# Patient Record
Sex: FEMALE | Race: WHITE | NOT HISPANIC OR LATINO | Employment: UNEMPLOYED | URBAN - METROPOLITAN AREA
[De-identification: names, ages, dates, MRNs, and addresses within clinical notes are randomized per-mention and may not be internally consistent; named-entity substitution may affect disease eponyms.]

---

## 2017-06-13 ENCOUNTER — ALLSCRIPTS OFFICE VISIT (OUTPATIENT)
Dept: OTHER | Facility: OTHER | Age: 57
End: 2017-06-13

## 2017-08-04 ENCOUNTER — ALLSCRIPTS OFFICE VISIT (OUTPATIENT)
Dept: OTHER | Facility: OTHER | Age: 57
End: 2017-08-04

## 2017-08-04 DIAGNOSIS — Z12.31 ENCOUNTER FOR SCREENING MAMMOGRAM FOR MALIGNANT NEOPLASM OF BREAST: ICD-10-CM

## 2017-08-04 DIAGNOSIS — R10.2 PELVIC AND PERINEAL PAIN: ICD-10-CM

## 2017-08-24 LAB
A/G RATIO (HISTORICAL): 1.7 (ref 1.2–2.2)
ALBUMIN SERPL BCP-MCNC: 4.5 G/DL (ref 3.5–5.5)
ALP SERPL-CCNC: 58 IU/L (ref 39–117)
ALT SERPL W P-5'-P-CCNC: 16 IU/L (ref 0–32)
AST SERPL W P-5'-P-CCNC: 20 IU/L (ref 0–40)
BASOPHILS # BLD AUTO: 0 %
BASOPHILS # BLD AUTO: 0 X10E3/UL (ref 0–0.2)
BILIRUB SERPL-MCNC: 0.2 MG/DL (ref 0–1.2)
BUN SERPL-MCNC: 14 MG/DL (ref 6–24)
BUN/CREA RATIO (HISTORICAL): 21 (ref 9–23)
CALCIUM SERPL-MCNC: 9.8 MG/DL (ref 8.7–10.2)
CHLORIDE SERPL-SCNC: 96 MMOL/L (ref 96–106)
CHOLEST SERPL-MCNC: 166 MG/DL (ref 100–199)
CO2 SERPL-SCNC: 24 MMOL/L (ref 18–29)
CREAT SERPL-MCNC: 0.68 MG/DL (ref 0.57–1)
DEPRECATED RDW RBC AUTO: 13.4 % (ref 12.3–15.4)
EGFR AFRICAN AMERICAN (HISTORICAL): 114 ML/MIN/1.73
EGFR-AMERICAN CALC (HISTORICAL): 99 ML/MIN/1.73
EOSINOPHIL # BLD AUTO: 0 %
EOSINOPHIL # BLD AUTO: 0 X10E3/UL (ref 0–0.4)
GLUCOSE SERPL-MCNC: 119 MG/DL (ref 65–99)
HBA1C MFR BLD HPLC: 6.4 % (ref 4.8–5.6)
HCT VFR BLD AUTO: 46 % (ref 34–46.6)
HDLC SERPL-MCNC: 46 MG/DL
HGB BLD-MCNC: 15.2 G/DL (ref 11.1–15.9)
IMM.GRANULOCYTES (CD4/8) (HISTORICAL): 0 %
IMM.GRANULOCYTES (CD4/8) (HISTORICAL): 0 X10E3/UL (ref 0–0.1)
LDLC SERPL CALC-MCNC: 67 MG/DL (ref 0–99)
LYMPHOCYTES # BLD AUTO: 2.8 X10E3/UL (ref 0.7–3.1)
LYMPHOCYTES # BLD AUTO: 42 %
MCH RBC QN AUTO: 30.6 PG (ref 26.6–33)
MCHC RBC AUTO-ENTMCNC: 33 G/DL (ref 31.5–35.7)
MCV RBC AUTO: 93 FL (ref 79–97)
MONOCYTES # BLD AUTO: 0.5 X10E3/UL (ref 0.1–0.9)
MONOCYTES (HISTORICAL): 7 %
NEUTROPHILS # BLD AUTO: 3.4 X10E3/UL (ref 1.4–7)
NEUTROPHILS # BLD AUTO: 51 %
PLATELET # BLD AUTO: 221 X10E3/UL (ref 150–379)
POTASSIUM SERPL-SCNC: 4.7 MMOL/L (ref 3.5–5.2)
RBC (HISTORICAL): 4.97 X10E6/UL (ref 3.77–5.28)
SODIUM SERPL-SCNC: 139 MMOL/L (ref 134–144)
TOT. GLOBULIN, SERUM (HISTORICAL): 2.7 G/DL (ref 1.5–4.5)
TOTAL PROTEIN (HISTORICAL): 7.2 G/DL (ref 6–8.5)
TRIGL SERPL-MCNC: 266 MG/DL (ref 0–149)
WBC # BLD AUTO: 6.7 X10E3/UL (ref 3.4–10.8)

## 2017-08-25 ENCOUNTER — GENERIC CONVERSION - ENCOUNTER (OUTPATIENT)
Dept: OTHER | Facility: OTHER | Age: 57
End: 2017-08-25

## 2018-01-10 NOTE — RESULT NOTES
Verified Results  (1) CBC/PLT/DIFF [id number] 12:17PM [facility]     Test Name Result Flag Reference   WBC 6 7 x10E3/uL  3 4-10 8   RBC 4 90 x10E6/uL  3 77-5 28   Hemoglobin 15 4 g/dL  11 1-15 9   Hematocrit 46 1 %  34 0-46  6   MCV 94 fL  79-97   MCH 31 4 pg  26 6-33 0   MCHC 33 4 g/dL  31 5-35 7   RDW 13 6 %  12 3-15 4   Platelets 730 C06K1/XI  150-379   Neutrophils 51 %     Lymphs 41 %     Monocytes 8 %     Eos 0 %     Basos 0 %     Neutrophils (Absolute) 3 4 x10E3/uL  1 4-7 0   Lymphs (Absolute) 2 8 x10E3/uL  0 7-3 1   Monocytes(Absolute) 0 5 x10E3/uL  0 1-0 9   Eos (Absolute) 0 0 x10E3/uL  0 0-0 4   Baso (Absolute) 0 0 x10E3/uL  0 0-0 2   Immature Granulocytes 0 %     Immature Grans (Abs) 0 0 x10E3/uL  0 0-0 1     (1) VITAMIN D 25-HYDROXY [id number] 12:17PM [facility]     Test Name Result Flag Reference   Vitamin D, 25-Hydroxy 41 2 ng/mL  30 0-100 0   Vitamin D deficiency has been defined by the [place] of  Medicine and an Endocrine Society practice guideline as a  level of serum 25-OH vitamin D less than 20 ng/mL (1,2)  The Endocrine Society went on to further define vitamin D  insufficiency as a level between 21 and 29 ng/mL (2)  1  IOM ([place] of Medicine)  2010  Dietary reference     intakes for calcium and D  [address] [facility]: The     [place]  2  Adebayo MF, Meron NC, Che ONEAL, et al      Evaluation, treatment, and prevention of vitamin D     deficiency: an Endocrine Society clinical practice     guideline  JCEM  2011 Jul; 96(7):1911-30  [facility]) LP+LDL/HDL Ratio [id number] 12:17PM [facility]     Test Name Result Flag Reference   Cholesterol, Total 120 mg/dL  100-199   Triglycerides 188 mg/dL H 0-149   HDL Cholesterol 36 mg/dL L >39   According to ATP-III Guidelines, HDL-C >59 mg/dL is considered a  negative risk factor for CHD     VLDL Cholesterol Steven 38 mg/dL  5-40   LDL Cholesterol Calc 46 mg/dL  0-99   LDL/HDL Ratio 1 3 ratio units  0 0-3 2   LDL/HDL Ratio Men  Women                                               1/2 Avg  Risk  1 0    1 5                                                   Avg Risk  3 6    3 2                                                2X Avg  Risk  6 2    5 0                                                3X Avg  Risk  8 0    6 1

## 2018-01-11 NOTE — RESULT NOTES
Message   Pap smear is normal with negative HPV so next can be repeated in 5 years  Return in 1 year for routine gyn exam       Verified Results  ([facility]) Pap IG, HPV-hr 21Jun2016 12:00AM Cassie Hernandez 62    [facility] Endocervical  No  of containers  Thiago Ariza 01 CYTYC Thin Prep Vial     Test Name Result Flag Reference   DIAGNOSIS: Comment     NEGATIVE FOR INTRAEPITHELIAL LESION AND MALIGNANCY  THE CYTOLOGY PROCESSING WAS PERFORMED AT THE [facility] LOCATED AT  [facility] [address], [address] [id number]  Specimen adequacy: Comment     Satisfactory for evaluation  Endocervical and/or squamous metaplastic  cells (endocervical component) are present  Clinician provided ICD10: Comment     [id number]   Performed by: Markie Arguelles, Cytotechnologist (ASCP)     Thiago Ariza Note: Comment     The Pap smear is a screening test designed to aid in the detection of  premalignant and malignant conditions of the uterine cervix  It is not a  diagnostic procedure and should not be used as the sole means of detecting  cervical cancer  Both false-positive and false-negative reports do occur  Test Methodology: Comment     This liquid based ThinPrep(R) pap test was screened with the  use of an image guided system  HPV, high-risk Negative  Negative   This high-risk HPV test detects thirteen high-risk types  (16/18/31/33/35/39/45/51/52/56/58/59/68) without differentiation  Signatures   Electronically signed by :  JAYSON Hanna; Jun 24 2016  9:00AM EST                       (Author)

## 2018-01-11 NOTE — MISCELLANEOUS
Provider Comments  Provider Comments:   Patient was a no show for today's OV  Signatures   Electronically signed by :  JAYSON Maher; Jun 13 2017 12:20PM EST                       (Author)

## 2018-01-11 NOTE — RESULT NOTES
Verified Results  (1) CBC/PLT/DIFF 24Aug2017 12:01PM [facility]     Test Name Result Flag Reference   WBC 6 7 x10E3/uL  3 4-10 8   RBC 4 97 x10E6/uL  3 77-5 28   Hemoglobin 15 2 g/dL  11 1-15 9   Hematocrit 46 0 %  34 0-46  6   MCV 93 fL  79-97   MCH 30 6 pg  26 6-33 0   MCHC 33 0 g/dL  31 5-35 7   RDW 13 4 %  12 3-15 4   Platelets 818 X30Y6/GI  150-379   Neutrophils 51 %     Lymphs 42 %     Monocytes 7 %     Eos 0 %     Basos 0 %     Neutrophils (Absolute) 3 4 x10E3/uL  1 4-7 0   Lymphs (Absolute) 2 8 x10E3/uL  0 7-3 1   Monocytes(Absolute) 0 5 x10E3/uL  0 1-0 9   Eos (Absolute) 0 0 x10E3/uL  0 0-0 4   Baso (Absolute) 0 0 x10E3/uL  0 0-0 2   Immature Granulocytes 0 %     Immature Grans (Abs) 0 0 x10E3/uL  0 0-0 1     (1) COMPREHENSIVE METABOLIC PANEL [id number] 70:18ZR [facility]     Test Name Result Flag Reference   Glucose, Serum 119 mg/dL H 65-99   BUN 14 mg/dL  6-24   Creatinine, Serum 0 68 mg/dL  0 57-1 00   BUN/Creatinine Ratio 21  9-23   Sodium, Serum 139 mmol/L  134-144   Potassium, Serum 4 7 mmol/L  3 5-5 2   Chloride, Serum 96 mmol/L     Carbon Dioxide, Total 24 mmol/L  18-29   Calcium, Serum 9 8 mg/dL  8 7-10 2   Protein, Total, Serum 7 2 g/dL  6 0-8 5   Albumin, Serum 4 5 g/dL  3 5-5 5   Globulin, Total 2 7 g/dL  1 5-4 5   A/G Ratio 1 7  1 2-2 2   Bilirubin, Total 0 2 mg/dL  0 0-1 2   Alkaline Phosphatase, S 58 IU/L     AST (SGOT) 20 IU/L  0-40   ALT (SGPT) 16 IU/L  0-32   eGFR If NonAfricn Am 99 mL/min/1 73  >59   eGFR If Africn Am 114 mL/min/1 73  >59     (1) HEMOGLOBIN A1C 24Aug2017 12:01PM [facility]     Test Name Result Flag Reference   Hemoglobin A1c 6 4 % H 4 8-5 6   Pre-diabetes: 5 7 - 6 4           Diabetes: >6 4           Glycemic control for adults with diabetes: <7 0     (1) LIPID PANEL FASTING W DIRECT LDL REFLEX 24Aug2017 12:01PM [facility]     Test Name Result Flag Reference   Cholesterol, Total 166 mg/dL  100-199   Triglycerides 266 mg/dL H 0-149   HDL Cholesterol 46 mg/dL  >39   LDL Cholesterol Calc 67 mg/dL  0-99

## 2018-01-14 VITALS
SYSTOLIC BLOOD PRESSURE: 120 MMHG | WEIGHT: 275 LBS | DIASTOLIC BLOOD PRESSURE: 70 MMHG | HEIGHT: 66 IN | TEMPERATURE: 97.7 F | BODY MASS INDEX: 44.2 KG/M2 | HEART RATE: 60 BPM

## 2018-02-06 ENCOUNTER — OFFICE VISIT (OUTPATIENT)
Dept: FAMILY MEDICINE CLINIC | Facility: HOSPITAL | Age: 58
End: 2018-02-06
Payer: COMMERCIAL

## 2018-02-06 VITALS
SYSTOLIC BLOOD PRESSURE: 108 MMHG | HEART RATE: 68 BPM | DIASTOLIC BLOOD PRESSURE: 80 MMHG | HEIGHT: 66 IN | BODY MASS INDEX: 44.97 KG/M2 | TEMPERATURE: 97.4 F | WEIGHT: 279.8 LBS

## 2018-02-06 DIAGNOSIS — M54.50 CHRONIC RIGHT-SIDED LOW BACK PAIN WITHOUT SCIATICA: ICD-10-CM

## 2018-02-06 DIAGNOSIS — Z12.31 ENCOUNTER FOR SCREENING MAMMOGRAM FOR MALIGNANT NEOPLASM OF BREAST: ICD-10-CM

## 2018-02-06 DIAGNOSIS — R73.01 IMPAIRED FASTING GLUCOSE: ICD-10-CM

## 2018-02-06 DIAGNOSIS — G89.29 CHRONIC PAIN OF LEFT HEEL: ICD-10-CM

## 2018-02-06 DIAGNOSIS — G89.29 CHRONIC RIGHT-SIDED LOW BACK PAIN WITHOUT SCIATICA: ICD-10-CM

## 2018-02-06 DIAGNOSIS — Z12.11 SCREEN FOR COLON CANCER: ICD-10-CM

## 2018-02-06 DIAGNOSIS — I10 ESSENTIAL HYPERTENSION: Primary | ICD-10-CM

## 2018-02-06 DIAGNOSIS — M79.672 CHRONIC PAIN OF LEFT HEEL: ICD-10-CM

## 2018-02-06 DIAGNOSIS — F33.42 RECURRENT MAJOR DEPRESSIVE DISORDER, IN FULL REMISSION (HCC): ICD-10-CM

## 2018-02-06 PROCEDURE — 99214 OFFICE O/P EST MOD 30 MIN: CPT | Performed by: NURSE PRACTITIONER

## 2018-02-06 RX ORDER — QUETIAPINE FUMARATE 50 MG/1
TABLET, FILM COATED ORAL
COMMUNITY
Start: 2013-01-21 | End: 2019-11-20 | Stop reason: SDUPTHER

## 2018-02-06 RX ORDER — MELOXICAM 15 MG/1
TABLET ORAL
Qty: 30 TABLET | Refills: 0 | Status: SHIPPED | OUTPATIENT
Start: 2018-02-06 | End: 2019-08-20

## 2018-02-06 RX ORDER — TOLTERODINE 4 MG/1
CAPSULE, EXTENDED RELEASE ORAL
COMMUNITY
Start: 2016-12-13 | End: 2018-02-14 | Stop reason: SDUPTHER

## 2018-02-06 RX ORDER — SPIRONOLACTONE 50 MG/1
1 TABLET, FILM COATED ORAL 2 TIMES DAILY
COMMUNITY
End: 2018-11-13 | Stop reason: SDUPTHER

## 2018-02-06 RX ORDER — MOMETASONE FUROATE 50 UG/1
SPRAY, METERED NASAL
COMMUNITY
Start: 2014-11-11 | End: 2019-08-20 | Stop reason: SDUPTHER

## 2018-02-06 RX ORDER — NAPROXEN 500 MG/1
1 TABLET ORAL EVERY 12 HOURS
COMMUNITY
Start: 2012-06-04 | End: 2019-08-20

## 2018-02-06 RX ORDER — FLUOXETINE HYDROCHLORIDE 40 MG/1
40 CAPSULE ORAL DAILY
Refills: 0 | COMMUNITY
Start: 2018-01-24 | End: 2019-11-20 | Stop reason: SDUPTHER

## 2018-02-06 RX ORDER — LOSARTAN POTASSIUM AND HYDROCHLOROTHIAZIDE 25; 100 MG/1; MG/1
1 TABLET ORAL DAILY
COMMUNITY
Start: 2012-04-25 | End: 2018-05-21 | Stop reason: SDUPTHER

## 2018-02-06 RX ORDER — SIMVASTATIN 20 MG
1 TABLET ORAL DAILY
COMMUNITY
Start: 2012-03-28 | End: 2018-05-20 | Stop reason: SDUPTHER

## 2018-02-06 RX ORDER — LEVOTHYROXINE SODIUM 112 UG/1
112 TABLET ORAL DAILY
Refills: 0 | COMMUNITY
Start: 2018-01-24 | End: 2019-01-08 | Stop reason: SDUPTHER

## 2018-02-06 NOTE — ASSESSMENT & PLAN NOTE
A: IFG on metformin daily per endocrine   P: Will obtain most recent lab results and determine further plan of care pending results

## 2018-02-06 NOTE — PROGRESS NOTES
Assessment/Plan:    Impaired fasting glucose  A: IFG on metformin daily per endocrine   P: Will obtain most recent lab results and determine further plan of care pending results     Hypertension  A: BP normotensive today   P: Continue same losartan daily     Depression  A: SAD managed by psychiatry   P: Maintain f/u with [facility] as scheduled        Diagnoses and all orders for this visit:    Essential hypertension    Impaired fasting glucose    Recurrent major depressive disorder, in full remission ([facility] [address] )    Chronic pain of left heel  -     Ambulatory referral to Podiatry; Future    Chronic right-sided low back pain without sciatica  -     meloxicam (MOBIC) 15 mg tablet; Take 1 tablet daily as needed for back pain    Encounter for screening mammogram for malignant neoplasm of breast  -     Mammo screening bilateral w cad; Future    Screen for colon cancer  -     Cologuard    Other orders  -     spironolactone (ALDACTONE) 50 mg tablet; Take 1 tablet by mouth 2 (two) times a day  -     MULTIPLE VITAMIN PO; Take by mouth  -     FLUoxetine (PROzac) 40 MG capsule; daily  -     metFORMIN (GLUCOPHAGE) 500 mg tablet; Take by mouth  -     levothyroxine 112 mcg tablet; Take 112 mcg by mouth daily  -     losartan-hydrochlorothiazide (HYZAAR) 100-25 MG per tablet; Take 1 tablet by mouth daily  -     naproxen (NAPROSYN) 500 mg tablet; Take 1 tablet by mouth every 12 (twelve) hours  -     mometasone (NASONEX) 50 mcg/act nasal spray; into each nostril  -     QUEtiapine (SEROQUEL) 50 mg tablet; Take by mouth daily  -     simvastatin (ZOCOR) 20 mg tablet; Take 1 tablet by mouth daily  -     tolterodine (DETROL LA) 4 mg 24 hr capsule; Take by mouth daily at bedtime     Pt declines colonoscopy due to transportation difficulties so order given to complete cologuard  She has mammogram order to update when able (due to transportation difficulties)  Flu and pneumonia shots updated this year         Subjective:      Patient ID: Gerhardt Cannon is a 65 y o  female here for routine follow up  Has had a healthy winter so far  States she has been well but struggles with seasonal depression this time of year  Follows with [facility] every other week  Feels she is making progress  She wants to see a podiatrist for left foot issues  Believes she has an ingrown toenail  Had a bad plantar's wart on heel awhile ago and hadn't responded to treatment so needed surgery  She developed a wound at the time which has since healed  Still feels a callous in the area, she tries to sand away at it but it keeps coming back  Had episode of bad right low back pain about a month ago  Was very painful for awhile but has since resolved  Used ice/heat, javier chung and aleve with benefit eventually  Had used naproxen in the past with relief but last relief she got at new pharmacy did not help as much  She wonders what else she could keep on hand to use as needed if pain comes back  Saw Dr Moni Hernandez last in November and was told all was pretty good  Hasn't scheduled mammogram yet due to transportation difficulties, has to rely on public transportation  The following portions of the patient's history were reviewed and updated as appropriate: allergies, current medications, past medical history, past social history, past surgical history and problem list     Review of Systems   Constitutional: Negative for appetite change, fatigue and unexpected weight change  Respiratory: Negative for cough and shortness of breath  Cardiovascular: Negative for chest pain and palpitations  Left foot edema     Genitourinary: Negative for dysuria and frequency  Musculoskeletal: Positive for back pain  Psychiatric/Behavioral: Positive for dysphoric mood  The patient is nervous/anxious  Objective:     Physical Exam   Constitutional: She is oriented to person, place, and time  She appears well-developed and well-nourished  No distress     HENT: Head: Normocephalic and atraumatic  Eyes: Conjunctivae are normal    Neck: No thyromegaly present  Cardiovascular: Normal rate, regular rhythm, normal heart sounds and intact distal pulses  Pulmonary/Chest: Effort normal and breath sounds normal  No respiratory distress  Abdominal:   Obese    Lymphadenopathy:     She has no cervical adenopathy  Neurological: She is alert and oriented to person, place, and time  Skin:   Callused, thickened skin left heel    Psychiatric: She has a normal mood and affect

## 2018-02-08 ENCOUNTER — TELEPHONE (OUTPATIENT)
Dept: FAMILY MEDICINE CLINIC | Facility: HOSPITAL | Age: 58
End: 2018-02-08

## 2018-02-08 NOTE — TELEPHONE ENCOUNTER
Patient called stating that she check with her insurance company about the cologuard and they would not give her a direct answer and she will not deal with this  Also the cologuard would not give her a direct answer either  She is just going to wait for now

## 2018-02-14 DIAGNOSIS — N32.81 OVERACTIVE BLADDER: Primary | ICD-10-CM

## 2018-02-15 RX ORDER — TOLTERODINE 4 MG/1
CAPSULE, EXTENDED RELEASE ORAL
Qty: 30 CAPSULE | Refills: 5 | Status: SHIPPED | OUTPATIENT
Start: 2018-02-15 | End: 2018-07-16 | Stop reason: SDUPTHER

## 2018-03-05 ENCOUNTER — TELEPHONE (OUTPATIENT)
Dept: FAMILY MEDICINE CLINIC | Facility: HOSPITAL | Age: 58
End: 2018-03-05

## 2018-03-16 ENCOUNTER — HOSPITAL ENCOUNTER (OUTPATIENT)
Dept: ULTRASOUND IMAGING | Facility: HOSPITAL | Age: 58
Discharge: HOME | End: 2018-03-16
Payer: COMMERCIAL

## 2018-03-16 ENCOUNTER — HOSPITAL ENCOUNTER (OUTPATIENT)
Dept: BONE DENSITY | Facility: IMAGING CENTER | Age: 58
Discharge: HOME | End: 2018-03-16
Payer: COMMERCIAL

## 2018-03-16 DIAGNOSIS — R10.2 PELVIC AND PERINEAL PAIN: ICD-10-CM

## 2018-03-16 DIAGNOSIS — Z12.31 ENCOUNTER FOR SCREENING MAMMOGRAM FOR MALIGNANT NEOPLASM OF BREAST: ICD-10-CM

## 2018-03-16 PROCEDURE — 77067 SCR MAMMO BI INCL CAD: CPT

## 2018-03-16 PROCEDURE — 76856 US EXAM PELVIC COMPLETE: CPT

## 2018-03-16 PROCEDURE — 76830 TRANSVAGINAL US NON-OB: CPT

## 2018-05-20 DIAGNOSIS — E78.5 HYPERLIPIDEMIA, UNSPECIFIED HYPERLIPIDEMIA TYPE: Primary | ICD-10-CM

## 2018-05-21 DIAGNOSIS — I10 ESSENTIAL HYPERTENSION: Primary | ICD-10-CM

## 2018-05-21 DIAGNOSIS — E78.5 HYPERLIPIDEMIA, UNSPECIFIED HYPERLIPIDEMIA TYPE: ICD-10-CM

## 2018-05-21 RX ORDER — LOSARTAN POTASSIUM AND HYDROCHLOROTHIAZIDE 25; 100 MG/1; MG/1
TABLET ORAL
Qty: 90 TABLET | Refills: 3 | OUTPATIENT
Start: 2018-05-21

## 2018-05-21 RX ORDER — SIMVASTATIN 20 MG
TABLET ORAL DAILY
Qty: 90 TABLET | Refills: 0 | Status: SHIPPED | OUTPATIENT
Start: 2018-05-21 | End: 2018-10-10 | Stop reason: SDUPTHER

## 2018-05-21 RX ORDER — LOSARTAN POTASSIUM AND HYDROCHLOROTHIAZIDE 25; 100 MG/1; MG/1
1 TABLET ORAL DAILY
Qty: 90 TABLET | Refills: 1 | Status: SHIPPED | OUTPATIENT
Start: 2018-05-21 | End: 2018-10-15 | Stop reason: SDUPTHER

## 2018-05-21 NOTE — TELEPHONE ENCOUNTER
OK, please let patient know I am ordering fasting labs I would like her to do before her next appt in August (please offer to mail labs to her b/c she has transportation difficulties)

## 2018-07-16 DIAGNOSIS — N32.81 OVERACTIVE BLADDER: ICD-10-CM

## 2018-07-16 RX ORDER — TOLTERODINE 4 MG/1
CAPSULE, EXTENDED RELEASE ORAL
Qty: 30 CAPSULE | Refills: 5 | Status: SHIPPED | OUTPATIENT
Start: 2018-07-16 | End: 2018-10-11 | Stop reason: SDUPTHER

## 2018-08-01 LAB
ALBUMIN SERPL-MCNC: 4.4 G/DL (ref 3.5–5.5)
ALBUMIN/GLOB SERPL: 1.8 {RATIO} (ref 1.2–2.2)
ALP SERPL-CCNC: 54 IU/L (ref 39–117)
ALT SERPL-CCNC: 20 IU/L (ref 0–32)
AST SERPL-CCNC: 20 IU/L (ref 0–40)
BASOPHILS # BLD AUTO: 0 X10E3/UL (ref 0–0.2)
BASOPHILS NFR BLD AUTO: 0 %
BILIRUB SERPL-MCNC: <0.2 MG/DL (ref 0–1.2)
BUN SERPL-MCNC: 12 MG/DL (ref 6–24)
BUN/CREAT SERPL: 20 (ref 9–23)
CALCIUM SERPL-MCNC: 9.3 MG/DL (ref 8.7–10.2)
CHLORIDE SERPL-SCNC: 97 MMOL/L (ref 96–106)
CHOLEST SERPL-MCNC: 156 MG/DL (ref 100–199)
CHOLEST/HDLC SERPL: 3.8 RATIO (ref 0–4.4)
CO2 SERPL-SCNC: 28 MMOL/L (ref 20–29)
CREAT SERPL-MCNC: 0.59 MG/DL (ref 0.57–1)
EOSINOPHIL # BLD AUTO: 0 X10E3/UL (ref 0–0.4)
EOSINOPHIL NFR BLD AUTO: 0 %
ERYTHROCYTE [DISTWIDTH] IN BLOOD BY AUTOMATED COUNT: 13.6 % (ref 12.3–15.4)
GLOBULIN SER-MCNC: 2.4 G/DL (ref 1.5–4.5)
GLUCOSE SERPL-MCNC: 77 MG/DL (ref 65–99)
HCT VFR BLD AUTO: 42.9 % (ref 34–46.6)
HDLC SERPL-MCNC: 41 MG/DL
HGB BLD-MCNC: 14.5 G/DL (ref 11.1–15.9)
IMM GRANULOCYTES # BLD: 0 X10E3/UL (ref 0–0.1)
IMM GRANULOCYTES NFR BLD: 0 %
LDLC SERPL CALC-MCNC: 69 MG/DL (ref 0–99)
LYMPHOCYTES # BLD AUTO: 2.5 X10E3/UL (ref 0.7–3.1)
LYMPHOCYTES NFR BLD AUTO: 37 %
MCH RBC QN AUTO: 31.4 PG (ref 26.6–33)
MCHC RBC AUTO-ENTMCNC: 33.8 G/DL (ref 31.5–35.7)
MCV RBC AUTO: 93 FL (ref 79–97)
MONOCYTES # BLD AUTO: 0.6 X10E3/UL (ref 0.1–0.9)
MONOCYTES NFR BLD AUTO: 9 %
NEUTROPHILS # BLD AUTO: 3.6 X10E3/UL (ref 1.4–7)
NEUTROPHILS NFR BLD AUTO: 54 %
PLATELET # BLD AUTO: 244 X10E3/UL (ref 150–379)
POTASSIUM SERPL-SCNC: 4.3 MMOL/L (ref 3.5–5.2)
PROT SERPL-MCNC: 6.8 G/DL (ref 6–8.5)
RBC # BLD AUTO: 4.62 X10E6/UL (ref 3.77–5.28)
SL AMB EGFR AFRICAN AMERICAN: 118 ML/MIN/1.73
SL AMB EGFR NON AFRICAN AMERICAN: 103 ML/MIN/1.73
SL AMB VLDL CHOLESTEROL CALC: 46 MG/DL (ref 5–40)
SODIUM SERPL-SCNC: 139 MMOL/L (ref 134–144)
TRIGL SERPL-MCNC: 231 MG/DL (ref 0–149)
WBC # BLD AUTO: 6.9 X10E3/UL (ref 3.4–10.8)

## 2018-08-07 ENCOUNTER — OFFICE VISIT (OUTPATIENT)
Dept: FAMILY MEDICINE CLINIC | Facility: HOSPITAL | Age: 58
End: 2018-08-07
Payer: COMMERCIAL

## 2018-08-07 VITALS
HEART RATE: 76 BPM | SYSTOLIC BLOOD PRESSURE: 108 MMHG | DIASTOLIC BLOOD PRESSURE: 80 MMHG | WEIGHT: 273.6 LBS | TEMPERATURE: 96.6 F | HEIGHT: 64 IN | BODY MASS INDEX: 46.71 KG/M2

## 2018-08-07 DIAGNOSIS — I10 ESSENTIAL HYPERTENSION: ICD-10-CM

## 2018-08-07 DIAGNOSIS — E78.5 HYPERLIPIDEMIA, UNSPECIFIED HYPERLIPIDEMIA TYPE: Primary | ICD-10-CM

## 2018-08-07 DIAGNOSIS — R73.01 IMPAIRED FASTING GLUCOSE: ICD-10-CM

## 2018-08-07 DIAGNOSIS — E03.9 HYPOTHYROIDISM, UNSPECIFIED TYPE: ICD-10-CM

## 2018-08-07 PROCEDURE — 3008F BODY MASS INDEX DOCD: CPT | Performed by: NURSE PRACTITIONER

## 2018-08-07 PROCEDURE — 3079F DIAST BP 80-89 MM HG: CPT | Performed by: NURSE PRACTITIONER

## 2018-08-07 PROCEDURE — 3074F SYST BP LT 130 MM HG: CPT | Performed by: NURSE PRACTITIONER

## 2018-08-07 PROCEDURE — 99214 OFFICE O/P EST MOD 30 MIN: CPT | Performed by: NURSE PRACTITIONER

## 2018-08-07 NOTE — ASSESSMENT & PLAN NOTE
TC and LDL at goal but trigs elevated  Will continue same metformin and discussed lifestyle efforts w/diet and exercise  Orders given to recheck in 6 months before next appt

## 2018-08-07 NOTE — PROGRESS NOTES
Assessment/Plan:    Hypothyroidism  Levothyroxine per endocrine  Due for labs and f/u in November  Impaired fasting glucose  Metformin per endocrine  Labs and f/u due in November  Hypertension  BP normal  Continue same losartan daily and return in 6 months for next BP check  Hyperlipidemia  TC and LDL at goal but trigs elevated  Will continue same metformin and discussed lifestyle efforts w/diet and exercise  Orders given to recheck in 6 months before next appt  Diagnoses and all orders for this visit:    Hyperlipidemia, unspecified hyperlipidemia type  -     Comprehensive metabolic panel; Future  -     Lipid panel; Future  -     Comprehensive metabolic panel  -     Lipid panel    Impaired fasting glucose    Hypothyroidism, unspecified type    Essential hypertension    Other orders  -     Cholecalciferol (VITAMIN D PO); Take by mouth      Cologuard declined by insurance and pt currently refuses colonoscopy  Mammo and pap UTD until 2019  Subjective:      Patient ID: Darrel Pablo is a 65 y o  female here for routine OV  States she has been well since last appt  Had a cologuard kit mailed to her and then found out it wasn't covered by her insurance  Unsure if she wants to do colonoscopy stating it is too invasive and has to drink too much  Due to see endocrine in November and will have blood work at that time  Knows she should be watching diet better and exercising more  Finds it difficult to push herself  Likes salt in her food  Used to enjoy walking but not so much now with living in town  Walks to do her errands because she doesn't have a car  The following portions of the patient's history were reviewed and updated as appropriate: allergies, current medications, past medical history, past social history and problem list     Review of Systems   Constitutional: Negative for fatigue and unexpected weight change  Respiratory: Negative for cough and shortness of breath  Cardiovascular: Negative for chest pain, palpitations and leg swelling  Psychiatric/Behavioral: Negative for dysphoric mood  The patient is not nervous/anxious  Objective:      /80 (Patient Position: Sitting, Cuff Size: Large)   Pulse 76   Temp (!) 96 6 °F (35 9 °C) (Tympanic)   Ht 5' 4" (1 626 m)   Wt 124 kg (273 lb 9 6 oz)   BMI 46 96 kg/m²        Physical Exam   Constitutional: She is oriented to person, place, and time  She appears well-developed and well-nourished  No distress  HENT:   Head: Normocephalic and atraumatic  Eyes: Conjunctivae are normal  No scleral icterus  Neck: No thyromegaly present  Cardiovascular: Normal rate and regular rhythm  No murmur heard  Pulmonary/Chest: Effort normal and breath sounds normal  No respiratory distress  Lymphadenopathy:     She has no cervical adenopathy  Neurological: She is alert and oriented to person, place, and time  Skin: Skin is warm and dry  Psychiatric: She has a normal mood and affect  Her behavior is normal  Judgment and thought content normal    Vitals reviewed

## 2018-10-10 DIAGNOSIS — E78.5 HYPERLIPIDEMIA, UNSPECIFIED HYPERLIPIDEMIA TYPE: ICD-10-CM

## 2018-10-11 DIAGNOSIS — N32.81 OVERACTIVE BLADDER: ICD-10-CM

## 2018-10-11 RX ORDER — SIMVASTATIN 20 MG
TABLET ORAL
Qty: 90 TABLET | Refills: 1 | Status: SHIPPED | OUTPATIENT
Start: 2018-10-11 | End: 2018-12-27 | Stop reason: SDUPTHER

## 2018-10-11 NOTE — TELEPHONE ENCOUNTER
Pt would like to know if she can have her Tolterodine sent to [facility] for 90 days  She has refills on her 30 day script, but she would like to see if she can get 90 days from now on   She will wait to hear from us before she calls the 30 day refill in  pls call

## 2018-10-12 RX ORDER — TOLTERODINE 4 MG/1
4 CAPSULE, EXTENDED RELEASE ORAL EVERY EVENING
Qty: 90 CAPSULE | Refills: 1 | Status: SHIPPED | OUTPATIENT
Start: 2018-10-12 | End: 2019-03-15 | Stop reason: SDUPTHER

## 2018-10-15 DIAGNOSIS — I10 ESSENTIAL HYPERTENSION: ICD-10-CM

## 2018-10-15 RX ORDER — LOSARTAN POTASSIUM AND HYDROCHLOROTHIAZIDE 25; 100 MG/1; MG/1
TABLET ORAL
Qty: 90 TABLET | Refills: 1 | Status: SHIPPED | OUTPATIENT
Start: 2018-10-15 | End: 2019-04-16 | Stop reason: SDUPTHER

## 2018-11-02 LAB
CREAT ?TM UR-SCNC: 47.3 UMOL/L
HBA1C MFR BLD HPLC: 6.6 %

## 2018-11-06 ENCOUNTER — OFFICE VISIT (OUTPATIENT)
Dept: ENDOCRINOLOGY | Facility: HOSPITAL | Age: 58
End: 2018-11-06
Payer: COMMERCIAL

## 2018-11-06 VITALS
WEIGHT: 274.8 LBS | HEIGHT: 64 IN | SYSTOLIC BLOOD PRESSURE: 126 MMHG | BODY MASS INDEX: 46.92 KG/M2 | DIASTOLIC BLOOD PRESSURE: 86 MMHG | HEART RATE: 88 BPM

## 2018-11-06 DIAGNOSIS — E03.9 HYPOTHYROIDISM, UNSPECIFIED TYPE: ICD-10-CM

## 2018-11-06 DIAGNOSIS — L68.0 HIRSUTISM: ICD-10-CM

## 2018-11-06 DIAGNOSIS — E11.9 TYPE 2 DIABETES MELLITUS WITHOUT COMPLICATION, WITHOUT LONG-TERM CURRENT USE OF INSULIN (HCC): Primary | ICD-10-CM

## 2018-11-06 DIAGNOSIS — E28.2 POLYCYSTIC OVARIAN SYNDROME: ICD-10-CM

## 2018-11-06 DIAGNOSIS — E03.9 HYPOTHYROIDISM, ADULT: ICD-10-CM

## 2018-11-06 DIAGNOSIS — E78.5 HYPERLIPIDEMIA, UNSPECIFIED HYPERLIPIDEMIA TYPE: ICD-10-CM

## 2018-11-06 DIAGNOSIS — E11.8 TYPE 2 DIABETES MELLITUS WITH COMPLICATION, UNSPECIFIED WHETHER LONG TERM INSULIN USE: Primary | ICD-10-CM

## 2018-11-06 PROCEDURE — 99204 OFFICE O/P NEW MOD 45 MIN: CPT | Performed by: INTERNAL MEDICINE

## 2018-11-06 NOTE — PATIENT INSTRUCTIONS
hgba1c is 6 6%  This is good control  Thyroid was normal    Urine test shows no diabetes effects on the kidneys  No change in metformin and levothyroxine  Work on weight loss  Follow up in 1 year with blood work

## 2018-11-06 NOTE — PROGRESS NOTES
11/6/2018    Assessment/Plan      Diagnoses and all orders for this visit:    Type 2 diabetes mellitus without complication, without long-term current use of insulin ([facility] [address] )  -     HEMOGLOBIN A1C W/ EAG ESTIMATION Lab Collect; Future  -     Comprehensive metabolic panel Lab Collect; Future  -     Lipid Panel with Direct LDL reflex Lab Collect; Future  -     Microalbumin / creatinine urine ratio Lab Collect; Future  -     TSH, 3rd generation Lab Collect; Future  -     T4, free Lab Collect; Future  -     Thyroid Antibodies Panel Lab Collect; Future  -     HEMOGLOBIN A1C W/ EAG ESTIMATION Lab Collect  -     Comprehensive metabolic panel Lab Collect  -     Lipid Panel with Direct LDL reflex Lab Collect  -     Microalbumin / creatinine urine ratio Lab Collect  -     TSH, 3rd generation Lab Collect  -     T4, free Lab Collect  -     Thyroid Antibodies Panel Lab Collect    Hypothyroidism, unspecified type  -     TSH, 3rd generation Lab Collect; Future  -     T4, free Lab Collect; Future  -     Thyroid Antibodies Panel Lab Collect; Future  -     TSH, 3rd generation Lab Collect  -     T4, free Lab Collect  -     Thyroid Antibodies Panel Lab Collect    Polycystic ovarian syndrome  -     HEMOGLOBIN A1C W/ EAG ESTIMATION Lab Collect; Future  -     Comprehensive metabolic panel Lab Collect; Future  -     HEMOGLOBIN A1C W/ EAG ESTIMATION Lab Collect  -     Comprehensive metabolic panel Lab Collect    Hirsutism  -     Comprehensive metabolic panel Lab Collect; Future  -     Comprehensive metabolic panel Lab Collect    Hyperlipidemia, unspecified hyperlipidemia type  -     Comprehensive metabolic panel Lab Collect; Future  -     Lipid Panel with Direct LDL reflex Lab Collect; Future  -     Comprehensive metabolic panel Lab Collect  -     Lipid Panel with Direct LDL reflex Lab Collect        Assessment/Plan:  1  Type 2 diabetes  Her most recent hemoglobin A1c is reasonable at 6 6%  For now, she will continue the same metformin    I have asked her to keep working on dietary changes and weight loss  She has lost 10 lb in the last 2 years  I have also asked her to work on evaluating and perhaps treating her sleep apnea further  2   Polycystic ovary syndrome  She does have hirsutism on the chin which she shaves regularly  She is on spironolactone 50 mg twice a day which may help that  3   Hypothyroidism  Most recent thyroid function tests are normal   She is biochemically euthyroid will continue the current dose of levothyroxine 112 mcg daily  4   Hyperlipidemia  I will repeat her lipid profile in 1 year  She is on simvastatin 20 mg daily  I have asked her to follow up in 1 year with preceding hemoglobin A1c, CMP, TSH, free T4, lipid profile, and urine microalbumin to creatinine ratio  CC: Diabetes and thyroid Consult    History of Present Illness     HPI: Jenny Chiu is a 65y o  year old female with type 2 diabetes for 2 years  She is on oral agents at home and takes Metformin 500 mg 2 in am and 1 in pm  She denies any polyphagia, polydipsia, or numbness or tingling of the feet  She has some polyuria and once or twice a night nocturia  She has chronic for blurry vision in the left eye and is legally blind  She denies chest pain or shortness of breath  She denies neuropathy, nephropathy, retinopathy, heart attack, stroke and claudication but does admit to none  She was diagnosed with hypothyroidism in the 1980s  She is taking levothyroxine 112 mcg daily  She denies heat or cold intolerance but tends to be on the warmer side will get an intermittent hot flash  She denies palpitation, tremors, anxiety or depression, weight changes, diarrhea or constipation  She does have sleeping problems if she does not take her Seroquel regularly  She is fatigued  She has no dry skin  She has chronic brittle nails  Her hair loss is less since using Rogaine  She denies diplopia    She has no compressive thyroid symptoms or difficulty with swallowing  She has no history of head or neck irradiation in the past   She does have a history of polycystic ovary syndrome and has stable hirsutism on her chin  She is currently on spironolactone 50 mg twice a day and metformin 1500 mg daily  Of note, she has lost 10 lb in 2 years  Hypoglycemic episodes: No never  H/o of hypoglycemia causing hospitalization or intervention such as glucagon injection  or ambulance call  No   Hypoglycemia symptoms: never had one  Treatment of hypoglycemia: would eat food  Glucagon:No   Medic alert tag: recommended,Yes  The patient's last eye exam was in over 1-2 years with no retinopathy  The patient's last foot exam was in not seen unless problems  Last A1C was 6 6% on 11/02/2018  Blood Sugar/Glucometer/Pump/CGM review: does not check blood sugars  Before breakfast:   Before lunch:   Before dinner:   Bedtime:     Review of Systems   Constitutional: Positive for fatigue  Negative for unexpected weight change  HENT: Negative for hearing loss, tinnitus and trouble swallowing  Has had a lot of dental work done  Eyes: Positive for visual disturbance  Wears glasses  No diplopia  Has blurry vision and needs bifocals  Legally blind in left eye from previous eye surgery  Respiratory: Negative for chest tightness and shortness of breath  Cardiovascular: Positive for leg swelling  Negative for chest pain and palpitations  Left foot always a bit swollen and ankle swells at times  Gastrointestinal: Negative for abdominal pain, constipation, diarrhea and nausea  Endocrine: Positive for polyuria  Negative for cold intolerance, heat intolerance, polydipsia and polyphagia  Nocturia 1-2 times a night  Can tend to be on the hot side  Has gotten hot flash at night infrequently  Musculoskeletal: Positive for back pain  Negative for arthralgias          Occasional low back pain, history of sciatic pain in the past    Skin: Negative for rash and wound  Has hirsutism on chin without change  No dry skin, but has brittle nails chronically  Less hair loss  Using rogaine foam    Neurological: Positive for dizziness  Negative for tremors, light-headedness, numbness and headaches  Occasional dizziness  Psychiatric/Behavioral: Positive for sleep disturbance  Negative for dysphoric mood  The patient is not nervous/anxious  Sleep problems if she doses not take seroquel  Anxiety and depression stable  Not using her CPAP         Historical Information   Past Medical History:   Diagnosis Date    Anxiety     Arthritis     last assessed: June 4, 2012    Depression     Diabetes [facility])     mellitus - Resolved: Nov 18, 2015    Hashimoto's thyroiditis      Past Surgical History:   Procedure Laterality Date    CATARACT EXTRACTION Left     FOOT SURGERY Left     plantars wart removed    TONSILLECTOMY       Social History   History   Alcohol Use No     History   Drug Use No     History   Smoking Status    Never Smoker   Smokeless Tobacco    Never Used     Family History:   Family History   Problem Relation Age of Onset    Diabetes Mother         mellitus     Diabetes type II Mother     Mental illness Mother     Esophageal cancer Father     No Known Problems Brother     No Known Problems Brother     No Known Problems Brother        Meds/Allergies   Current Outpatient Prescriptions   Medication Sig Dispense Refill    cholecalciferol (VITAMIN D3) 1,000 units tablet Take by mouth daily        FLUoxetine (PROzac) 40 MG capsule Take 40 mg by mouth daily    0    levothyroxine 112 mcg tablet Take 112 mcg by mouth daily  0    losartan-hydrochlorothiazide (HYZAAR) 100-25 MG per tablet take 1 tablet by mouth once daily 90 tablet 1    meloxicam (MOBIC) 15 mg tablet Take 1 tablet daily as needed for back pain 30 tablet 0    metFORMIN (GLUCOPHAGE) 500 mg tablet Take by mouth 2 in am and 1 in pm       mometasone (NASONEX) 50 mcg/act nasal spray into each nostril      MULTIPLE VITAMIN PO Take by mouth daily        naproxen (NAPROSYN) 500 mg tablet Take 1 tablet by mouth every 12 (twelve) hours      QUEtiapine (SEROQUEL) 50 mg tablet Take by mouth daily at bedtime        simvastatin (ZOCOR) 20 mg tablet take 1 tablet by mouth once daily 90 tablet 1    spironolactone (ALDACTONE) 50 mg tablet Take 1 tablet by mouth 2 (two) times a day      tolterodine (DETROL LA) 4 mg 24 hr capsule Take 1 capsule (4 mg total) by mouth every evening 90 capsule 1     No current facility-administered medications for this visit  Allergies   Allergen Reactions    Lisinopril Cough     Reaction Date: [id number];        Objective   Vitals: Blood pressure 126/86, pulse 88, height 5' 4" (1 626 m), weight 125 kg (274 lb 12 8 oz)  Invasive Devices          No matching active lines, drains, or airways          Physical Exam   Constitutional: She is oriented to person, place, and time  She appears well-developed and well-nourished  HENT:   Head: Normocephalic and atraumatic  Eyes: Pupils are equal, round, and reactive to light  Conjunctivae and EOM are normal    No lid lag, stare, proptosis, or periorbital edema  Neck: Normal range of motion  Neck supple  No thyromegaly present  Thyroid without palpable thyroid nodules  No carotid bruits  Cardiovascular: Normal rate, regular rhythm, normal heart sounds and intact distal pulses  Pulses are no weak pulses  No murmur heard  Pulses:       Dorsalis pedis pulses are 2+ on the right side, and 2+ on the left side  Posterior tibial pulses are 2+ on the right side, and 2+ on the left side  Pulmonary/Chest: Effort normal and breath sounds normal  She has no wheezes  Abdominal: Soft  Bowel sounds are normal  There is no tenderness  Musculoskeletal: Normal range of motion  She exhibits edema  She exhibits no deformity  Very thick hard callus medial left heel  Trace edema left foot    No ulceration of the feet when examined with the shoes and socks removed  No tremor of the outstretched hands  No spinous process tenderness  No CVA tenderness  Feet:   Right Foot:   Skin Integrity: Negative for ulcer, skin breakdown, erythema, warmth, callus or dry skin  Left Foot:   Skin Integrity: Positive for callus  Negative for ulcer, skin breakdown, erythema, warmth or dry skin  Lymphadenopathy:     She has no cervical adenopathy  Neurological: She is alert and oriented to person, place, and time  She has normal reflexes  Vibration sensation slightly diminished to the 1st toe DIP joint bilaterally  Microfilament sensation intact bilaterally except to the heels  Achilles tendon reflexes intact bilaterally  Skin: Skin is warm and dry  No rash noted  Vitals reviewed  Patient's shoes and socks removed  Right Foot/Ankle   Right Foot Inspection  Skin Exam: skin normal and skin intact no dry skin, no warmth, no callus, no erythema, no maceration, no abnormal color, no pre-ulcer, no ulcer and no callus                          Toe Exam: ROM and strength within normal limits  Sensory   Vibration: diminished    Monofilament testing: intact  Vascular  Capillary refills: < 3 seconds  The right DP pulse is 2+  The right PT pulse is 2+  Left Foot/Ankle  Left Foot Inspection  Skin Exam: skin normal, skin intact and callusno dry skin, no warmth, no erythema, no maceration, normal color, no pre-ulcer and no ulcer                         Toe Exam: ROM and strength within normal limits and swelling                   Sensory   Vibration: diminished    Monofilament: intact  Vascular  Capillary refills: < 3 seconds  The left DP pulse is 2+  The left PT pulse is 2+  Assign Risk Category:  No deformity present; No loss of protective sensation; No weak pulses       Risk: 0      The history was obtained from the review of the chart and from the patient      Lab Results:   Blood work done at [facility] on 11/02/2018 showed hemoglobin A1c of 6 6%  Urine microalbumin to creatinine ratio was less than 6 3   TSH is 1 02 with a free T4    CMP showed a glucose of 132 random but was otherwise normal      Future Appointments  Date Time Provider Mitzi Ackerman   2/12/2019 1:00 PM JAYSON Vicente DR [facility]   11/12/2019 1:00 PM Amirah Flowers MD [facility]

## 2018-11-13 DIAGNOSIS — E28.2 PCOS (POLYCYSTIC OVARIAN SYNDROME): ICD-10-CM

## 2018-11-13 DIAGNOSIS — L68.0 HIRSUTISM: Primary | ICD-10-CM

## 2018-11-13 RX ORDER — SPIRONOLACTONE 50 MG/1
TABLET, FILM COATED ORAL
Qty: 180 TABLET | Refills: 4 | Status: SHIPPED | OUTPATIENT
Start: 2018-11-13 | End: 2019-11-28 | Stop reason: SDUPTHER

## 2018-12-11 DIAGNOSIS — E11.9 TYPE 2 DIABETES MELLITUS WITHOUT COMPLICATION, WITHOUT LONG-TERM CURRENT USE OF INSULIN (HCC): Primary | ICD-10-CM

## 2018-12-27 DIAGNOSIS — E78.5 HYPERLIPIDEMIA, UNSPECIFIED HYPERLIPIDEMIA TYPE: ICD-10-CM

## 2018-12-28 RX ORDER — SIMVASTATIN 20 MG
TABLET ORAL
Qty: 90 TABLET | Refills: 1 | Status: SHIPPED | OUTPATIENT
Start: 2018-12-28 | End: 2019-06-28 | Stop reason: SDUPTHER

## 2019-01-08 DIAGNOSIS — E03.9 ACQUIRED HYPOTHYROIDISM: Primary | ICD-10-CM

## 2019-01-08 RX ORDER — LEVOTHYROXINE SODIUM 112 UG/1
TABLET ORAL
Qty: 90 TABLET | Refills: 6 | Status: SHIPPED | OUTPATIENT
Start: 2019-01-08 | End: 2020-03-03

## 2019-02-07 LAB
ALBUMIN SERPL-MCNC: 4.6 G/DL (ref 3.5–5.5)
ALBUMIN/GLOB SERPL: 2 {RATIO} (ref 1.2–2.2)
ALP SERPL-CCNC: 58 IU/L (ref 39–117)
ALT SERPL-CCNC: 20 IU/L (ref 0–32)
AST SERPL-CCNC: 22 IU/L (ref 0–40)
BILIRUB SERPL-MCNC: 0.2 MG/DL (ref 0–1.2)
BUN SERPL-MCNC: 14 MG/DL (ref 6–24)
BUN/CREAT SERPL: 19 (ref 9–23)
CALCIUM SERPL-MCNC: 10.2 MG/DL (ref 8.7–10.2)
CHLORIDE SERPL-SCNC: 97 MMOL/L (ref 96–106)
CHOLEST SERPL-MCNC: 158 MG/DL (ref 100–199)
CHOLEST/HDLC SERPL: 3.5 RATIO (ref 0–4.4)
CO2 SERPL-SCNC: 26 MMOL/L (ref 20–29)
CREAT SERPL-MCNC: 0.73 MG/DL (ref 0.57–1)
GLOBULIN SER-MCNC: 2.3 G/DL (ref 1.5–4.5)
GLUCOSE SERPL-MCNC: 120 MG/DL (ref 65–99)
HDLC SERPL-MCNC: 45 MG/DL
LDLC SERPL CALC-MCNC: 77 MG/DL (ref 0–99)
POTASSIUM SERPL-SCNC: 4.8 MMOL/L (ref 3.5–5.2)
PROT SERPL-MCNC: 6.9 G/DL (ref 6–8.5)
SL AMB EGFR AFRICAN AMERICAN: 106 ML/MIN/1.73
SL AMB EGFR NON AFRICAN AMERICAN: 92 ML/MIN/1.73
SL AMB VLDL CHOLESTEROL CALC: 36 MG/DL (ref 5–40)
SODIUM SERPL-SCNC: 140 MMOL/L (ref 134–144)
TRIGL SERPL-MCNC: 179 MG/DL (ref 0–149)

## 2019-02-19 ENCOUNTER — OFFICE VISIT (OUTPATIENT)
Dept: FAMILY MEDICINE CLINIC | Facility: HOSPITAL | Age: 59
End: 2019-02-19
Payer: COMMERCIAL

## 2019-02-19 VITALS
HEART RATE: 88 BPM | BODY MASS INDEX: 46.08 KG/M2 | OXYGEN SATURATION: 97 % | SYSTOLIC BLOOD PRESSURE: 126 MMHG | DIASTOLIC BLOOD PRESSURE: 80 MMHG | HEIGHT: 65 IN | WEIGHT: 276.6 LBS | TEMPERATURE: 97.8 F

## 2019-02-19 DIAGNOSIS — I10 ESSENTIAL HYPERTENSION: Primary | ICD-10-CM

## 2019-02-19 DIAGNOSIS — F33.42 RECURRENT MAJOR DEPRESSIVE DISORDER, IN FULL REMISSION (HCC): ICD-10-CM

## 2019-02-19 DIAGNOSIS — R21 RASH: ICD-10-CM

## 2019-02-19 DIAGNOSIS — Z12.11 SCREENING FOR MALIGNANT NEOPLASM OF COLON: ICD-10-CM

## 2019-02-19 DIAGNOSIS — E11.9 TYPE 2 DIABETES MELLITUS WITHOUT COMPLICATION, WITHOUT LONG-TERM CURRENT USE OF INSULIN (HCC): ICD-10-CM

## 2019-02-19 PROCEDURE — 1036F TOBACCO NON-USER: CPT | Performed by: NURSE PRACTITIONER

## 2019-02-19 PROCEDURE — 3008F BODY MASS INDEX DOCD: CPT | Performed by: NURSE PRACTITIONER

## 2019-02-19 PROCEDURE — 3074F SYST BP LT 130 MM HG: CPT | Performed by: NURSE PRACTITIONER

## 2019-02-19 PROCEDURE — 99214 OFFICE O/P EST MOD 30 MIN: CPT | Performed by: NURSE PRACTITIONER

## 2019-02-19 PROCEDURE — 3079F DIAST BP 80-89 MM HG: CPT | Performed by: NURSE PRACTITIONER

## 2019-02-19 RX ORDER — BETAMETHASONE DIPROPIONATE 0.5 MG/G
CREAM TOPICAL 2 TIMES DAILY
Qty: 45 G | Refills: 0 | Status: SHIPPED | OUTPATIENT
Start: 2019-02-19 | End: 2019-12-15 | Stop reason: SDUPTHER

## 2019-02-19 NOTE — PROGRESS NOTES
Assessment/Plan:    Hypertension  BP stable on daily losartan  Continue same and return in 6 months for next BP check  Type 2 diabetes mellitus without complication, without long-term current use of insulin ([facility] [address] )  Lab Results   Component Value Date    HGBA1C 6 6 11/02/2018     Management per endocrine  A1C and microalbumin ordered  She plans to schedule eye exam  Foot exam UTD until November  Depression  Continue f/u w/therapist and psychiatry  Diagnoses and all orders for this visit:    Essential hypertension    Screening for malignant neoplasm of colon  -     Occult Blood, Fecal Immunochemical; Future    Rash  -     betamethasone dipropionate (DIPROSONE) 0 05 % cream; Apply topically 2 (two) times a day    Type 2 diabetes mellitus without complication, without long-term current use of insulin ([facility])    Recurrent major depressive disorder, in full remission ([facility] [address] )      Colonoscopy declined and cologuard not covered by insurance  Fit kit given - pt agreeable to completing  She declines mammogram this year  Subjective:      Patient ID: Roseanne Ricks is a 63 y o  female here for routine follow up  States she is physically doing well but is having some personal issues and trying to handle stress well  Having difficulty living where older people live and finds it depressing  Sees a therapist twice/month and feels she has made progress  Had flu shot and has been taking zinc this winter  Has had a healthy winter with using  and washing hands often  She has flare ups of skin condition that looks like "chicken skin"  Will be very itchy and dry and feels inflamed  Flared last 2 months ago and slowly went away on it's own  Usually notes on stomach and abdomen  Has had for awhile and has used cream years ago from Dr Cardozo Gut  No rash currently         The following portions of the patient's history were reviewed and updated as appropriate: allergies, current medications, past medical history, past social history and problem list     Review of Systems   Constitutional: Negative for activity change, appetite change and fatigue  Respiratory: Negative for cough and shortness of breath  Cardiovascular: Negative for chest pain, palpitations and leg swelling  Skin: Negative for rash  Psychiatric/Behavioral: Positive for dysphoric mood  Objective:      /80 (Patient Position: Sitting, Cuff Size: Large)   Pulse 88   Temp 97 8 °F (36 6 °C) (Tympanic)   Ht 5' 4 5" (1 638 m)   Wt 125 kg (276 lb 9 6 oz)   SpO2 97%   BMI 46 75 kg/m²          Physical Exam   Constitutional: She is oriented to person, place, and time  She appears well-developed and well-nourished  No distress  HENT:   Head: Normocephalic and atraumatic  Eyes: Conjunctivae are normal  Right eye exhibits no discharge  Left eye exhibits no discharge  Neck: Thyromegaly present  Thyroid smoothly enlarged   Cardiovascular: Normal rate and regular rhythm  Pulmonary/Chest: Effort normal and breath sounds normal  No respiratory distress  Lymphadenopathy:     She has no cervical adenopathy  Neurological: She is alert and oriented to person, place, and time  Skin: Skin is warm and dry  Psychiatric: She has a normal mood and affect  Her behavior is normal  Judgment and thought content normal    Vitals reviewed

## 2019-02-19 NOTE — ASSESSMENT & PLAN NOTE
Lab Results   Component Value Date    HGBA1C 6 6 11/02/2018     Management per endocrine  A1C and microalbumin ordered  She plans to schedule eye exam  Foot exam UTD until November

## 2019-03-15 DIAGNOSIS — N32.81 OVERACTIVE BLADDER: ICD-10-CM

## 2019-03-15 RX ORDER — TOLTERODINE 4 MG/1
CAPSULE, EXTENDED RELEASE ORAL
Qty: 90 CAPSULE | Refills: 1 | Status: SHIPPED | OUTPATIENT
Start: 2019-03-15 | End: 2019-04-11 | Stop reason: SDUPTHER

## 2019-03-27 ENCOUNTER — TELEPHONE (OUTPATIENT)
Dept: FAMILY MEDICINE CLINIC | Facility: HOSPITAL | Age: 59
End: 2019-03-27

## 2019-04-02 DIAGNOSIS — E78.5 HYPERLIPIDEMIA, UNSPECIFIED HYPERLIPIDEMIA TYPE: Primary | ICD-10-CM

## 2019-04-11 DIAGNOSIS — N32.81 OVERACTIVE BLADDER: ICD-10-CM

## 2019-04-12 RX ORDER — TOLTERODINE 4 MG/1
4 CAPSULE, EXTENDED RELEASE ORAL EVERY EVENING
Qty: 90 CAPSULE | Refills: 1 | Status: SHIPPED | OUTPATIENT
Start: 2019-04-12 | End: 2020-01-10

## 2019-04-14 DIAGNOSIS — I10 ESSENTIAL HYPERTENSION: ICD-10-CM

## 2019-04-16 RX ORDER — LOSARTAN POTASSIUM AND HYDROCHLOROTHIAZIDE 25; 100 MG/1; MG/1
TABLET ORAL
Qty: 90 TABLET | Refills: 1 | Status: SHIPPED | OUTPATIENT
Start: 2019-04-16 | End: 2019-09-20 | Stop reason: SDUPTHER

## 2019-04-24 LAB
LEFT EYE DIABETIC RETINOPATHY: NORMAL
RIGHT EYE DIABETIC RETINOPATHY: NORMAL

## 2019-06-26 DIAGNOSIS — E78.5 HYPERLIPIDEMIA, UNSPECIFIED HYPERLIPIDEMIA TYPE: ICD-10-CM

## 2019-06-28 RX ORDER — SIMVASTATIN 20 MG
TABLET ORAL
Qty: 90 TABLET | Refills: 1 | Status: SHIPPED | OUTPATIENT
Start: 2019-06-28 | End: 2020-03-03

## 2019-08-09 LAB
ALBUMIN SERPL-MCNC: 4.5 G/DL (ref 3.5–5.5)
ALBUMIN/GLOB SERPL: 1.7 {RATIO} (ref 1.2–2.2)
ALP SERPL-CCNC: 55 IU/L (ref 39–117)
ALT SERPL-CCNC: 17 IU/L (ref 0–32)
AST SERPL-CCNC: 18 IU/L (ref 0–40)
BILIRUB SERPL-MCNC: 0.3 MG/DL (ref 0–1.2)
BUN SERPL-MCNC: 13 MG/DL (ref 6–24)
BUN/CREAT SERPL: 19 (ref 9–23)
CALCIUM SERPL-MCNC: 9.9 MG/DL (ref 8.7–10.2)
CHLORIDE SERPL-SCNC: 97 MMOL/L (ref 96–106)
CHOLEST SERPL-MCNC: 142 MG/DL (ref 100–199)
CHOLEST/HDLC SERPL: 3.4 RATIO (ref 0–4.4)
CO2 SERPL-SCNC: 25 MMOL/L (ref 20–29)
CREAT SERPL-MCNC: 0.68 MG/DL (ref 0.57–1)
GLOBULIN SER-MCNC: 2.6 G/DL (ref 1.5–4.5)
GLUCOSE SERPL-MCNC: 111 MG/DL (ref 65–99)
HDLC SERPL-MCNC: 42 MG/DL
LDLC SERPL CALC-MCNC: 55 MG/DL (ref 0–99)
POTASSIUM SERPL-SCNC: 5 MMOL/L (ref 3.5–5.2)
PROT SERPL-MCNC: 7.1 G/DL (ref 6–8.5)
SL AMB EGFR AFRICAN AMERICAN: 112 ML/MIN/1.73
SL AMB EGFR NON AFRICAN AMERICAN: 97 ML/MIN/1.73
SL AMB VLDL CHOLESTEROL CALC: 45 MG/DL (ref 5–40)
SODIUM SERPL-SCNC: 137 MMOL/L (ref 134–144)
TRIGL SERPL-MCNC: 225 MG/DL (ref 0–149)

## 2019-08-20 ENCOUNTER — OFFICE VISIT (OUTPATIENT)
Dept: FAMILY MEDICINE CLINIC | Facility: HOSPITAL | Age: 59
End: 2019-08-20
Payer: COMMERCIAL

## 2019-08-20 VITALS
OXYGEN SATURATION: 95 % | SYSTOLIC BLOOD PRESSURE: 108 MMHG | TEMPERATURE: 97.1 F | WEIGHT: 273.8 LBS | BODY MASS INDEX: 46.74 KG/M2 | HEIGHT: 64 IN | HEART RATE: 90 BPM | DIASTOLIC BLOOD PRESSURE: 60 MMHG

## 2019-08-20 DIAGNOSIS — N32.81 OVERACTIVE BLADDER: ICD-10-CM

## 2019-08-20 DIAGNOSIS — E66.01 MORBID OBESITY WITH BMI OF 45.0-49.9, ADULT (HCC): ICD-10-CM

## 2019-08-20 DIAGNOSIS — Z23 ENCOUNTER FOR IMMUNIZATION: ICD-10-CM

## 2019-08-20 DIAGNOSIS — I10 ESSENTIAL HYPERTENSION: ICD-10-CM

## 2019-08-20 DIAGNOSIS — R09.81 SINUS CONGESTION: ICD-10-CM

## 2019-08-20 DIAGNOSIS — E78.5 HYPERLIPIDEMIA, UNSPECIFIED HYPERLIPIDEMIA TYPE: ICD-10-CM

## 2019-08-20 DIAGNOSIS — R09.81 SINUS CONGESTION: Primary | ICD-10-CM

## 2019-08-20 DIAGNOSIS — E11.8 TYPE 2 DIABETES MELLITUS WITH COMPLICATION, UNSPECIFIED WHETHER LONG TERM INSULIN USE: Primary | ICD-10-CM

## 2019-08-20 DIAGNOSIS — F33.42 RECURRENT MAJOR DEPRESSIVE DISORDER, IN FULL REMISSION (HCC): ICD-10-CM

## 2019-08-20 LAB — SL AMB POCT HEMOGLOBIN AIC: 6.1 (ref ?–6.5)

## 2019-08-20 PROCEDURE — 3008F BODY MASS INDEX DOCD: CPT | Performed by: NURSE PRACTITIONER

## 2019-08-20 PROCEDURE — 3044F HG A1C LEVEL LT 7.0%: CPT | Performed by: NURSE PRACTITIONER

## 2019-08-20 PROCEDURE — 99214 OFFICE O/P EST MOD 30 MIN: CPT | Performed by: NURSE PRACTITIONER

## 2019-08-20 PROCEDURE — 83036 HEMOGLOBIN GLYCOSYLATED A1C: CPT | Performed by: NURSE PRACTITIONER

## 2019-08-20 PROCEDURE — 1036F TOBACCO NON-USER: CPT | Performed by: NURSE PRACTITIONER

## 2019-08-20 PROCEDURE — 3074F SYST BP LT 130 MM HG: CPT | Performed by: NURSE PRACTITIONER

## 2019-08-20 RX ORDER — FLUTICASONE PROPIONATE 50 MCG
2 SPRAY, SUSPENSION (ML) NASAL DAILY
Qty: 1 BOTTLE | Refills: 3 | Status: SHIPPED | OUTPATIENT
Start: 2019-08-20 | End: 2020-02-18

## 2019-08-20 RX ORDER — MOMETASONE FUROATE 50 UG/1
SPRAY, METERED NASAL
Qty: 1 ACT | Refills: 2 | Status: SHIPPED | OUTPATIENT
Start: 2019-08-20

## 2019-08-20 NOTE — PATIENT INSTRUCTIONS
Obesity   AMBULATORY CARE:   Obesity  is when your body mass index (BMI) is greater than 30  Your healthcare provider will use your height and weight to measure your BMI  The risks of obesity include  many health problems, such as injuries or physical disability  You may need tests to check for the following:  · Diabetes     · High blood pressure or high cholesterol     · Heart disease     · Gallbladder or liver disease     · Cancer of the colon, breast, prostate, liver, or kidney     · Sleep apnea     · Arthritis or gout  Seek care immediately if:   · You have a severe headache, confusion, or difficulty speaking  · You have weakness on one side of your body  · You have chest pain, sweating, or shortness of breath  Contact your healthcare provider if:   · You have symptoms of gallbladder or liver disease, such as pain in your upper abdomen  · You have knee or hip pain and discomfort while walking  · You have symptoms of diabetes, such as intense hunger and thirst, and frequent urination  · You have symptoms of sleep apnea, such as snoring or daytime sleepiness  · You have questions or concerns about your condition or care  Treatment for obesity  focuses on helping you lose weight to improve your health  Even a small decrease in BMI can reduce the risk for many health problems  Your healthcare provider will help you set a weight-loss goal   · Lifestyle changes  are the first step in treating obesity  These include making healthy food choices and getting regular physical activity  Your healthcare provider may suggest a weight-loss program that involves coaching, education, and therapy  · Medicine  may help you lose weight when it is used with a healthy diet and physical activity  · Surgery  can help you lose weight if you are very obese and have other health problems  There are several types of weight-loss surgery  Ask your healthcare provider for more information    Be successful losing weight:   · Set small, realistic goals  An example of a small goal is to walk for 20 minutes 5 days a week  Anther goal is to lose 5% of your body weight  · Tell friends, family members, and coworkers about your goals  and ask for their support  Ask a friend to lose weight with you, or join a weight-loss support group  · Identify foods or triggers that may cause you to overeat , and find ways to avoid them  Remove tempting high-calorie foods from your home and workplace  Place a bowl of fresh fruit on your kitchen counter  If stress causes you to eat, then find other ways to cope with stress  · Keep a diary to track what you eat and drink  Also write down how many minutes of physical activity you do each day  Weigh yourself once a week and record it in your diary  Eating changes: You will need to eat 500 to 1,000 fewer calories each day than you currently eat to lose 1 to 2 pounds a week  The following changes will help you cut calories:  · Eat smaller portions  Use small plates, no larger than 9 inches in diameter  Fill your plate half full of fruits and vegetables  Measure your food using measuring cups until you know what a serving size looks like  · Eat 3 meals and 1 or 2 snacks each day  Plan your meals in advance  Selvin Graham and eat at home most of the time  Eat slowly  · Eat fruits and vegetables at every meal   They are low in calories and high in fiber, which makes you feel full  Do not add butter, margarine, or cream sauce to vegetables  Use herbs to season steamed vegetables  · Eat less fat and fewer fried foods  Eat more baked or grilled chicken and fish  These protein sources are lower in calories and fat than red meat  Limit fast food  Dress your salads with olive oil and vinegar instead of bottled dressing  · Limit the amount of sugar you eat  Do not drink sugary beverages  Limit alcohol  Activity changes:  Physical activity is good for your body in many ways   It helps you burn calories and build strong muscles  It decreases stress and depression, and improves your mood  It can also help you sleep better  Talk to your healthcare provider before you begin an exercise program   · Exercise for at least 30 minutes 5 days a week  Start slowly  Set aside time each day for physical activity that you enjoy and that is convenient for you  It is best to do both weight training and an activity that increases your heart rate, such as walking, bicycling, or swimming  · Find ways to be more active  Do yard work and housecleaning  Walk up the stairs instead of using elevators  Spend your leisure time going to events that require walking, such as outdoor festivals or fairs  This extra physical activity can help you lose weight and keep it off  Follow up with your healthcare provider as directed: You may need to meet with a dietitian  Write down your questions so you remember to ask them during your visits  © 2017 [employer] Information is for End User's use only and may not be sold, redistributed or otherwise used for commercial purposes  All illustrations and images included in CareNotes® are the copyrighted property of [employer]  or Chris Dowling  The above information is an  only  It is not intended as medical advice for individual conditions or treatments  Talk to your doctor, nurse or pharmacist before following any medical regimen to see if it is safe and effective for you  Overactive Bladder   WHAT YOU NEED TO KNOW:   What is overactive bladder? Overactive bladder is a sudden urge to urinate that is difficult for you to control  It occurs when the muscles of the bladder contract (tighten) more than normal  This causes a frequent or sudden need to urinate  You usually have to urinate more than 8 times in 24 hours  You may need to get up more than once in the middle of the night to urinate   You may also leak urine before you are able to make it to the bathroom  What increases my risk for overactive bladder? Your risk for overactive bladder increases as you get older  Previous vaginal birth, chronic constipation, and diabetes increase your risk  Obesity, nerve injury, stroke, and spinal cord problems also increase your risk  How is overactive bladder diagnosed? Your healthcare provider will ask questions about your symptoms  He will also ask you about any medical conditions you have and the medicines you take  He may examine your pelvic area and abdomen to look for problems that may be causing your symptoms  You may need any of the following:  · Blood and urine tests  may be done to look for signs of infection or blood or glucose (sugar) in your urine  · You may be asked to keep a record  about your urination patterns for a few days  Write down the number of times you urinate over 24 hours, the amount, and if you have urine leakage  Your healthcare provider may also want you to record the type and amount of liquids you drink  · An ultrasound  of your bladder may be done  The amount of urine left in your bladder after you urinate will be measured  · A cystoscopy  may show problems inside your bladder  A cystoscope is put into your bladder through your urethra  The urethra is the tube that urine flows through when you urinate  The cystoscope is a long tube with a lens and a light on the end  · Urodynamic testing  may show how well your bladder works  A narrow tube is placed in your urethra and another is placed in your rectum  Ask for more information about this test   How is overactive bladder managed? · Limit liquids as directed  Limit liquids to decrease the amount you urinate  Ask how much liquid to drink each day and which liquids are best for you  You may need to avoid drinking liquids several hours before you go to sleep  Your healthcare provider may also recommend that you limit caffeine and alcohol  · Exercise regularly and maintain a healthy weight  Ask your healthcare provider how much you should weigh and about the best exercise plan for you  Extra weight puts pressure on your bladder and may make your symptoms worse  Ask him to help you create a weight loss plan if you are overweight  · Do pelvic muscle exercises often  Your pelvic muscles help you stop urinating  Squeeze these muscles tightly for 5 seconds, then relax for 5 seconds  Gradually work up to squeezing for 10 seconds  Do 3 sets of 15 repetitions a day, or as directed  This will help strengthen your pelvic muscles and improve bladder control  · Train your bladder  Go to the bathroom at set times, such as every 2 hours, even if you do not feel the urge to go  You can also try to hold your urine when you feel the urge to go  For example, hold your urine for 5 minutes when you feel the urge to go  As that becomes easier, hold your urine for 10 minutes  Work up to every 3 or 4 hours to help control your bladder  How is overactive bladder treated? The following treatments may be done if other methods are not working:  · Medicines  may be given to relax your bladder and decrease urination  · Sacral nerve stimulation sends electrical signals to your sacral nerve through a small device implanted under your skin  Your sacral nerve controls your bladder, sphincter, and pelvic floor muscles  · Surgery  may be done if all other treatments cannot help you control your bladder  When should I contact my healthcare provider? · Your urine is pink, or you notice blood in your urine  · You have pain with urination  · You continue to have symptoms even after you take your medicine  · You have questions or concerns about your condition or care  CARE AGREEMENT:   You have the right to help plan your care  Learn about your health condition and how it may be treated   Discuss treatment options with your caregivers to decide what care you want to receive  You always have the right to refuse treatment  The above information is an  only  It is not intended as medical advice for individual conditions or treatments  Talk to your doctor, nurse or pharmacist before following any medical regimen to see if it is safe and effective for you  © 2017 [employer] Information is for End User's use only and may not be sold, redistributed or otherwise used for commercial purposes  All illustrations and images included in CareNotes® are the copyrighted property of A [employer] A M [employer] Inc  or Chris Dowling

## 2019-08-20 NOTE — ASSESSMENT & PLAN NOTE
Insufficient benefit w/detrol daily  Pt requesting rx change - myrbetriq rx'd  If no benefit, advise she call for consult to urogynecology  Dietary triggers reviewed and encouraged she limit (caffeine, citrus, chocolate)

## 2019-08-20 NOTE — ASSESSMENT & PLAN NOTE
Lab Results   Component Value Date    HGBA1C 6 1 08/20/2019       A1C improved from 6 6%  Continue same metformin and f/u with endocrine as scheduled  Foot and eye exams UTD  On ARB daily

## 2019-08-20 NOTE — ASSESSMENT & PLAN NOTE
Mood stable w/long term management per psych and therapist at [facility] w/o recent change in medication regime  Pt satisfied/pleased w/stable mood and wishes to defer care/med refills to us to ease transportation difficulties  I agreed to manage meds as long as mood remains stable, and will refer back to [facility] w/any acute concerns/needs - she voices understanding and will call when refills due

## 2019-08-20 NOTE — PROGRESS NOTES
Assessment/Plan:    Type 2 diabetes mellitus without complication, without long-term current use of insulin ([facility])  Lab Results   Component Value Date    HGBA1C 6 1 08/20/2019       A1C improved from 6 6%  Continue same metformin and f/u with endocrine as scheduled  Foot and eye exams UTD  On ARB daily  Hypertension  BP stable/normal on daily losartan/HCTZ  Continue same and return in 6 months for next BP check  Overactive bladder  Insufficient benefit w/detrol daily  Pt requesting rx change - myrbetriq rx'd  If no benefit, advise she call for consult to urogynecology  Dietary triggers reviewed and encouraged she limit (caffeine, citrus, chocolate)  Depression  Mood stable w/long term management per psych and therapist at [facility] w/o recent change in medication regime  Pt satisfied/pleased w/stable mood and wishes to defer care/med refills to us to ease transportation difficulties  I agreed to manage meds as long as mood remains stable, and will refer back to [facility] w/any acute concerns/needs - she voices understanding and will call when refills due  Hyperlipidemia  FLP stable on daily statin  Continue same dose and recheck in 6 months before next OV  Diagnoses and all orders for this visit:    Type 2 diabetes mellitus with complication, unspecified whether long term insulin use ([facility] [address] )  -     POCT hemoglobin A1c    Morbid obesity with BMI of 45 0-49 9, adult (HCC)    Recurrent major depressive disorder, in full remission ([facility])    Overactive bladder  -     Mirabegron ER (MYRBETRIQ) 50 MG TB24; Take 1 tablet (50 mg total) by mouth daily    Encounter for immunization    Hyperlipidemia, unspecified hyperlipidemia type  -     Comprehensive metabolic panel; Future  -     Lipid panel;  Future  -     Comprehensive metabolic panel  -     Lipid panel    Sinus congestion  -     mometasone (NASONEX) 50 mcg/act nasal spray; Use 2 sprays in each nostril at bedtime as needed    Essential hypertension      She declines mammogram until 2020  Has fit kit at home and states she will complete  Update pap smear in 2021 (pt low risk)  Subjective:      Patient ID: Blue Giordano is a 63 y o  female  Has been well but getting tired of the heat and humidity  Not getting outside much for activity or trips  Can't afford a car to drive  Keeps nasonex on hand to use in case sinus infections are coming on  Has most trouble through the winter  Requests refill  Following at [facility] for many years and has made good progress w/therapy every 2 weeks  Completed anger management and meditation classes  Wants us to take over prozac and seroquel rx's because she has trouble getting there w/transportation issues  Sees MD there every 3-4 months  Has been on prozac for a long time and is very beneficial  Seroquel was started a few years ago to control evening anxiety and panic attacks  Having more urinary troubles this past year even with being on detrol LA  Has cut back on fluids and is a bit better  Occ incontinent w/urgency  Not sure if she's ever been on another med  Has never been to a specialist  Drinks 1 coffee/day and orange juice  The following portions of the patient's history were reviewed and updated as appropriate: allergies, current medications, past medical history, past social history, past surgical history and problem list     Review of Systems   Constitutional: Positive for fatigue  Negative for activity change, appetite change and unexpected weight change  Respiratory: Negative for cough and shortness of breath  Cardiovascular: Negative for chest pain, palpitations and leg swelling  Genitourinary: Positive for frequency and urgency  Psychiatric/Behavioral: Negative for dysphoric mood  The patient is not nervous/anxious            Objective:      /60 (Patient Position: Sitting, Cuff Size: Large)   Pulse 90   Temp (!) 97 1 °F (36 2 °C) (Tympanic)   Ht 5' 4" (1 626 m)   Wt 124 kg (273 lb 12 8 oz)   SpO2 95%   BMI 47 00 kg/m²          Physical Exam   Constitutional: She is oriented to person, place, and time  She appears well-developed and well-nourished  No distress  HENT:   Head: Normocephalic and atraumatic  Eyes: Conjunctivae are normal  No scleral icterus  Neck: No JVD present  Carotid bruit is not present  No thyromegaly present  Cardiovascular: Normal rate and regular rhythm  Pulmonary/Chest: Effort normal and breath sounds normal  No respiratory distress  Lymphadenopathy:     She has no cervical adenopathy  Neurological: She is alert and oriented to person, place, and time  Skin: Skin is warm and dry  Psychiatric: She has a normal mood and affect  Her behavior is normal  Judgment and thought content normal    Vitals reviewed  BMI Counseling: Body mass index is 47 kg/m²  Discussed the patient's BMI with her  The BMI is above average  BMI counseling and education was provided to the patient  Nutrition recommendations include 3-5 servings of fruits/vegetables daily, decreasing soda and/or juice intake, moderation in carbohydrate intake and reducing intake of cholesterol  Exercise recommendations include exercising 3-5 times per week

## 2019-08-24 ENCOUNTER — TELEPHONE (OUTPATIENT)
Dept: FAMILY MEDICINE CLINIC | Facility: HOSPITAL | Age: 59
End: 2019-08-24

## 2019-08-24 NOTE — TELEPHONE ENCOUNTER
Marcelle Rodriguez is denying request for [employer]  She must try and fail all formulary alternatives  The only alternative I did not see she tried is trospium 20mg  If you would like to try her on med, please send rx to pharm

## 2019-08-27 DIAGNOSIS — N39.46 MIXED STRESS AND URGE URINARY INCONTINENCE: Primary | ICD-10-CM

## 2019-08-27 RX ORDER — TROSPIUM CHLORIDE 20 MG/1
20 TABLET, FILM COATED ORAL 2 TIMES DAILY
Qty: 60 TABLET | Refills: 5 | Status: SHIPPED | OUTPATIENT
Start: 2019-08-27 | End: 2020-02-18

## 2019-09-20 DIAGNOSIS — I10 ESSENTIAL HYPERTENSION: ICD-10-CM

## 2019-09-20 DIAGNOSIS — E11.9 TYPE 2 DIABETES MELLITUS WITHOUT COMPLICATION, WITHOUT LONG-TERM CURRENT USE OF INSULIN (HCC): ICD-10-CM

## 2019-09-23 RX ORDER — LOSARTAN POTASSIUM AND HYDROCHLOROTHIAZIDE 25; 100 MG/1; MG/1
TABLET ORAL
Qty: 90 TABLET | Refills: 1 | Status: SHIPPED | OUTPATIENT
Start: 2019-09-23 | End: 2020-01-10

## 2019-11-20 DIAGNOSIS — F33.42 RECURRENT MAJOR DEPRESSIVE DISORDER, IN FULL REMISSION (HCC): Primary | ICD-10-CM

## 2019-11-22 RX ORDER — QUETIAPINE FUMARATE 50 MG/1
50 TABLET, FILM COATED ORAL
Qty: 30 TABLET | Refills: 5 | Status: SHIPPED | OUTPATIENT
Start: 2019-11-22 | End: 2020-04-10

## 2019-11-22 RX ORDER — FLUOXETINE HYDROCHLORIDE 40 MG/1
40 CAPSULE ORAL DAILY
Qty: 30 CAPSULE | Refills: 5 | Status: SHIPPED | OUTPATIENT
Start: 2019-11-22 | End: 2020-04-10

## 2019-11-28 DIAGNOSIS — E28.2 PCOS (POLYCYSTIC OVARIAN SYNDROME): ICD-10-CM

## 2019-11-28 DIAGNOSIS — L68.0 HIRSUTISM: ICD-10-CM

## 2019-11-29 RX ORDER — SPIRONOLACTONE 50 MG/1
TABLET, FILM COATED ORAL
Qty: 180 TABLET | Refills: 4 | Status: SHIPPED | OUTPATIENT
Start: 2019-11-29 | End: 2020-11-30

## 2019-12-15 DIAGNOSIS — R21 RASH: ICD-10-CM

## 2019-12-16 RX ORDER — BETAMETHASONE DIPROPIONATE 0.5 MG/G
CREAM TOPICAL
Qty: 45 G | Refills: 0 | Status: SHIPPED | OUTPATIENT
Start: 2019-12-16

## 2019-12-17 DIAGNOSIS — E11.9 TYPE 2 DIABETES MELLITUS WITHOUT COMPLICATION, WITHOUT LONG-TERM CURRENT USE OF INSULIN (HCC): ICD-10-CM

## 2019-12-23 ENCOUNTER — TELEPHONE (OUTPATIENT)
Dept: FAMILY MEDICINE CLINIC | Facility: HOSPITAL | Age: 59
End: 2019-12-23

## 2019-12-23 DIAGNOSIS — I10 ESSENTIAL HYPERTENSION: Primary | ICD-10-CM

## 2019-12-23 PROCEDURE — 4010F ACE/ARB THERAPY RXD/TAKEN: CPT | Performed by: NURSE PRACTITIONER

## 2019-12-23 RX ORDER — LOSARTAN POTASSIUM 100 MG/1
100 TABLET ORAL DAILY
Qty: 90 TABLET | Refills: 0 | Status: SHIPPED | OUTPATIENT
Start: 2019-12-23 | End: 2020-03-18 | Stop reason: SDUPTHER

## 2019-12-23 RX ORDER — HYDROCHLOROTHIAZIDE 25 MG/1
25 TABLET ORAL DAILY
Qty: 90 TABLET | Refills: 0 | Status: SHIPPED | OUTPATIENT
Start: 2019-12-23 | End: 2020-03-06 | Stop reason: ALTCHOICE

## 2020-01-02 ENCOUNTER — TELEPHONE (OUTPATIENT)
Dept: ENDOCRINOLOGY | Facility: HOSPITAL | Age: 60
End: 2020-01-02

## 2020-01-02 DIAGNOSIS — E11.9 TYPE 2 DIABETES MELLITUS WITHOUT COMPLICATION, WITHOUT LONG-TERM CURRENT USE OF INSULIN (HCC): Primary | ICD-10-CM

## 2020-01-02 DIAGNOSIS — I10 ESSENTIAL HYPERTENSION: ICD-10-CM

## 2020-01-02 DIAGNOSIS — E03.9 ACQUIRED HYPOTHYROIDISM: ICD-10-CM

## 2020-01-02 DIAGNOSIS — E28.2 POLYCYSTIC OVARIAN SYNDROME: ICD-10-CM

## 2020-01-02 DIAGNOSIS — E78.2 MIXED HYPERLIPIDEMIA: ICD-10-CM

## 2020-01-02 NOTE — TELEPHONE ENCOUNTER
Pt rescheduled 20 appt to 3/6/20 due to transportation issues  Her labs will be  by then  Can you reorder  Can be mailed

## 2020-01-10 ENCOUNTER — TELEPHONE (OUTPATIENT)
Dept: FAMILY MEDICINE CLINIC | Facility: HOSPITAL | Age: 60
End: 2020-01-10

## 2020-01-10 ENCOUNTER — OFFICE VISIT (OUTPATIENT)
Dept: FAMILY MEDICINE CLINIC | Facility: HOSPITAL | Age: 60
End: 2020-01-10
Payer: COMMERCIAL

## 2020-01-10 VITALS
HEART RATE: 96 BPM | DIASTOLIC BLOOD PRESSURE: 80 MMHG | OXYGEN SATURATION: 96 % | SYSTOLIC BLOOD PRESSURE: 124 MMHG | WEIGHT: 278 LBS | HEIGHT: 65 IN | TEMPERATURE: 97.2 F | BODY MASS INDEX: 46.32 KG/M2

## 2020-01-10 DIAGNOSIS — I10 ESSENTIAL HYPERTENSION: ICD-10-CM

## 2020-01-10 DIAGNOSIS — H57.13 PAIN OF BOTH EYES: Primary | ICD-10-CM

## 2020-01-10 DIAGNOSIS — N32.81 OVERACTIVE BLADDER: ICD-10-CM

## 2020-01-10 PROCEDURE — 3074F SYST BP LT 130 MM HG: CPT | Performed by: NURSE PRACTITIONER

## 2020-01-10 PROCEDURE — 3079F DIAST BP 80-89 MM HG: CPT | Performed by: NURSE PRACTITIONER

## 2020-01-10 PROCEDURE — 99214 OFFICE O/P EST MOD 30 MIN: CPT | Performed by: NURSE PRACTITIONER

## 2020-01-10 PROCEDURE — 3008F BODY MASS INDEX DOCD: CPT | Performed by: NURSE PRACTITIONER

## 2020-01-10 PROCEDURE — 1036F TOBACCO NON-USER: CPT | Performed by: NURSE PRACTITIONER

## 2020-01-10 NOTE — ASSESSMENT & PLAN NOTE
BP stable on daily losartan/HCTZ but w/possible drying SE on HCTZ  Pt wishes to dc HCTZ and will continue losartan daily as rx'd  Return the end of this month for BP recheck

## 2020-01-10 NOTE — PROGRESS NOTES
Assessment/Plan:    Overactive bladder  Some benefit on sanctura BID but w/bothersome drying side effect  Lengthy discussion re: option to change med vs continuing same  She requests change in med - rx issued to trial myrbetriq and will re-attempt prior auth  Until approved, she will continue sanctura as rx'd  Advise and pt hesitantly agreeable to consulting urology for further eval     Hypertension  BP stable on daily losartan/HCTZ but w/possible drying SE on HCTZ  Pt wishes to dc HCTZ and will continue losartan daily as rx'd  Return the end of this month for BP recheck  Diagnoses and all orders for this visit:    Pain of both eyes  Comments:  probable dry eye from SEs to anticholinergic and diuretic; advise she f/u with ophthamology, offered to make appt w/Dr Shukla President but she wishes to    Overactive bladder  -     Ambulatory referral to Urology; Future  -     Mirabegron ER (MYRBETRIQ) 50 MG TB24; Take 1 tablet (50 mg total) by mouth daily    Essential hypertension      Med list printed and advise she call w/questions upon review of meds at home  Advise she bring all meds to next appt  Subjective:      Patient ID: Kajal Trent is a 63 y o  female  States she is having eye issues since starting HCTZ  Has a long history of eye issues, kylee in left eye  Had no problems with eyes before starting HCTZ  States she is taking twice daily because that is what bottle says  Also takes med for frequent urination twice daily  Sees Dr Shukla President for eye care, was there in April  Eyes feel very dry and has dry mouth an hour after bladder pill  Had good benefit on detrol but pharmacy didn't always have in stock  Insurance did not cover myrbetriq  Hasn't been to urology due to transportation difficulties  Feeling overwhelmed and burned out from getting to all her appointments           The following portions of the patient's history were reviewed and updated as appropriate: allergies, current medications, past medical history, past social history, past surgical history and problem list     Review of Systems   Eyes: Positive for pain (both eyes, L>R)  Negative for discharge, redness, itching and visual disturbance  Genitourinary: Positive for frequency  Psychiatric/Behavioral: The patient is nervous/anxious  Objective:      /80 (Patient Position: Sitting, Cuff Size: Standard)   Pulse 96   Temp (!) 97 2 °F (36 2 °C) (Tympanic)   Ht 5' 5" (1 651 m)   Wt 126 kg (278 lb)   SpO2 96%   BMI 46 26 kg/m²          Physical Exam   Constitutional: She is oriented to person, place, and time  She appears well-developed and well-nourished  No distress  HENT:   Head: Normocephalic and atraumatic  Eyes: Right eye exhibits no discharge  Left eye exhibits no discharge  Right conjunctiva is not injected  Left conjunctiva is not injected  Pulmonary/Chest: Effort normal  No respiratory distress  Neurological: She is alert and oriented to person, place, and time  Psychiatric: Her speech is normal  Thought content normal  Her mood appears anxious  She is agitated  Cognition and memory are normal    Pt relays confusion re: meds, dosages, SEs   Vitals reviewed

## 2020-01-10 NOTE — ASSESSMENT & PLAN NOTE
Some benefit on sanctura BID but w/bothersome drying side effect  Lengthy discussion re: option to change med vs continuing same  She requests change in med - rx issued to trial myrbetriq and will re-attempt prior auth  Until approved, she will continue sanctura as rx'd    Advise and pt hesitantly agreeable to consulting urology for further eval

## 2020-01-10 NOTE — TELEPHONE ENCOUNTER
Pt kept saying over and over again "I'm very confused and just don't know what is what anymore and what I should be taking"  What exactly was she told to stop taking today? She said the med list she received has things crossed out on it but then is still telling her to take them     Can you verify exactly what she should be stopping? (specifically the HCTZ)

## 2020-01-21 ENCOUNTER — TELEPHONE (OUTPATIENT)
Dept: FAMILY MEDICINE CLINIC | Facility: HOSPITAL | Age: 60
End: 2020-01-21

## 2020-01-22 ENCOUNTER — TELEPHONE (OUTPATIENT)
Dept: FAMILY MEDICINE CLINIC | Facility: HOSPITAL | Age: 60
End: 2020-01-22

## 2020-01-22 NOTE — TELEPHONE ENCOUNTER
Auth for myrbetriq was denied    They are requesting pt try and fail oxybutynin, oxytrol (OTC) or solifenacin to be able to cover med

## 2020-01-22 NOTE — TELEPHONE ENCOUNTER
After further investigation  I found pt had tried vesicare (solifenacin) over 10 years ago    Auth resubmitted with new info

## 2020-02-02 DIAGNOSIS — I10 ESSENTIAL HYPERTENSION: Primary | ICD-10-CM

## 2020-02-03 RX ORDER — LOSARTAN POTASSIUM AND HYDROCHLOROTHIAZIDE 25; 100 MG/1; MG/1
TABLET ORAL
Qty: 90 TABLET | Refills: 0 | Status: SHIPPED | OUTPATIENT
Start: 2020-02-03 | End: 2020-02-18

## 2020-02-12 LAB
ALBUMIN SERPL-MCNC: 4.6 G/DL (ref 3.8–4.9)
ALBUMIN/CREAT UR: 5 MG/G CREAT (ref 0–29)
ALBUMIN/GLOB SERPL: 1.8 {RATIO} (ref 1.2–2.2)
ALP SERPL-CCNC: 59 IU/L (ref 39–117)
ALT SERPL-CCNC: 19 IU/L (ref 0–32)
AST SERPL-CCNC: 22 IU/L (ref 0–40)
BILIRUB SERPL-MCNC: 0.2 MG/DL (ref 0–1.2)
BUN SERPL-MCNC: 14 MG/DL (ref 6–24)
BUN/CREAT SERPL: 18 (ref 9–23)
CALCIUM SERPL-MCNC: 9.9 MG/DL (ref 8.7–10.2)
CHLORIDE SERPL-SCNC: 97 MMOL/L (ref 96–106)
CHOLEST SERPL-MCNC: 137 MG/DL (ref 100–199)
CO2 SERPL-SCNC: 25 MMOL/L (ref 20–29)
CREAT SERPL-MCNC: 0.79 MG/DL (ref 0.57–1)
CREAT UR-MCNC: 151 MG/DL
EST. AVERAGE GLUCOSE BLD GHB EST-MCNC: 140 MG/DL
GLOBULIN SER-MCNC: 2.5 G/DL (ref 1.5–4.5)
GLUCOSE SERPL-MCNC: 107 MG/DL (ref 65–99)
HBA1C MFR BLD: 6.5 % (ref 4.8–5.6)
HDLC SERPL-MCNC: 42 MG/DL
LDLC SERPL CALC-MCNC: 56 MG/DL (ref 0–99)
LDLC/HDLC SERPL: 1.3 RATIO (ref 0–3.2)
MICROALBUMIN UR-MCNC: 8 UG/ML
POTASSIUM SERPL-SCNC: 4.8 MMOL/L (ref 3.5–5.2)
PROT SERPL-MCNC: 7.1 G/DL (ref 6–8.5)
SL AMB EGFR AFRICAN AMERICAN: 95 ML/MIN/1.73
SL AMB EGFR NON AFRICAN AMERICAN: 83 ML/MIN/1.73
SL AMB VLDL CHOLESTEROL CALC: 39 MG/DL (ref 5–40)
SODIUM SERPL-SCNC: 140 MMOL/L (ref 134–144)
T4 FREE SERPL-MCNC: 1.28 NG/DL (ref 0.82–1.77)
THYROGLOB AB SERPL-ACNC: 38.9 IU/ML (ref 0–0.9)
THYROPEROXIDASE AB SERPL-ACNC: 36 IU/ML (ref 0–34)
TRIGL SERPL-MCNC: 193 MG/DL (ref 0–149)
TSH SERPL DL<=0.005 MIU/L-ACNC: 1.47 UIU/ML (ref 0.45–4.5)

## 2020-02-12 PROCEDURE — 3061F NEG MICROALBUMINURIA REV: CPT | Performed by: NURSE PRACTITIONER

## 2020-02-18 ENCOUNTER — OFFICE VISIT (OUTPATIENT)
Dept: FAMILY MEDICINE CLINIC | Facility: HOSPITAL | Age: 60
End: 2020-02-18
Payer: COMMERCIAL

## 2020-02-18 VITALS
HEART RATE: 83 BPM | OXYGEN SATURATION: 96 % | DIASTOLIC BLOOD PRESSURE: 76 MMHG | SYSTOLIC BLOOD PRESSURE: 112 MMHG | WEIGHT: 276.4 LBS | TEMPERATURE: 97.7 F | BODY MASS INDEX: 47.19 KG/M2 | HEIGHT: 64 IN

## 2020-02-18 DIAGNOSIS — F33.42 RECURRENT MAJOR DEPRESSIVE DISORDER, IN FULL REMISSION (HCC): ICD-10-CM

## 2020-02-18 DIAGNOSIS — E11.9 TYPE 2 DIABETES MELLITUS WITHOUT COMPLICATION, WITHOUT LONG-TERM CURRENT USE OF INSULIN (HCC): Primary | ICD-10-CM

## 2020-02-18 DIAGNOSIS — E78.2 MIXED HYPERLIPIDEMIA: ICD-10-CM

## 2020-02-18 DIAGNOSIS — F41.1 GENERALIZED ANXIETY DISORDER: ICD-10-CM

## 2020-02-18 DIAGNOSIS — Z12.31 ENCOUNTER FOR SCREENING MAMMOGRAM FOR MALIGNANT NEOPLASM OF BREAST: ICD-10-CM

## 2020-02-18 DIAGNOSIS — Z12.11 SCREENING FOR COLON CANCER: ICD-10-CM

## 2020-02-18 DIAGNOSIS — N32.81 OVERACTIVE BLADDER: ICD-10-CM

## 2020-02-18 PROCEDURE — 3074F SYST BP LT 130 MM HG: CPT | Performed by: NURSE PRACTITIONER

## 2020-02-18 PROCEDURE — 3008F BODY MASS INDEX DOCD: CPT | Performed by: NURSE PRACTITIONER

## 2020-02-18 PROCEDURE — 3078F DIAST BP <80 MM HG: CPT | Performed by: NURSE PRACTITIONER

## 2020-02-18 PROCEDURE — 2022F DILAT RTA XM EVC RTNOPTHY: CPT | Performed by: NURSE PRACTITIONER

## 2020-02-18 PROCEDURE — 99214 OFFICE O/P EST MOD 30 MIN: CPT | Performed by: NURSE PRACTITIONER

## 2020-02-18 PROCEDURE — 1036F TOBACCO NON-USER: CPT | Performed by: NURSE PRACTITIONER

## 2020-02-18 NOTE — ASSESSMENT & PLAN NOTE
FLP stable on daily statin  Triglycerides decreased from 225 to 193  Continue same dose and recheck in 6 months before next OV

## 2020-02-18 NOTE — ASSESSMENT & PLAN NOTE
-Mood stable w/long term medication management  Continue with counseling sessions at [facility] w/o recent changes in medication regimen

## 2020-02-18 NOTE — ASSESSMENT & PLAN NOTE
-Mood stable w/long term medication management  Continue with counseling sessions at [facility] w/o recent changes in medication regimen

## 2020-02-18 NOTE — PROGRESS NOTES
Assessment/Plan:    Hypertension  BP stable on daily losartan/Aldactone  --Pt self D/C'd HCTZ due to possible correlation with eye pain  Patient has not been seen by an ophthalmologist despite recommendations and encouragements to do so  -Pt requested to D/C HCTZ but will continue losartan daily as prescribed  -Return in 3 months for BP recheck  Overactive bladder  -Medication started about a week ago  Does not report real benefit at this time but would like to continue medication   -Previously agreeable to urology consult; however, has not made any appointment due to transportation issues  Hyperlipidemia  FLP stable on daily statin  Triglycerides decreased from 225 to 193  Continue same dose and recheck in 6 months before next OV  Depression  -Mood stable w/long term medication management  Continue with counseling sessions at [facility] w/o recent changes in medication regimen  Anxiety disorder  -Mood stable w/long term medication management  Continue with counseling sessions at [facility] w/o recent changes in medication regimen  Type 2 diabetes mellitus without complication, without long-term current use of insulin (HCC)    Lab Results   Component Value Date    HGBA1C 6 5 (H) 02/11/2020     A1C up slightly from 6 1% as per management w/endocrine  On daily ARB  Foot exam updated today  Advise she establish follow up w/podiatry  Diabetic eye exam scheduled  Diagnoses and all orders for this visit:    Type 2 diabetes mellitus without complication, without long-term current use of insulin (HCC)    Overactive bladder    Generalized anxiety disorder    Recurrent major depressive disorder, in full remission ([facility] [address] )    Encounter for screening mammogram for malignant neoplasm of breast  Comments:  Discussed importance for mammogram screening  Order given  Orders:  -     Mammo screening bilateral w cad;  Future    Screening for colon cancer  Comments:  Discussed/reviewed need for colonoscopy  Pt agreeable to fecal occult blood IA  Orders:  -     Occult Blood, Fecal, IA; Future  -     Occult Blood, Fecal, IA    Mixed hyperlipidemia  -     Lipid panel; Future  -     Lipid panel      Return in 2 months for gyn exam    Mammogram scheduled on 5/29 by MA to coordinate w/next appt here    Subjective:      Patient ID: Pal Bower is a 63 y o  female  Patient is a 62 yo female who presents to the office today for her 6 month follow-up  Pt reported stopping taking HCTZ around December 2019-January 2020 after experiencing eye pain  She is still taking Losartan and Aldactone  Symptoms are gradually improving  Last seen her ophthalmologist summer 2019  Pt complaining about transportation issues that could accommodate to her visit  Never checks her BP at home  Patient feels that [facility] is not working 100%  Has only been on it for the past week  Reports overactive bladder symptoms remain unchanged  Patient reports anxiety/depression well controlled with Seroquel and Prozac  Gets some blues around holidays due to being alone and not seeing her family  Currently being counseled at [facility]  The following portions of the patient's history were reviewed and updated as appropriate: allergies, current medications, past medical history and problem list     Review of Systems   Constitutional: Negative  HENT: Negative  Eyes: Positive for pain (on and off with improving symptoms  Has not seen ophtalmologist since summer 2019)  Respiratory: Negative  Cardiovascular: Negative  Gastrointestinal: Positive for constipation (episodic constipation due to medication  Has been using a stool softener prn with improvement)  Genitourinary: Positive for frequency (reports frequency without having to use a "femine pad")  Musculoskeletal: Negative  Skin: Negative  Neurological: Negative  Psychiatric/Behavioral: Negative            Objective:      /76 (Patient Position: Sitting, Cuff Size: Large)   Pulse 83   Temp 97 7 °F (36 5 °C) (Tympanic)   Ht 5' 4" (1 626 m)   Wt 125 kg (276 lb 6 4 oz)   SpO2 96%   BMI 47 44 kg/m²          Physical Exam   Constitutional: She is oriented to person, place, and time  She appears well-developed and well-nourished  She is active  HENT:   Head: Normocephalic and atraumatic  Eyes: No scleral icterus  Cardiovascular: Normal rate, regular rhythm, S1 normal, S2 normal and normal heart sounds  Pulses are no weak pulses  No murmur heard  Pulses:       Dorsalis pedis pulses are 2+ on the right side, and 2+ on the left side  Pulmonary/Chest: Effort normal and breath sounds normal  No respiratory distress  She has no wheezes  She has no rales  Abdominal: Soft  Bowel sounds are normal  There is no tenderness  Morbidly obese   Musculoskeletal: Normal range of motion  Feet:   Right Foot:   Skin Integrity: Positive for callus  Negative for ulcer, skin breakdown, erythema, warmth or dry skin  Left Foot:   Skin Integrity: Positive for callus  Negative for ulcer, skin breakdown, erythema, warmth or dry skin  Neurological: She is alert and oriented to person, place, and time  Skin: Skin is warm and dry  Capillary refill takes less than 2 seconds  Psychiatric: She has a normal mood and affect  Her speech is normal and behavior is normal  Judgment and thought content normal    Vitals reviewed  Patient's shoes and socks removed  Right Foot/Ankle   Right Foot Inspection  Skin Exam: skin normal, callus and callus no dry skin, no warmth, no erythema, no maceration, no abnormal color, no pre-ulcer and no ulcer                          Toe Exam: ROM and strength within normal limitsno swelling, no tenderness and erythema  Sensory   Vibration: intact  Proprioception: intact   Monofilament testing: intact  Vascular  Capillary refills: < 3 seconds  The right DP pulse is 2+     Right Toe  - Comprehensive Exam  Ecchymosis: none  Arch: normal  Hammertoes: absent  Claw Toes: absent  Swelling: none   Tenderness: none         Left Foot/Ankle  Left Foot Inspection  Skin Exam: skin normal and callusno dry skin, no warmth, no erythema, no maceration, normal color, no pre-ulcer and no ulcer                         Toe Exam: ROM and strength within normal limitsno swelling, no tenderness and no erythema                   Sensory   Vibration: intact  Proprioception: intact  Monofilament: intact  Vascular  Capillary refills: < 3 seconds  The left DP pulse is 2+  Left Toe  - Comprehensive Exam  Ecchymosis: none  Arch: normal  Hammertoes: absent  Claw toes: absent  Swelling: none   Tenderness: none       Assign Risk Category:  No deformity present; No loss of protective sensation;  No weak pulses       Risk: 0

## 2020-02-18 NOTE — ASSESSMENT & PLAN NOTE
BP stable on daily losartan/Aldactone  --Pt self D/C'd HCTZ due to possible correlation with eye pain  Patient has not been seen by an ophthalmologist despite recommendations and encouragements to do so  -Pt requested to D/C HCTZ but will continue losartan daily as prescribed  -Return in 3 months for BP recheck

## 2020-02-18 NOTE — PATIENT INSTRUCTIONS
Hyperlipidemia   AMBULATORY CARE:   Hyperlipidemia  is a high level of lipids (fats) in your blood  These lipids include cholesterol or triglycerides  Lipids are made by your body  They also come from the foods you eat  Your body needs lipids to work properly, but high levels increase your risk for heart disease, heart attack, and stroke  Call 911 for any of the following:   · You have any of the following signs of a heart attack:      ¨ Squeezing, pressure, or pain in your chest that lasts longer than 5 minutes or returns    ¨ Discomfort or pain in your back, neck, jaw, stomach, or arm     ¨ Trouble breathing    ¨ Nausea or vomiting    ¨ Lightheadedness or a sudden cold sweat, especially with chest pain or trouble breathing    · You have any of the following signs of a stroke:      ¨ Numbness or drooping on one side of your face     ¨ Weakness in an arm or leg    ¨ Confusion or difficulty speaking    ¨ Dizziness, a severe headache, or vision loss  Contact your healthcare provider if:   · You have questions or concerns about your condition or care  Treatment of hyperlipidemia  may first include lifestyle changes to help decrease your lipid levels  You may also need to take medicine to lower your lipid levels  Some of the lifestyle changes you may need to make include the following:  · Maintain a healthy weight  Ask your healthcare provider how much you should weigh  Ask him or her to help you create a weight loss plan if you are overweight  Weight loss can decrease your cholesterol and triglyceride levels  · Exercise as directed  Exercise lowers your cholesterol levels and helps you maintain a healthy weight  Get 40 minutes or more of moderate exercise 3 to 4 days each week  You can split your exercise into four 10-minute workouts instead of 40 minutes at one time  Examples of moderate exercises include walking briskly, swimming, or riding a bike   Work with your healthcare provider to plan the best exercise program for you  · Do not smoke  Nicotine and other chemicals in cigarettes and cigars can increase your risk for a heart attack and stroke  Ask your healthcare provider for information if you currently smoke and need help to quit  E-cigarettes or smokeless tobacco still contain nicotine  Talk to your healthcare provider before you use these products  · Eat heart-healthy foods  Talk to your dietitian about a heart-healthy diet  The following will help you manage hyperlipidemia:     ¨ Decrease the total amount of fat you eat  Choose lean meats, fat-free or 1% fat milk, and low-fat dairy products, such as yogurt and cheese  Limit or do not eat red meat  Red meats are high in fat and cholesterol  ¨ Replace unhealthy fats with healthy fats  Unhealthy fats include saturated fat, trans fat, and cholesterol  Choose soft margarines that are low in saturated fat and have little or no trans fat  Monounsaturated fats are healthy fats  These are found in olive oil, canola oil, avocado, and nuts  Polyunsaturated fats are also healthy  These are found in fish, flaxseed, walnuts, and soybeans  ¨ Eat fruits and vegetables every day  They are low in calories and fat and a good source of essential vitamins  Include dark green, red, and orange vegetables  Examples include spinach, kale, broccoli, and carrots  ¨ Eat foods high in fiber  Choose whole grain, high-fiber foods  Good choices include whole-wheat breads or cereals, beans, peas, fruits, and vegetables  · Ask your healthcare provider if it is safe for you to drink alcohol  Alcohol can increase your cholesterol and triglyceride levels  A drink of alcohol is 12 ounces of beer, 5 ounces of wine, or 1½ ounces of liquor  Follow up with your healthcare provider as directed: You may need to return for more tests  Your healthcare provider may refer you to a dietitian  Write down your questions so you remember to ask them during your visits     © 2017 [address] [facility] Information is for End User's use only and may not be sold, redistributed or otherwise used for commercial purposes  All illustrations and images included in CareNotes® are the copyrighted property of [employer]  or Chris Dowling  The above information is an  only  It is not intended as medical advice for individual conditions or treatments  Talk to your doctor, nurse or pharmacist before following any medical regimen to see if it is safe and effective for you  Weight Management   AMBULATORY CARE:   Why it is important to manage your weight:  Being overweight increases your risk of health conditions such as heart disease, high blood pressure, type 2 diabetes, and certain types of cancer  It can also increase your risk for osteoarthritis, sleep apnea, and other respiratory problems  Aim for a slow, steady weight loss  Even a small amount of weight loss can lower your risk of health problems  How to lose weight safely:  A safe and healthy way to lose weight is to eat fewer calories and get regular exercise  You can lose up about 1 pound a week by decreasing the number of calories you eat by 500 calories each day  You can decrease calories by eating smaller portion sizes or by cutting out high-calorie foods  Read labels to find out how many calories are in the foods you eat  You can also burn calories with exercise such as walking, swimming, or biking  You will be more likely to keep weight off if you make these changes part of your lifestyle  Healthy meal plan for weight management:  A healthy meal plan includes a variety of foods, contains fewer calories, and helps you stay healthy  A healthy meal plan includes the following:  · Eat whole-grain foods more often  A healthy meal plan should contain fiber  Fiber is the part of grains, fruits, and vegetables that is not broken down by your body   Whole-grain foods are healthy and provide extra fiber in your diet  Some examples of whole-grain foods are whole-wheat breads and pastas, oatmeal, brown rice, and bulgur  · Eat a variety of vegetables every day  Include dark, leafy greens such as spinach, kale, suleiman greens, and mustard greens  Eat yellow and orange vegetables such as carrots, sweet potatoes, and winter squash  · Eat a variety of fruits every day  Choose fresh or canned fruit (canned in its own juice or light syrup) instead of juice  Fruit juice has very little or no fiber  · Eat low-fat dairy foods  Drink fat-free (skim) milk or 1% milk  Eat fat-free yogurt and low-fat cottage cheese  Try low-fat cheeses such as mozzarella and other reduced-fat cheeses  · Choose meat and other protein foods that are low in fat  Choose beans or other legumes such as split peas or lentils  Choose fish, skinless poultry (chicken or turkey), or lean cuts of red meat (beef or pork)  Before you cook meat or poultry, cut off any visible fat  · Use less fat and oil  Try baking foods instead of frying them  Add less fat, such as margarine, sour cream, regular salad dressing and mayonnaise to foods  Eat fewer high-fat foods  Some examples of high-fat foods include french fries, doughnuts, ice cream, and cakes  · Eat fewer sweets  Limit foods and drinks that are high in sugar  This includes candy, cookies, regular soda, and sweetened drinks  Ways to decrease calories:   · Eat smaller portions  ¨ Use a small plate with smaller servings  ¨ Do not eat second helpings  ¨ When you eat at a restaurant, ask for a box and place half of your meal in the box before you eat  ¨ Share an entrée with someone else  · Replace high-calorie snacks with healthy, low-calorie snacks  ¨ Choose fresh fruit, vegetables, fat-free rice cakes, or air-popped popcorn instead of potato chips, nuts, or chocolate  ¨ Choose water or calorie-free drinks instead of soda or sweetened drinks  · Eat regular meals  Skipping meals can lead to overeating later in the day  Eat a healthy snack in place of a meal if you do not have time to eat a regular meal      · Do not shop for groceries when you are hungry  You may be more likely to make unhealthy food choices  Take a grocery list of healthy foods and shop after you have eaten  Exercise:  Exercise at least 30 minutes per day on most days of the week  Some examples of exercise include walking, biking, dancing, and swimming  You can also fit in more physical activity by taking the stairs instead of the elevator or parking farther away from stores  Ask your healthcare provider about the best exercise plan for you  Other things to consider as you try to lose weight:   · Be aware of situations that may give you the urge to overeat, such as eating while watching television  Find ways to avoid these situations  For example, read a book, go for a walk, or do crafts  · Meet with a weight loss support group or friends who are also trying to lose weight  This may help you stay motivated to continue working on your weight loss goals  © 2017 [facility] Information is for End User's use only and may not be sold, redistributed or otherwise used for commercial purposes  All illustrations and images included in CareNotes® are the copyrighted property of [employer]  or Chris Dowling  The above information is an  only  It is not intended as medical advice for individual conditions or treatments  Talk to your doctor, nurse or pharmacist before following any medical regimen to see if it is safe and effective for you

## 2020-02-18 NOTE — ASSESSMENT & PLAN NOTE
Lab Results   Component Value Date    HGBA1C 6 5 (H) 02/11/2020     A1C up slightly from 6 1% as per management w/endocrine  On daily ARB  Foot exam updated today  Advise she establish follow up w/podiatry  Diabetic eye exam scheduled

## 2020-02-18 NOTE — ASSESSMENT & PLAN NOTE
-Medication started about a week ago  Does not report real benefit at this time but would like to continue medication   -Previously agreeable to urology consult; however, has not made any appointment due to transportation issues

## 2020-03-03 DIAGNOSIS — E03.9 ACQUIRED HYPOTHYROIDISM: ICD-10-CM

## 2020-03-03 DIAGNOSIS — I10 ESSENTIAL HYPERTENSION: Primary | ICD-10-CM

## 2020-03-03 DIAGNOSIS — E78.5 HYPERLIPIDEMIA, UNSPECIFIED HYPERLIPIDEMIA TYPE: ICD-10-CM

## 2020-03-03 RX ORDER — LEVOTHYROXINE SODIUM 112 UG/1
TABLET ORAL
Qty: 90 TABLET | Refills: 6 | Status: SHIPPED | OUTPATIENT
Start: 2020-03-03 | End: 2021-01-04 | Stop reason: SDUPTHER

## 2020-03-03 RX ORDER — SIMVASTATIN 20 MG
TABLET ORAL
Qty: 90 TABLET | Refills: 1 | Status: SHIPPED | OUTPATIENT
Start: 2020-03-03 | End: 2020-08-31

## 2020-03-03 RX ORDER — LOSARTAN POTASSIUM AND HYDROCHLOROTHIAZIDE 25; 100 MG/1; MG/1
TABLET ORAL
Qty: 90 TABLET | Refills: 1 | Status: SHIPPED | OUTPATIENT
Start: 2020-03-03 | End: 2020-03-06 | Stop reason: ALTCHOICE

## 2020-03-06 ENCOUNTER — OFFICE VISIT (OUTPATIENT)
Dept: ENDOCRINOLOGY | Facility: HOSPITAL | Age: 60
End: 2020-03-06
Payer: COMMERCIAL

## 2020-03-06 VITALS
HEIGHT: 64 IN | WEIGHT: 275.4 LBS | DIASTOLIC BLOOD PRESSURE: 70 MMHG | BODY MASS INDEX: 47.02 KG/M2 | SYSTOLIC BLOOD PRESSURE: 112 MMHG | HEART RATE: 90 BPM

## 2020-03-06 DIAGNOSIS — E03.9 ACQUIRED HYPOTHYROIDISM: ICD-10-CM

## 2020-03-06 DIAGNOSIS — E78.2 MIXED HYPERLIPIDEMIA: ICD-10-CM

## 2020-03-06 DIAGNOSIS — E06.3 HYPOTHYROIDISM DUE TO HASHIMOTO'S THYROIDITIS: ICD-10-CM

## 2020-03-06 DIAGNOSIS — E28.2 POLYCYSTIC OVARIAN SYNDROME: ICD-10-CM

## 2020-03-06 DIAGNOSIS — I10 ESSENTIAL HYPERTENSION: ICD-10-CM

## 2020-03-06 DIAGNOSIS — E11.42 DIABETIC POLYNEUROPATHY ASSOCIATED WITH TYPE 2 DIABETES MELLITUS (HCC): ICD-10-CM

## 2020-03-06 DIAGNOSIS — E11.9 TYPE 2 DIABETES MELLITUS WITHOUT COMPLICATION, WITHOUT LONG-TERM CURRENT USE OF INSULIN (HCC): Primary | ICD-10-CM

## 2020-03-06 DIAGNOSIS — E03.8 HYPOTHYROIDISM DUE TO HASHIMOTO'S THYROIDITIS: ICD-10-CM

## 2020-03-06 PROCEDURE — 3078F DIAST BP <80 MM HG: CPT | Performed by: INTERNAL MEDICINE

## 2020-03-06 PROCEDURE — 99215 OFFICE O/P EST HI 40 MIN: CPT | Performed by: INTERNAL MEDICINE

## 2020-03-06 PROCEDURE — 3074F SYST BP LT 130 MM HG: CPT | Performed by: INTERNAL MEDICINE

## 2020-03-06 PROCEDURE — 2022F DILAT RTA XM EVC RTNOPTHY: CPT | Performed by: INTERNAL MEDICINE

## 2020-03-06 PROCEDURE — 1036F TOBACCO NON-USER: CPT | Performed by: INTERNAL MEDICINE

## 2020-03-06 NOTE — PATIENT INSTRUCTIONS
hgba1c is 6 5%  This is good but a bit higher than last one  Continue the same metformin for now  Work on diet with portion control and carbohydrate restriction  Activity as able  the thyroid blood work is normal   Continue the same levothyroxine 112 mcg daily  Recheck  6 months with blood work  Follow up in 1 year with blood work to be ordered

## 2020-03-06 NOTE — PROGRESS NOTES
3/8/2020    Assessment/Plan      Diagnoses and all orders for this visit:    Type 2 diabetes mellitus without complication, without long-term current use of insulin ([facility] )  -     HEMOGLOBIN A1C W/ EAG ESTIMATION Lab Collect; Future  -     Comprehensive metabolic panel Lab Collect; Future  -     TSH, 3rd generation Lab Collect; Future  -     T4, free Lab Collect; Future  -     HEMOGLOBIN A1C W/ EAG ESTIMATION Lab Collect  -     Comprehensive metabolic panel Lab Collect  -     TSH, 3rd generation Lab Collect  -     T4, free Lab Collect  -     metFORMIN (GLUCOPHAGE) 500 mg tablet; Take 2 tablets by mouth every morning and 1 tablet every evening    Polycystic ovarian syndrome  -     HEMOGLOBIN A1C W/ EAG ESTIMATION Lab Collect; Future  -     Comprehensive metabolic panel Lab Collect; Future  -     TSH, 3rd generation Lab Collect; Future  -     T4, free Lab Collect; Future  -     HEMOGLOBIN A1C W/ EAG ESTIMATION Lab Collect  -     Comprehensive metabolic panel Lab Collect  -     TSH, 3rd generation Lab Collect  -     T4, free Lab Collect    Acquired hypothyroidism  -     HEMOGLOBIN A1C W/ EAG ESTIMATION Lab Collect; Future  -     Comprehensive metabolic panel Lab Collect; Future  -     TSH, 3rd generation Lab Collect; Future  -     T4, free Lab Collect; Future  -     HEMOGLOBIN A1C W/ EAG ESTIMATION Lab Collect  -     Comprehensive metabolic panel Lab Collect  -     TSH, 3rd generation Lab Collect  -     T4, free Lab Collect    Essential hypertension  -     HEMOGLOBIN A1C W/ EAG ESTIMATION Lab Collect; Future  -     Comprehensive metabolic panel Lab Collect; Future  -     TSH, 3rd generation Lab Collect; Future  -     T4, free Lab Collect; Future  -     HEMOGLOBIN A1C W/ EAG ESTIMATION Lab Collect  -     Comprehensive metabolic panel Lab Collect  -     TSH, 3rd generation Lab Collect  -     T4, free Lab Collect    Mixed hyperlipidemia  -     HEMOGLOBIN A1C W/ EAG ESTIMATION Lab Collect;  Future  -     Comprehensive metabolic panel Lab Collect; Future  -     TSH, 3rd generation Lab Collect; Future  -     T4, free Lab Collect; Future  -     HEMOGLOBIN A1C W/ EAG ESTIMATION Lab Collect  -     Comprehensive metabolic panel Lab Collect  -     TSH, 3rd generation Lab Collect  -     T4, free Lab Collect    Hypothyroidism due to Hashimoto's thyroiditis    Diabetic polyneuropathy associated with type 2 diabetes mellitus ([facility] )        Assessment/Plan:  1  Type 2 diabetes  Her most recent hemoglobin A1c is 6 5%  This is slightly higher than the last hemoglobin A1c but still quite excellent for a type 2 diabetic  She will continue the same dosage of metformin for now  She will work on portion control and carbohydrate restriction in her diet which she has been lax in recently  She will try to increase her activity as able  2  Polycystic ovary syndrome  She will continue the same metformin and spironolactone for her hirsutism and due to her polycystic ovary syndrome  3  Hypothyroidism due to Hashimoto's thyroiditis  Her most recent thyroid function tests were normal   She does have positive antibodies which do confirm Hashimoto's thyroiditis is the underlying cause of her hypothyroidism  She is both biochemically and clinically euthyroid  She will continue the same levothyroxine 112 mcg daily  4  Hyperlipidemia  Most recent lipids are under reasonable control on her current dose of simvastatin  5  Hypertension  Blood pressure is under good control on her current dose of losartan and spironolactone  5  Diabetic neuropathy  She has minor neuropathic symptoms and a decrease in vibration sensation on exam   Diabetic foot exam was performed in the office today  I have asked her to repeat her blood work in 6 months consisting of hemoglobin A1c, CMP, TSH, and free T4  I have asked her to follow up in 1 year and I will order her blood work after the next blood work in 6 months        CC: Diabetes type 2, PCOS, thyroid, blood pressure, lipid follow-up    History of Present Illness     HPI: Costa Sargent is a 63y o  year old female with type 2 diabetes for 3 years, hypothyroidism, polycystic ovary syndrome, hypertension, hyperlipidemia here for follow-up  She is on oral agents at home and takes metformin 500 mg 2 tablets in the morning and 1 tablet in the evening  She admits to polyuria, polydipsia, and polyphagia  She denies nocturia and blurry vision  She denies numbness or tingling of the feet  She denies chest pain or shortness of breath  She denies nephropathy, retinopathy, heart attack, stroke and claudication but does admit to neuropathy  Hypoglycemic episodes: Yes occasional  Usually if goes too long without eating  The patient's last eye exam was in within the last year  Has an appointment to see the eye doctor, Dr Shane Godoy  The patient's last foot exam was in November 2018 at last endocrine office visit  She does see podiatry about once a year  Last A1C was   Lab Results   Component Value Date    HGBA1C 6 5 (H) 02/11/2020     Blood Sugar/Glucometer/Pump/CGM review: does not check blood sugars  She has hypothyroidism and takes levothyroxine 112 mcg daily  She denies heat or cold intolerance, tremors, palpitation, or weight changes  She is always fatigued  She unfortunately has troubles with sleeping and has been unable to tolerate CPAP  She is constipated  She denies diarrhea, anxiety or depression  She has hypertension and takes losartan 100 mg daily and spironolactone 50 mg twice daily  She denies headache or stroke-like symptoms  She has hyperlipidemia and takes simvastatin 20 mg daily  She denies chest pain or shortness of breath  She has polycystic ovary syndrome and utilizes both spironolactone for hirsutism and metformin for insulin resistance  She has hirsutism on her chin unchanged    She went off her spironolactone within the last year and had worsening hair loss including hairline that has receded further  Her hair has not regrown since restarting spironolactone  Review of Systems   Constitutional: Positive for fatigue  Negative for unexpected weight change  HENT: Negative for trouble swallowing  Eyes: Negative for visual disturbance  Wears glasses  Respiratory: Negative for chest tightness and shortness of breath  Cardiovascular: Negative for chest pain, palpitations and leg swelling  Gastrointestinal: Positive for constipation  Negative for abdominal pain, diarrhea and nausea  Some constipation  Endocrine: Positive for polydipsia, polyphagia and polyuria  Negative for cold intolerance and heat intolerance  Nocturia none  Has a very dry mouth  Some hunger  Genitourinary:        She had frequent urination in summer 2019  HCTZ stopped  She stopped the aldactone in the summer and started to see increase in hair loss  She restarted it  Skin: Negative for rash and wound  Still some hair loss, was worse when off spironolactone  Hair has not grown back when she restarted the spironolactone  Hair line receding further  Has hirsutism on the chin  She shaves once or twice a week  Neurological: Negative for dizziness, tremors, weakness, light-headedness, numbness and headaches  Psychiatric/Behavioral: Positive for sleep disturbance  Negative for dysphoric mood  The patient is not nervous/anxious  Has sleep apnea  Not using CPAP         Historical Information   Past Medical History:   Diagnosis Date    Anxiety     Arthritis     last assessed: June 4, 2012    Depression     Diabetes [facility])     mellitus - Resolved: Nov 18, 2015    Hashimoto's thyroiditis      Past Surgical History:   Procedure Laterality Date    CATARACT EXTRACTION Left     FOOT SURGERY Left     plantars wart removed    TONSILLECTOMY       Social History   Social History     Substance and Sexual Activity   Alcohol Use No     Social History     Substance and Sexual Activity Drug Use No     Social History     Tobacco Use   Smoking Status Never Smoker   Smokeless Tobacco Never Used     Family History:   Family History   Problem Relation Age of Onset    Diabetes Mother         mellitus     Diabetes type II Mother     Mental illness Mother     Esophageal cancer Father     No Known Problems Brother     No Known Problems Brother     No Known Problems Brother        Meds/Allergies   Current Outpatient Medications   Medication Sig Dispense Refill    betamethasone dipropionate (DIPROSONE) 0 05 % cream apply topically to affected area twice a day 45 g 0    cholecalciferol (VITAMIN D3) 1,000 units tablet Take by mouth daily        FLUoxetine (PROzac) 40 MG capsule Take 1 capsule (40 mg total) by mouth daily 30 capsule 5    levothyroxine 112 mcg tablet take 1 tablet by mouth daily 90 tablet 6    losartan (COZAAR) 100 MG tablet Take 1 tablet (100 mg total) by mouth daily 90 tablet 0    metFORMIN (GLUCOPHAGE) 500 mg tablet Take 2 tablets by mouth every morning and 1 tablet every evening 270 tablet 3    Mirabegron ER (MYRBETRIQ) 50 MG TB24 Take 1 tablet (50 mg total) by mouth daily 30 tablet 2    mometasone (NASONEX) 50 mcg/act nasal spray Use 2 sprays in each nostril at bedtime as needed 1 Act 2    MULTIPLE VITAMIN PO Take by mouth daily        QUEtiapine (SEROQUEL) 50 mg tablet Take 1 tablet (50 mg total) by mouth daily at bedtime 30 tablet 5    simvastatin (ZOCOR) 20 mg tablet take 1 tablet by mouth once daily 90 tablet 1    spironolactone (ALDACTONE) 50 mg tablet take 1 tablet by mouth twice a day 180 tablet 4     No current facility-administered medications for this visit  Allergies   Allergen Reactions    Lisinopril Cough     Reaction Date: [id number];        Objective   Vitals: Blood pressure 112/70, pulse 90, height 5' 4" (1 626 m), weight 125 kg (275 lb 6 4 oz)    Invasive Devices     None                 Physical Exam   Constitutional: She is oriented to person, place, and time  She appears well-developed and well-nourished  HENT:   Head: Normocephalic and atraumatic  Eyes: Pupils are equal, round, and reactive to light  Conjunctivae and EOM are normal    No lid lag, stare, proptosis, or periorbital edema  Neck: Normal range of motion  Neck supple  No thyromegaly present  Thyroid normal in size with no palpable thyroid nodules  No carotid bruits  Cardiovascular: Normal rate, regular rhythm and normal heart sounds  Pulses are weak pulses  No murmur heard  Pulses:       Dorsalis pedis pulses are 1+ on the right side, and 1+ on the left side  Posterior tibial pulses are 1+ on the right side, and 1+ on the left side  1+ dorsalis pedis and posterior tibialis pulses bilaterally  Pulmonary/Chest: Effort normal and breath sounds normal  She has no wheezes  Abdominal: Soft  There is no tenderness  Musculoskeletal: Normal range of motion  She exhibits deformity  She exhibits no edema  Callus medial heel bilaterally, left greater then right  Thick skin on heels  No ulcerations of the feet  No tremor of the outstretched hands  Feet:   Right Foot:   Skin Integrity: Positive for callus  Negative for ulcer, skin breakdown, erythema, warmth or dry skin  Left Foot:   Skin Integrity: Positive for callus  Negative for ulcer, skin breakdown, erythema, warmth or dry skin  Lymphadenopathy:     She has no cervical adenopathy  Neurological: She is alert and oriented to person, place, and time  She has normal reflexes  Vibration sensation diminished to the 1st toe DIP joint bilaterally  Microfilament sensation intact bilaterally except to the heels  Skin: Skin is warm and dry  No rash noted  Hirsutism on her chin  Vitals reviewed  Patient's shoes and socks removed  Right Foot/Ankle   Right Foot Inspection  Skin Exam: skin normal, skin intact, callus and callus no dry skin, no warmth, no erythema, no maceration, no abnormal color, no pre-ulcer and no ulcer                          Toe Exam: ROM and strength within normal limits  Sensory   Vibration: diminished    Monofilament testing: intact  Vascular  Capillary refills: < 3 seconds  The right DP pulse is 1+  The right PT pulse is 1+  Left Foot/Ankle  Left Foot Inspection  Skin Exam: skin normal, skin intact and callusno dry skin, no warmth, no erythema, no maceration, normal color, no pre-ulcer and no ulcer                         Toe Exam: ROM and strength within normal limits                   Sensory   Vibration: diminished    Monofilament: intact  Vascular  Capillary refills: < 3 seconds  The left DP pulse is 1+  The left PT pulse is 1+  Assign Risk Category:  No deformity present; Loss of protective sensation; Weak pulses       Risk: 1        The history was obtained from the review of the chart and from the patient  Lab Results:    Most recent Alc is  Lab Results   Component Value Date    HGBA1C 6 5 (H) 02/11/2020           CMP done on 02/11/2020 showed a glucose of 107 fasting but was otherwise normal   Lab Results   Component Value Date    CREATININE 0 79 02/11/2020    CREATININE 0 68 08/09/2019    CREATININE 0 73 02/07/2019    BUN 14 02/11/2020     08/24/2017    K 4 8 02/11/2020    CL 97 02/11/2020    CO2 25 02/11/2020     Total cholesterol 137, LDL cholesterol 56  Lab Results   Component Value Date    CHOL 166 08/24/2017    HDL 42 02/11/2020    TRIG 193 (H) 02/11/2020    CHOLHDL 3 4 08/09/2019       Lab Results   Component Value Date    ALT 19 02/11/2020    AST 22 02/11/2020    ALKPHOS 58 08/24/2017    BILITOT 0 2 08/24/2017       Lab Results   Component Value Date    TSH 1 470 02/11/2020    FREET4 1 28 02/11/2020   Thyroid microsomal antibodies are 36  Antithyroglobulin antibodies are 38 9  Urine microalbumin to creatinine ratios 5       Future Appointments   Date Time Provider Mitzi Ackerman   5/29/2020  1:00 PM JAYSON Castillo DR [facility]   5/29/2020  2:00 PM [facility] 1 [facility]   8/28/2020  1:00 PM JAYSON Hutson DR [facility]   3/11/2021  1:00 PM Jean Marie Salinas MD ENDO [facility]

## 2020-03-08 PROBLEM — E11.42 DIABETIC POLYNEUROPATHY ASSOCIATED WITH TYPE 2 DIABETES MELLITUS (HCC): Status: ACTIVE | Noted: 2020-03-08

## 2020-03-18 DIAGNOSIS — I10 ESSENTIAL HYPERTENSION: ICD-10-CM

## 2020-03-18 PROCEDURE — 4010F ACE/ARB THERAPY RXD/TAKEN: CPT | Performed by: NURSE PRACTITIONER

## 2020-03-18 RX ORDER — LOSARTAN POTASSIUM 100 MG/1
100 TABLET ORAL DAILY
Qty: 90 TABLET | Refills: 1 | Status: SHIPPED | OUTPATIENT
Start: 2020-03-18 | End: 2020-08-28

## 2020-03-19 ENCOUNTER — TELEPHONE (OUTPATIENT)
Dept: FAMILY MEDICINE CLINIC | Facility: HOSPITAL | Age: 60
End: 2020-03-19

## 2020-03-19 NOTE — TELEPHONE ENCOUNTER
Patient asking to go back to Tropsium Chloride 20mg tablets, one daily  Had been on it previously for 2 tablets daily, which was too drying for her  Has recently tried Myrbetriq, but found that there were other side effects that she did not like      pcb

## 2020-03-20 DIAGNOSIS — N32.81 OVERACTIVE BLADDER: Primary | ICD-10-CM

## 2020-03-20 RX ORDER — TROSPIUM CHLORIDE 20 MG/1
TABLET, FILM COATED ORAL
Qty: 90 TABLET | Refills: 1 | Status: SHIPPED | OUTPATIENT
Start: 2020-03-20 | End: 2020-09-28 | Stop reason: SDUPTHER

## 2020-04-02 DIAGNOSIS — I10 ESSENTIAL HYPERTENSION: Primary | ICD-10-CM

## 2020-04-02 RX ORDER — LOSARTAN POTASSIUM AND HYDROCHLOROTHIAZIDE 25; 100 MG/1; MG/1
TABLET ORAL
Qty: 90 TABLET | Refills: 1 | Status: SHIPPED | OUTPATIENT
Start: 2020-04-02 | End: 2020-08-28 | Stop reason: SINTOL

## 2020-04-10 DIAGNOSIS — F33.42 RECURRENT MAJOR DEPRESSIVE DISORDER, IN FULL REMISSION (HCC): ICD-10-CM

## 2020-04-10 RX ORDER — QUETIAPINE FUMARATE 50 MG/1
TABLET, FILM COATED ORAL
Qty: 30 TABLET | Refills: 5 | Status: SHIPPED | OUTPATIENT
Start: 2020-04-10 | End: 2020-10-16

## 2020-04-10 RX ORDER — FLUOXETINE HYDROCHLORIDE 40 MG/1
CAPSULE ORAL
Qty: 30 CAPSULE | Refills: 5 | Status: SHIPPED | OUTPATIENT
Start: 2020-04-10 | End: 2020-10-16

## 2020-05-18 DIAGNOSIS — F33.42 RECURRENT MAJOR DEPRESSIVE DISORDER, IN FULL REMISSION (HCC): ICD-10-CM

## 2020-05-19 RX ORDER — QUETIAPINE FUMARATE 50 MG/1
50 TABLET, FILM COATED ORAL
Qty: 30 TABLET | Refills: 0 | OUTPATIENT
Start: 2020-05-19

## 2020-05-19 RX ORDER — FLUOXETINE HYDROCHLORIDE 40 MG/1
40 CAPSULE ORAL DAILY
Qty: 30 CAPSULE | Refills: 0 | OUTPATIENT
Start: 2020-05-19

## 2020-07-09 LAB
LEFT EYE DIABETIC RETINOPATHY: NORMAL
LEFT EYE DIABETIC RETINOPATHY: NORMAL
RIGHT EYE DIABETIC RETINOPATHY: NORMAL
RIGHT EYE DIABETIC RETINOPATHY: NORMAL

## 2020-08-14 LAB
ALBUMIN SERPL-MCNC: 4.1 G/DL (ref 3.8–4.9)
ALBUMIN/GLOB SERPL: 1.7 {RATIO} (ref 1.2–2.2)
ALP SERPL-CCNC: 54 IU/L (ref 39–117)
ALT SERPL-CCNC: 18 IU/L (ref 0–32)
AST SERPL-CCNC: 18 IU/L (ref 0–40)
BILIRUB SERPL-MCNC: 0.2 MG/DL (ref 0–1.2)
BUN SERPL-MCNC: 10 MG/DL (ref 6–24)
BUN/CREAT SERPL: 15 (ref 9–23)
CALCIUM SERPL-MCNC: 9.4 MG/DL (ref 8.7–10.2)
CHLORIDE SERPL-SCNC: 100 MMOL/L (ref 96–106)
CHOLEST SERPL-MCNC: 141 MG/DL (ref 100–199)
CHOLEST/HDLC SERPL: 3.4 RATIO (ref 0–4.4)
CO2 SERPL-SCNC: 27 MMOL/L (ref 20–29)
CREAT SERPL-MCNC: 0.67 MG/DL (ref 0.57–1)
EST. AVERAGE GLUCOSE BLD GHB EST-MCNC: 151 MG/DL
GLOBULIN SER-MCNC: 2.4 G/DL (ref 1.5–4.5)
GLUCOSE SERPL-MCNC: 112 MG/DL (ref 65–99)
HBA1C MFR BLD: 6.9 % (ref 4.8–5.6)
HDLC SERPL-MCNC: 41 MG/DL
LDLC SERPL CALC-MCNC: 63 MG/DL (ref 0–99)
POTASSIUM SERPL-SCNC: 4.6 MMOL/L (ref 3.5–5.2)
PROT SERPL-MCNC: 6.5 G/DL (ref 6–8.5)
SL AMB EGFR AFRICAN AMERICAN: 112 ML/MIN/1.73
SL AMB EGFR NON AFRICAN AMERICAN: 97 ML/MIN/1.73
SL AMB VLDL CHOLESTEROL CALC: 37 MG/DL (ref 5–40)
SODIUM SERPL-SCNC: 140 MMOL/L (ref 134–144)
T4 FREE SERPL-MCNC: 1.5 NG/DL (ref 0.82–1.77)
TRIGL SERPL-MCNC: 186 MG/DL (ref 0–149)
TSH SERPL DL<=0.005 MIU/L-ACNC: 0.95 UIU/ML (ref 0.45–4.5)

## 2020-08-14 PROCEDURE — 3044F HG A1C LEVEL LT 7.0%: CPT | Performed by: NURSE PRACTITIONER

## 2020-08-28 ENCOUNTER — OFFICE VISIT (OUTPATIENT)
Dept: FAMILY MEDICINE CLINIC | Facility: HOSPITAL | Age: 60
End: 2020-08-28
Payer: COMMERCIAL

## 2020-08-28 VITALS
TEMPERATURE: 98.5 F | SYSTOLIC BLOOD PRESSURE: 122 MMHG | HEIGHT: 64 IN | DIASTOLIC BLOOD PRESSURE: 70 MMHG | BODY MASS INDEX: 47.53 KG/M2 | HEART RATE: 96 BPM | WEIGHT: 278.4 LBS

## 2020-08-28 DIAGNOSIS — F33.42 RECURRENT MAJOR DEPRESSIVE DISORDER, IN FULL REMISSION (HCC): ICD-10-CM

## 2020-08-28 DIAGNOSIS — I10 ESSENTIAL HYPERTENSION: Primary | ICD-10-CM

## 2020-08-28 DIAGNOSIS — E78.2 MIXED HYPERLIPIDEMIA: ICD-10-CM

## 2020-08-28 DIAGNOSIS — E11.9 TYPE 2 DIABETES MELLITUS WITHOUT COMPLICATION, WITHOUT LONG-TERM CURRENT USE OF INSULIN (HCC): ICD-10-CM

## 2020-08-28 PROCEDURE — 3074F SYST BP LT 130 MM HG: CPT | Performed by: NURSE PRACTITIONER

## 2020-08-28 PROCEDURE — 99214 OFFICE O/P EST MOD 30 MIN: CPT | Performed by: NURSE PRACTITIONER

## 2020-08-28 PROCEDURE — 1036F TOBACCO NON-USER: CPT | Performed by: NURSE PRACTITIONER

## 2020-08-28 PROCEDURE — 3008F BODY MASS INDEX DOCD: CPT | Performed by: NURSE PRACTITIONER

## 2020-08-28 PROCEDURE — 3078F DIAST BP <80 MM HG: CPT | Performed by: NURSE PRACTITIONER

## 2020-08-28 PROCEDURE — 2022F DILAT RTA XM EVC RTNOPTHY: CPT | Performed by: NURSE PRACTITIONER

## 2020-08-28 NOTE — ASSESSMENT & PLAN NOTE
Lab Results   Component Value Date    HGBA1C 6 9 (H) 08/13/2020       Management per endocrine  Maintain f/u as planned  Off ARB currently due to concerns for eye side effects  Foot and eye exams UTD

## 2020-08-28 NOTE — PROGRESS NOTES
Assessment/Plan:    Type 2 diabetes mellitus without complication, without long-term current use of insulin (HCC)    Lab Results   Component Value Date    HGBA1C 6 9 (H) 08/13/2020       Management per endocrine  Maintain f/u as planned  Off ARB currently due to concerns for eye side effects  Foot and eye exams UTD  Hypertension  BP stable currently off ARB  RTO in 2 months for next BP check  Depression  Mood stable on meds per [facility]  Continue therapy as scheduled  Hyperlipidemia  FLP at goal except sl elevated trigs  Continue same statin daily and plan to recheck next in 6 months  Diagnoses and all orders for this visit:    Essential hypertension    Type 2 diabetes mellitus without complication, without long-term current use of insulin (HCC)    Mixed hyperlipidemia    Recurrent major depressive disorder, in full remission [facility])      Update gyn exam at next appt in 2 months  She has orders to complete FIT kit and mammogram    Flu shot declined today  Subjective:      Patient ID: Omar Guerrero is a 63 y o  female  States she was having a bad right eye pain months ago and she wondered if it was caused by losartan  She has since stopped using and doesn't feel comfortable restarting bc this is her good eye (hx left eye surgery w/vision disturbance)  She need another FIT kit bc she didn't do first kit the right way  Mammo was canceled due to closure during pandemic  Has been doing monthly virtual therapy sessions which she says has been helping a lot  The following portions of the patient's history were reviewed and updated as appropriate: allergies, current medications, past medical history, past social history, past surgical history and problem list     Review of Systems   Constitutional: Negative  Respiratory: Negative  Cardiovascular: Negative  Psychiatric/Behavioral: Negative for dysphoric mood  The patient is not nervous/anxious  Objective:      /70   Pulse 96   Temp 98 5 °F (36 9 °C)   Ht 5' 4" (1 626 m)   Wt 126 kg (278 lb 6 4 oz)   BMI 47 79 kg/m²          Physical Exam  Vitals signs reviewed  Constitutional:       General: She is not in acute distress  Appearance: Normal appearance  She is obese  HENT:      Head: Normocephalic and atraumatic  Eyes:      General: No scleral icterus  Neck:      Musculoskeletal: Normal range of motion and neck supple  Vascular: No carotid bruit  Cardiovascular:      Rate and Rhythm: Normal rate and regular rhythm  Heart sounds: No murmur  Pulmonary:      Effort: Pulmonary effort is normal       Breath sounds: Normal breath sounds  Lymphadenopathy:      Cervical: No cervical adenopathy  Skin:     General: Skin is warm and dry  Neurological:      General: No focal deficit present  Mental Status: She is alert and oriented to person, place, and time  Psychiatric:         Mood and Affect: Mood normal          Behavior: Behavior normal          Thought Content:  Thought content normal          Judgment: Judgment normal

## 2020-08-28 NOTE — ASSESSMENT & PLAN NOTE
FLP at goal except sl elevated trigs  Continue same statin daily and plan to recheck next in 6 months

## 2020-08-31 DIAGNOSIS — E78.5 HYPERLIPIDEMIA, UNSPECIFIED HYPERLIPIDEMIA TYPE: ICD-10-CM

## 2020-08-31 RX ORDER — SIMVASTATIN 20 MG
TABLET ORAL
Qty: 90 TABLET | Refills: 1 | Status: SHIPPED | OUTPATIENT
Start: 2020-08-31 | End: 2021-01-15

## 2020-09-28 DIAGNOSIS — N32.81 OVERACTIVE BLADDER: ICD-10-CM

## 2020-09-28 RX ORDER — TROSPIUM CHLORIDE 20 MG/1
TABLET, FILM COATED ORAL
Qty: 90 TABLET | Refills: 1 | Status: SHIPPED | OUTPATIENT
Start: 2020-09-28 | End: 2021-04-05

## 2020-10-01 DIAGNOSIS — I10 ESSENTIAL HYPERTENSION: ICD-10-CM

## 2020-10-02 RX ORDER — LOSARTAN POTASSIUM 100 MG/1
TABLET ORAL
Qty: 90 TABLET | Refills: 1 | Status: SHIPPED | OUTPATIENT
Start: 2020-10-02 | End: 2021-04-16 | Stop reason: SDUPTHER

## 2020-10-15 DIAGNOSIS — F33.42 RECURRENT MAJOR DEPRESSIVE DISORDER, IN FULL REMISSION (HCC): ICD-10-CM

## 2020-10-16 RX ORDER — QUETIAPINE FUMARATE 50 MG/1
TABLET, FILM COATED ORAL
Qty: 30 TABLET | Refills: 5 | Status: SHIPPED | OUTPATIENT
Start: 2020-10-16 | End: 2020-11-11 | Stop reason: SDUPTHER

## 2020-10-16 RX ORDER — FLUOXETINE HYDROCHLORIDE 40 MG/1
CAPSULE ORAL
Qty: 30 CAPSULE | Refills: 5 | Status: SHIPPED | OUTPATIENT
Start: 2020-10-16 | End: 2020-11-11 | Stop reason: SDUPTHER

## 2020-11-11 DIAGNOSIS — F33.42 RECURRENT MAJOR DEPRESSIVE DISORDER, IN FULL REMISSION (HCC): ICD-10-CM

## 2020-11-12 RX ORDER — FLUOXETINE HYDROCHLORIDE 40 MG/1
40 CAPSULE ORAL DAILY
Qty: 30 CAPSULE | Refills: 5 | Status: SHIPPED | OUTPATIENT
Start: 2020-11-12 | End: 2021-05-30

## 2020-11-12 RX ORDER — QUETIAPINE FUMARATE 50 MG/1
50 TABLET, FILM COATED ORAL
Qty: 30 TABLET | Refills: 5 | Status: SHIPPED | OUTPATIENT
Start: 2020-11-12 | End: 2021-05-10

## 2020-11-23 LAB — HEMOCCULT STL QL IA: NEGATIVE

## 2020-11-23 RX ORDER — DOCUSATE SODIUM 100 MG/1
100 CAPSULE, LIQUID FILLED ORAL 2 TIMES DAILY
Qty: 90 CAPSULE | Refills: 0 | OUTPATIENT
Start: 2020-11-23

## 2020-11-23 RX ORDER — OMEGA-3S/DHA/EPA/FISH OIL/D3 300MG-1000
400 CAPSULE ORAL DAILY
Qty: 45 TABLET | Refills: 0 | OUTPATIENT
Start: 2020-11-23

## 2020-11-23 RX ORDER — SENNOSIDES 8.6 MG
8.6 TABLET ORAL
Qty: 45 TABLET | Refills: 0 | OUTPATIENT
Start: 2020-11-23

## 2020-11-23 RX ORDER — OMEPRAZOLE 20 MG/1
20 CAPSULE, DELAYED RELEASE ORAL DAILY
Qty: 45 CAPSULE | Refills: 0 | OUTPATIENT
Start: 2020-11-23

## 2020-11-23 RX ORDER — LORATADINE 10 MG/1
10 TABLET ORAL DAILY
Qty: 45 TABLET | Refills: 0 | OUTPATIENT
Start: 2020-11-23

## 2020-11-29 DIAGNOSIS — L68.0 HIRSUTISM: ICD-10-CM

## 2020-11-29 DIAGNOSIS — E28.2 PCOS (POLYCYSTIC OVARIAN SYNDROME): ICD-10-CM

## 2020-11-30 RX ORDER — SPIRONOLACTONE 50 MG/1
TABLET, FILM COATED ORAL
Qty: 180 TABLET | Refills: 4 | Status: SHIPPED | OUTPATIENT
Start: 2020-11-30 | End: 2021-12-02 | Stop reason: SDUPTHER

## 2021-01-04 DIAGNOSIS — E03.9 ACQUIRED HYPOTHYROIDISM: ICD-10-CM

## 2021-01-04 RX ORDER — LEVOTHYROXINE SODIUM 112 UG/1
112 TABLET ORAL DAILY
Qty: 90 TABLET | Refills: 0 | Status: SHIPPED | OUTPATIENT
Start: 2021-01-04 | End: 2021-03-03 | Stop reason: SDUPTHER

## 2021-01-14 DIAGNOSIS — E78.5 HYPERLIPIDEMIA, UNSPECIFIED HYPERLIPIDEMIA TYPE: ICD-10-CM

## 2021-01-15 DIAGNOSIS — E11.9 TYPE 2 DIABETES MELLITUS WITHOUT COMPLICATION, WITHOUT LONG-TERM CURRENT USE OF INSULIN (HCC): ICD-10-CM

## 2021-01-15 RX ORDER — SIMVASTATIN 20 MG
TABLET ORAL
Qty: 90 TABLET | Refills: 1 | Status: SHIPPED | OUTPATIENT
Start: 2021-01-15 | End: 2021-04-16 | Stop reason: SDUPTHER

## 2021-01-21 ENCOUNTER — TELEPHONE (OUTPATIENT)
Dept: ENDOCRINOLOGY | Facility: HOSPITAL | Age: 61
End: 2021-01-21

## 2021-01-21 NOTE — TELEPHONE ENCOUNTER
This is Dr Ever Liz pt  Pt rescheduled 3/11 appt to 4/28  There are no pending labs  Do you want to order labs before that appt  Can be mailed

## 2021-01-22 DIAGNOSIS — E03.8 HYPOTHYROIDISM DUE TO HASHIMOTO'S THYROIDITIS: ICD-10-CM

## 2021-01-22 DIAGNOSIS — E78.2 MIXED HYPERLIPIDEMIA: ICD-10-CM

## 2021-01-22 DIAGNOSIS — E11.9 TYPE 2 DIABETES MELLITUS WITHOUT COMPLICATION, WITHOUT LONG-TERM CURRENT USE OF INSULIN (HCC): Primary | ICD-10-CM

## 2021-01-22 DIAGNOSIS — E06.3 HYPOTHYROIDISM DUE TO HASHIMOTO'S THYROIDITIS: ICD-10-CM

## 2021-01-22 NOTE — TELEPHONE ENCOUNTER
Can we set her up with a hemoglobin A1c, TSH, free T4, comprehensive metabolic panel, fasting lipid panel and urine microalbumin  Please?

## 2021-03-03 DIAGNOSIS — E03.9 ACQUIRED HYPOTHYROIDISM: ICD-10-CM

## 2021-03-03 RX ORDER — LEVOTHYROXINE SODIUM 112 UG/1
112 TABLET ORAL DAILY
Qty: 30 TABLET | Refills: 1 | Status: SHIPPED | OUTPATIENT
Start: 2021-03-03 | End: 2021-07-30 | Stop reason: SDUPTHER

## 2021-04-01 DIAGNOSIS — N32.81 OVERACTIVE BLADDER: ICD-10-CM

## 2021-04-05 RX ORDER — TROSPIUM CHLORIDE 20 MG/1
TABLET, FILM COATED ORAL
Qty: 90 TABLET | Refills: 1 | Status: SHIPPED | OUTPATIENT
Start: 2021-04-05 | End: 2021-09-22 | Stop reason: ALTCHOICE

## 2021-04-06 DIAGNOSIS — E11.9 TYPE 2 DIABETES MELLITUS WITHOUT COMPLICATION, WITHOUT LONG-TERM CURRENT USE OF INSULIN (HCC): ICD-10-CM

## 2021-04-10 LAB
ALBUMIN SERPL-MCNC: 4.3 G/DL (ref 3.8–4.9)
ALBUMIN/CREAT UR: 4 MG/G CREAT (ref 0–29)
ALBUMIN/GLOB SERPL: 1.8 {RATIO} (ref 1.2–2.2)
ALP SERPL-CCNC: 58 IU/L (ref 39–117)
ALT SERPL-CCNC: 19 IU/L (ref 0–32)
AST SERPL-CCNC: 24 IU/L (ref 0–40)
BILIRUB SERPL-MCNC: 0.2 MG/DL (ref 0–1.2)
BUN SERPL-MCNC: 15 MG/DL (ref 6–24)
BUN/CREAT SERPL: 20 (ref 9–23)
CALCIUM SERPL-MCNC: 9.6 MG/DL (ref 8.7–10.2)
CHLORIDE SERPL-SCNC: 98 MMOL/L (ref 96–106)
CHOLEST SERPL-MCNC: 141 MG/DL (ref 100–199)
CO2 SERPL-SCNC: 29 MMOL/L (ref 20–29)
CREAT SERPL-MCNC: 0.75 MG/DL (ref 0.57–1)
CREAT UR-MCNC: 86.2 MG/DL
EST. AVERAGE GLUCOSE BLD GHB EST-MCNC: 154 MG/DL
GLOBULIN SER-MCNC: 2.4 G/DL (ref 1.5–4.5)
GLUCOSE SERPL-MCNC: 113 MG/DL (ref 65–99)
HBA1C MFR BLD: 7 % (ref 4.8–5.6)
HDLC SERPL-MCNC: 36 MG/DL
LDLC SERPL CALC-MCNC: 71 MG/DL (ref 0–99)
LDLC/HDLC SERPL: 2 RATIO (ref 0–3.2)
MICROALBUMIN UR-MCNC: 3.3 UG/ML
POTASSIUM SERPL-SCNC: 4.2 MMOL/L (ref 3.5–5.2)
PROT SERPL-MCNC: 6.7 G/DL (ref 6–8.5)
SL AMB EGFR AFRICAN AMERICAN: 101 ML/MIN/1.73
SL AMB EGFR NON AFRICAN AMERICAN: 88 ML/MIN/1.73
SL AMB VLDL CHOLESTEROL CALC: 34 MG/DL (ref 5–40)
SODIUM SERPL-SCNC: 139 MMOL/L (ref 134–144)
T4 FREE SERPL-MCNC: 1.29 NG/DL (ref 0.82–1.77)
TRIGL SERPL-MCNC: 201 MG/DL (ref 0–149)
TSH SERPL DL<=0.005 MIU/L-ACNC: 2.61 UIU/ML (ref 0.45–4.5)

## 2021-04-10 PROCEDURE — 3051F HG A1C>EQUAL 7.0%<8.0%: CPT | Performed by: INTERNAL MEDICINE

## 2021-04-10 PROCEDURE — 3061F NEG MICROALBUMINURIA REV: CPT | Performed by: INTERNAL MEDICINE

## 2021-04-16 DIAGNOSIS — I10 ESSENTIAL HYPERTENSION: ICD-10-CM

## 2021-04-16 DIAGNOSIS — E78.5 HYPERLIPIDEMIA, UNSPECIFIED HYPERLIPIDEMIA TYPE: ICD-10-CM

## 2021-04-16 RX ORDER — LOSARTAN POTASSIUM 100 MG/1
100 TABLET ORAL DAILY
Qty: 90 TABLET | Refills: 0 | Status: SHIPPED | OUTPATIENT
Start: 2021-04-16 | End: 2021-06-29

## 2021-04-16 RX ORDER — SIMVASTATIN 20 MG
20 TABLET ORAL DAILY
Qty: 90 TABLET | Refills: 0 | Status: SHIPPED | OUTPATIENT
Start: 2021-04-16 | End: 2021-10-03

## 2021-04-16 NOTE — TELEPHONE ENCOUNTER
Pt called stating she is out of meds x a few days    She did reschedule her appt to a Wednesday with [facility] due to transportation

## 2021-04-18 DIAGNOSIS — I10 ESSENTIAL HYPERTENSION: ICD-10-CM

## 2021-04-19 PROCEDURE — 4010F ACE/ARB THERAPY RXD/TAKEN: CPT | Performed by: INTERNAL MEDICINE

## 2021-04-19 RX ORDER — LOSARTAN POTASSIUM 100 MG/1
TABLET ORAL
Qty: 90 TABLET | Refills: 0 | OUTPATIENT
Start: 2021-04-19

## 2021-04-28 ENCOUNTER — OFFICE VISIT (OUTPATIENT)
Dept: ENDOCRINOLOGY | Facility: HOSPITAL | Age: 61
End: 2021-04-28
Payer: COMMERCIAL

## 2021-04-28 ENCOUNTER — OFFICE VISIT (OUTPATIENT)
Dept: FAMILY MEDICINE CLINIC | Facility: HOSPITAL | Age: 61
End: 2021-04-28
Payer: COMMERCIAL

## 2021-04-28 VITALS
DIASTOLIC BLOOD PRESSURE: 76 MMHG | BODY MASS INDEX: 47.73 KG/M2 | HEART RATE: 88 BPM | SYSTOLIC BLOOD PRESSURE: 110 MMHG | HEIGHT: 64 IN | WEIGHT: 279.6 LBS

## 2021-04-28 VITALS
WEIGHT: 280.2 LBS | HEIGHT: 64 IN | SYSTOLIC BLOOD PRESSURE: 118 MMHG | HEART RATE: 90 BPM | TEMPERATURE: 97.5 F | OXYGEN SATURATION: 97 % | BODY MASS INDEX: 47.84 KG/M2 | DIASTOLIC BLOOD PRESSURE: 70 MMHG

## 2021-04-28 DIAGNOSIS — E78.2 MIXED HYPERLIPIDEMIA: ICD-10-CM

## 2021-04-28 DIAGNOSIS — F41.1 GENERALIZED ANXIETY DISORDER: ICD-10-CM

## 2021-04-28 DIAGNOSIS — Z12.31 ENCOUNTER FOR SCREENING MAMMOGRAM FOR BREAST CANCER: ICD-10-CM

## 2021-04-28 DIAGNOSIS — I10 ESSENTIAL HYPERTENSION: ICD-10-CM

## 2021-04-28 DIAGNOSIS — N32.81 OVERACTIVE BLADDER: ICD-10-CM

## 2021-04-28 DIAGNOSIS — E11.9 TYPE 2 DIABETES MELLITUS WITHOUT COMPLICATION, WITHOUT LONG-TERM CURRENT USE OF INSULIN (HCC): ICD-10-CM

## 2021-04-28 DIAGNOSIS — E11.42 DIABETIC POLYNEUROPATHY ASSOCIATED WITH TYPE 2 DIABETES MELLITUS (HCC): ICD-10-CM

## 2021-04-28 DIAGNOSIS — E28.2 POLYCYSTIC OVARIAN SYNDROME: ICD-10-CM

## 2021-04-28 DIAGNOSIS — I10 ESSENTIAL HYPERTENSION: Primary | ICD-10-CM

## 2021-04-28 DIAGNOSIS — E66.01 MORBID OBESITY (HCC): ICD-10-CM

## 2021-04-28 DIAGNOSIS — E11.9 TYPE 2 DIABETES MELLITUS WITHOUT COMPLICATION, WITHOUT LONG-TERM CURRENT USE OF INSULIN (HCC): Primary | ICD-10-CM

## 2021-04-28 DIAGNOSIS — E06.3 HYPOTHYROIDISM DUE TO HASHIMOTO'S THYROIDITIS: ICD-10-CM

## 2021-04-28 DIAGNOSIS — L68.0 HIRSUTISM: ICD-10-CM

## 2021-04-28 DIAGNOSIS — F33.42 RECURRENT MAJOR DEPRESSIVE DISORDER, IN FULL REMISSION (HCC): ICD-10-CM

## 2021-04-28 DIAGNOSIS — Z11.59 ENCOUNTER FOR HEPATITIS C SCREENING TEST FOR LOW RISK PATIENT: ICD-10-CM

## 2021-04-28 DIAGNOSIS — E03.8 HYPOTHYROIDISM DUE TO HASHIMOTO'S THYROIDITIS: ICD-10-CM

## 2021-04-28 PROCEDURE — 3078F DIAST BP <80 MM HG: CPT | Performed by: NURSE PRACTITIONER

## 2021-04-28 PROCEDURE — 3074F SYST BP LT 130 MM HG: CPT | Performed by: NURSE PRACTITIONER

## 2021-04-28 PROCEDURE — 1036F TOBACCO NON-USER: CPT | Performed by: NURSE PRACTITIONER

## 2021-04-28 PROCEDURE — 3725F SCREEN DEPRESSION PERFORMED: CPT | Performed by: NURSE PRACTITIONER

## 2021-04-28 PROCEDURE — 99214 OFFICE O/P EST MOD 30 MIN: CPT | Performed by: NURSE PRACTITIONER

## 2021-04-28 PROCEDURE — 99215 OFFICE O/P EST HI 40 MIN: CPT | Performed by: INTERNAL MEDICINE

## 2021-04-28 PROCEDURE — 3008F BODY MASS INDEX DOCD: CPT | Performed by: INTERNAL MEDICINE

## 2021-04-28 RX ORDER — FINASTERIDE 1 MG/1
1 TABLET, FILM COATED ORAL DAILY
Qty: 30 TABLET | Refills: 5 | Status: SHIPPED | OUTPATIENT
Start: 2021-04-28 | End: 2021-04-28 | Stop reason: ALTCHOICE

## 2021-04-28 NOTE — ASSESSMENT & PLAN NOTE
Has not noted much relief with current therapy although she is not taking it at recommended dosing  Discussed urology and she will need to think about it given her transportation issues

## 2021-04-28 NOTE — PROGRESS NOTES
Assessment/Plan:    Hypertension  BP well controlled  Continue on current regimen  F/U in 6 months  Hyperlipidemia  TG are elevated but LDL is controlled  Continue on current statin therapy  FLP has been ordered by endo  Type 2 diabetes mellitus without complication, without long-term current use of insulin (HCC)    Lab Results   Component Value Date    HGBA1C 7 0 (H) 04/09/2021   A1C is up  Managed by louann LEMON with eye exam  Foot exam done today  Overactive bladder  Has not noted much relief with current therapy although she is not taking it at recommended dosing  Discussed urology and she will need to think about it given her transportation issues  Morbid obesity ([facility] [address] )  Discussed diet and exercise  Depression  Managed by psych  Anxiety disorder  Managed by psych  Diagnoses and all orders for this visit:    Essential hypertension    Mixed hyperlipidemia    Type 2 diabetes mellitus without complication, without long-term current use of insulin (HCC)    Overactive bladder    [facility]    Generalized anxiety disorder    Morbid obesity ([facility] )    Encounter for hepatitis C screening test for low risk patient  -     Hepatitis C antibody; Future  -     Hepatitis C antibody    Encounter for screening mammogram for breast cancer  -     Mammo screening bilateral w 3d & cad; Future          Subjective:      Patient ID: Rosemarie Martin is a 62 y o  female  DM2  Saw endo today  A1C is up a bit  Still with bladder issues  Urinary frequency during the day  Has been referred to urology but has not seen them due to transportation issues  Kennedi Cordova is not really helping  Only taking once/day  Not sure why she isn't taking twice/day  Diabetes  She presents for her follow-up diabetic visit  She has type 2 diabetes mellitus  Her disease course has been worsening  There are no hypoglycemic associated symptoms   Pertinent negatives for hypoglycemia include no dizziness, headaches or nervousness/anxiousness  Pertinent negatives for diabetes include no blurred vision, no chest pain, no fatigue, no foot paresthesias, no foot ulcerations, no polydipsia, no polyphagia, no polyuria, no visual change, no weakness and no weight loss  Risk factors for coronary artery disease include diabetes mellitus, dyslipidemia, obesity, hypertension, sedentary lifestyle and post-menopausal  Current diabetic treatment includes oral agent (monotherapy)  She is compliant with treatment all of the time  She is following a generally healthy diet  An ACE inhibitor/angiotensin II receptor blocker is being taken  She does not see a podiatrist Eye exam is current  Hypertension  This is a chronic problem  The current episode started more than 1 year ago  The problem is controlled  Pertinent negatives include no blurred vision, chest pain, headaches, palpitations, peripheral edema or shortness of breath  Risk factors for coronary artery disease include dyslipidemia, diabetes mellitus, obesity, post-menopausal state and sedentary lifestyle  Past treatments include angiotensin blockers  There are no compliance problems  Hyperlipidemia  This is a chronic problem  The current episode started more than 1 year ago  The problem is controlled  Recent lipid tests were reviewed and are normal (TG elevated)  Pertinent negatives include no chest pain, focal sensory loss, focal weakness, leg pain, myalgias or shortness of breath  Current antihyperlipidemic treatment includes statins  There are no compliance problems  Risk factors for coronary artery disease include diabetes mellitus, dyslipidemia, hypertension, obesity, post-menopausal and a sedentary lifestyle         The following portions of the patient's history were reviewed and updated as appropriate: allergies, current medications, past family history, past medical history, past social history, past surgical history and problem list     Review of Systems   Constitutional: Negative for fatigue, unexpected weight change and weight loss  Eyes: Negative for blurred vision and visual disturbance  Respiratory: Negative for shortness of breath  Cardiovascular: Negative for chest pain, palpitations and leg swelling  Endocrine: Negative for polydipsia, polyphagia and polyuria  Genitourinary: Positive for frequency  Musculoskeletal: Negative for myalgias  Neurological: Negative for dizziness, focal weakness, weakness and headaches  Psychiatric/Behavioral: Negative for dysphoric mood and suicidal ideas  The patient is not nervous/anxious  Objective:  Vitals:    04/28/21 1313   BP: 118/70   Pulse: 90   Temp: 97 5 °F (36 4 °C)   SpO2: 97%      Physical Exam  Vitals signs reviewed  Constitutional:       Appearance: Normal appearance  She is well-developed  She is obese  Cardiovascular:      Rate and Rhythm: Normal rate and regular rhythm  Pulses: no weak pulses          Dorsalis pedis pulses are 2+ on the right side and 2+ on the left side  Posterior tibial pulses are 2+ on the right side and 2+ on the left side  Heart sounds: Normal heart sounds  No murmur  Pulmonary:      Effort: Pulmonary effort is normal       Breath sounds: Normal breath sounds  Feet:      Right foot:      Skin integrity: No ulcer, skin breakdown, erythema, warmth, callus or dry skin  Left foot:      Skin integrity: Callus (medial heel) present  No ulcer, skin breakdown, erythema, warmth or dry skin  Skin:     General: Skin is warm and dry  Neurological:      Mental Status: She is alert and oriented to person, place, and time  Psychiatric:         Mood and Affect: Mood normal          Behavior: Behavior normal          Thought Content: Thought content normal          Judgment: Judgment normal        Patient's shoes and socks removed  Right Foot/Ankle   Right Foot Inspection  Skin Exam: skin normal and skin intact no dry skin, no warmth, no callus, no erythema, no maceration, no abnormal color, no pre-ulcer, no ulcer and no callus                          Toe Exam: ROM and strength within normal limits  Sensory   Vibration: intact    Monofilament testing: intact  Vascular  Capillary refills: < 3 seconds  The right DP pulse is 2+  The right PT pulse is 2+  Left Foot/Ankle  Left Foot Inspection  Skin Exam: skin normal, skin intact and callus (medial heel)no dry skin, no warmth, no erythema, no maceration, normal color, no pre-ulcer and no ulcer                     Callus Size (cm): 3    Toe Exam: ROM and strength within normal limits                   Sensory   Vibration: intact    Monofilament: intact  Vascular  Capillary refills: < 3 seconds  The left DP pulse is 2+  The left PT pulse is 2+  Assign Risk Category:  No deformity present; No loss of protective sensation; No weak pulses       Risk: 0    BMI Counseling: Body mass index is 48 1 kg/m²  The BMI is above normal  Nutrition recommendations include reducing portion sizes, decreasing overall calorie intake, 3-5 servings of fruits/vegetables daily, reducing fast food intake, consuming healthier snacks, decreasing soda and/or juice intake, moderation in carbohydrate intake and increasing intake of lean protein  Exercise recommendations include moderate aerobic physical activity for 150 minutes/week

## 2021-04-28 NOTE — ASSESSMENT & PLAN NOTE
Lab Results   Component Value Date    HGBA1C 7 0 (H) 04/09/2021   A1C is up  Managed by louann LEMON with eye exam  Foot exam done today

## 2021-04-28 NOTE — PROGRESS NOTES
4/28/2021    Assessment/Plan      Diagnoses and all orders for this visit:    Type 2 diabetes mellitus without complication, without long-term current use of insulin ([place] [address] )  -     HEMOGLOBIN A1C W/ EAG ESTIMATION Lab Collect; Future  -     Comprehensive metabolic panel Lab Collect; Future  -     Testosterone, free, total Lab Collect; Future  -     TSH, 3rd generation Lab Collect; Future  -     T4, free Lab Collect; Future  -     HEMOGLOBIN A1C W/ EAG ESTIMATION Lab Collect  -     Comprehensive metabolic panel Lab Collect  -     Testosterone, free, total Lab Collect  -     TSH, 3rd generation Lab Collect  -     T4, free Lab Collect    Diabetic polyneuropathy associated with type 2 diabetes mellitus ([place] [address] )  -     HEMOGLOBIN A1C W/ EAG ESTIMATION Lab Collect; Future  -     Comprehensive metabolic panel Lab Collect; Future  -     Testosterone, free, total Lab Collect; Future  -     TSH, 3rd generation Lab Collect; Future  -     T4, free Lab Collect; Future  -     HEMOGLOBIN A1C W/ EAG ESTIMATION Lab Collect  -     Comprehensive metabolic panel Lab Collect  -     Testosterone, free, total Lab Collect  -     TSH, 3rd generation Lab Collect  -     T4, free Lab Collect    Hypothyroidism due to Hashimoto's thyroiditis  -     HEMOGLOBIN A1C W/ EAG ESTIMATION Lab Collect; Future  -     Comprehensive metabolic panel Lab Collect; Future  -     Testosterone, free, total Lab Collect; Future  -     TSH, 3rd generation Lab Collect; Future  -     T4, free Lab Collect; Future  -     HEMOGLOBIN A1C W/ EAG ESTIMATION Lab Collect  -     Comprehensive metabolic panel Lab Collect  -     Testosterone, free, total Lab Collect  -     TSH, 3rd generation Lab Collect  -     T4, free Lab Collect    Polycystic ovarian syndrome  -     Discontinue: finasteride (PROPECIA) 1 MG tablet; Take 1 tablet (1 mg total) by mouth daily (Patient not taking: Reported on 4/28/2021)  -     HEMOGLOBIN A1C W/ EAG ESTIMATION Lab Collect;  Future  -     Comprehensive metabolic panel Lab Collect; Future  -     Testosterone, free, total Lab Collect; Future  -     TSH, 3rd generation Lab Collect; Future  -     T4, free Lab Collect; Future  -     HEMOGLOBIN A1C W/ EAG ESTIMATION Lab Collect  -     Comprehensive metabolic panel Lab Collect  -     Testosterone, free, total Lab Collect  -     TSH, 3rd generation Lab Collect  -     T4, free Lab Collect    Essential hypertension  -     HEMOGLOBIN A1C W/ EAG ESTIMATION Lab Collect; Future  -     Comprehensive metabolic panel Lab Collect; Future  -     Testosterone, free, total Lab Collect; Future  -     TSH, 3rd generation Lab Collect; Future  -     T4, free Lab Collect; Future  -     HEMOGLOBIN A1C W/ EAG ESTIMATION Lab Collect  -     Comprehensive metabolic panel Lab Collect  -     Testosterone, free, total Lab Collect  -     TSH, 3rd generation Lab Collect  -     T4, free Lab Collect    Mixed hyperlipidemia  -     HEMOGLOBIN A1C W/ EAG ESTIMATION Lab Collect; Future  -     Comprehensive metabolic panel Lab Collect; Future  -     Testosterone, free, total Lab Collect; Future  -     TSH, 3rd generation Lab Collect; Future  -     T4, free Lab Collect; Future  -     HEMOGLOBIN A1C W/ EAG ESTIMATION Lab Collect  -     Comprehensive metabolic panel Lab Collect  -     Testosterone, free, total Lab Collect  -     TSH, 3rd generation Lab Collect  -     T4, free Lab Collect    Hirsutism  -     Discontinue: finasteride (PROPECIA) 1 MG tablet; Take 1 tablet (1 mg total) by mouth daily (Patient not taking: Reported on 4/28/2021)  -     HEMOGLOBIN A1C W/ EAG ESTIMATION Lab Collect; Future  -     Comprehensive metabolic panel Lab Collect; Future  -     Testosterone, free, total Lab Collect; Future  -     TSH, 3rd generation Lab Collect; Future  -     T4, free Lab Collect;  Future  -     HEMOGLOBIN A1C W/ EAG ESTIMATION Lab Collect  -     Comprehensive metabolic panel Lab Collect  -     Testosterone, free, total Lab Collect  -     TSH, 3rd generation Lab Collect  -     T4, free Lab Collect        Assessment/Plan:    1  Type 2 diabetes  Hemoglobin A1c is 7%  This is a bit higher than last visit but not horrible  For now, she will continue the same metformin dosage and work on carbohydrate and portion control with weight loss  2  Diabetic neuropathy  She denies neuropathic symptoms  Diabetic foot exam was performed in the office today  3  Hypothyroidism due to Hashimoto's thyroiditis  Most recent thyroid function tests are normal   She will continue the same levothyroxine 112 mcg daily  4 Polycystic ovary syndrome with hirsutism  She has worsening hair loss and hirsutism on her chin  She is already on spironolactone 50 mg twice a day  One option is increasing the spironolactone but we elected to try adding Propecia 1 mg daily to see if that will improve her symptoms  She is postmenopausal so there is no risk for pregnancy contraindications  She will continue the same metformin  We discussed laser and electrolysis to get rid of any hirsutism that is already present as these medications will only prevent new hair growth  If this does not work or insurance does not cover propecia, then I would increase her spironolactone to 100 mg twice a day  5  Hypertension  Blood pressure is under good control on her current dose of losartan and spironolactone  6  Hyperlipidemia  She will continue the same simvastatin 20 mg daily  I have asked her to follow up in 6 months with preceding hemoglobin A1c, CMP, TSH, free T4, and free and total testosterone levels  CC: Diabetes  Type 2, thyroid, PCOS, blood pressure, lipid follow-up    History of Present Illness     HPI: Wayne Grey is a 62y o  year old female with type 2 diabetes with neuropathy for 4 years, hypothyroidism, PCOS, hypertension, hyperlipidemia for follow-up visit    She is on oral agents at home and takes  Metformin 500 mg 2 tablets in the morning and 1 tablet in the evening  She denies any polyphagia, polydipsia, nocturia and blurry vision  She has daytime polyuria and some dry mouth post some of her medications  She denies chest pain or shortness of breath  She denies numbness or tingling of the feet  She denies nephropathy, retinopathy, heart attack, stroke and claudication but does admit to neuropathy  She was last seen 1 year ago  Hypoglycemic episodes: No never  The patient's last eye exam was in July 2020 with no retinopathy  The patient's last foot exam was in   March 2020 at endocrine office visit  She does see Podiatry once a year  Last A1C was   Lab Results   Component Value Date    HGBA1C 7 0 (H) 04/09/2021     Blood Sugar/Glucometer/Pump/CGM review: does not check blood sugars  She has hypothyroidism due to Hashimoto's thyroiditis and takes levothyroxine 112 mcg daily  Weight is 4 lb more than last year  She does have some fatigue  She has some intermittent constipation  She has significant increase in hair loss particularly in the male pattern  She denies heat or cold intolerance, palpitation, tremors, diarrhea, or insomnia  She denies dry skin or brittle nails  She denies diplopia  She has hypertension and takes losartan 100 mg daily and spironolactone 50 mg  Twice daily for her hirsutism from PCOS  She denies headache or stroke-like symptoms  She has significant worsening of her hirsutism on her chin and significant worsening of her hair loss the male pattern  She has hyperlipidemia and takes simvastatin 20 mg daily  She denies chest pain or shortness of breath  Review of Systems   Constitutional: Positive for fatigue  Negative for unexpected weight change  Weight 4 lb more than last year  Some fatigue  HENT: Negative for trouble swallowing  Eyes: Negative for visual disturbance  Wears glasses  No diplopia  Respiratory: Negative for chest tightness and shortness of breath      Cardiovascular: Negative for chest pain and palpitations  Gastrointestinal: Positive for constipation  Negative for abdominal pain, diarrhea and nausea  Some constipation  Endocrine: Positive for polyuria  Negative for cold intolerance, heat intolerance, polydipsia and polyphagia  Nocturia none  Polyuria during the day  certain medicines give very dry mouth  Genitourinary:        Has a referral to see a urologist for frequent urination  Skin: Negative for wound  No dry skin  No brittle nails  Has hair loss  She has hirsutism which is worse  This significantly worsened when she cut down spironolactone for a short while when had overactive bladder symptoms  Was only of for about 1-2 weeks  Neurological: Negative for dizziness, tremors, weakness, light-headedness, numbness and headaches  Psychiatric/Behavioral: Negative for sleep disturbance         Historical Information   Past Medical History:   Diagnosis Date    Anxiety     Arthritis     last assessed: June 4, 2012    Depression     Diabetes [facility])     mellitus - Resolved: Nov 18, 2015    Hashimoto's thyroiditis      Past Surgical History:   Procedure Laterality Date    CATARACT EXTRACTION Left     FOOT SURGERY Left     plantars wart removed    TONSILLECTOMY       Social History   Social History     Substance and Sexual Activity   Alcohol Use No     Social History     Substance and Sexual Activity   Drug Use No     Social History     Tobacco Use   Smoking Status Never Smoker   Smokeless Tobacco Never Used     Family History:   Family History   Problem Relation Age of Onset    Diabetes Mother         mellitus     Diabetes type II Mother     Mental illness Mother     Esophageal cancer Father     No Known Problems Brother     No Known Problems Brother     No Known Problems Brother        Meds/Allergies   Current Outpatient Medications   Medication Sig Dispense Refill    betamethasone dipropionate (DIPROSONE) 0 05 % cream apply topically to affected area twice a day 45 g 0    cholecalciferol (VITAMIN D3) 1,000 units tablet Take by mouth daily        FLUoxetine (PROzac) 40 MG capsule Take 1 capsule (40 mg total) by mouth daily 30 capsule 5    levothyroxine 112 mcg tablet Take 1 tablet (112 mcg total) by mouth daily 30 tablet 1    losartan (COZAAR) 100 MG tablet Take 1 tablet (100 mg total) by mouth daily 90 tablet 0    metFORMIN (GLUCOPHAGE) 500 mg tablet take 2 tablets by mouth every morning and 1 tablet by mouth every evening 270 tablet 0    mometasone (NASONEX) 50 mcg/act nasal spray Use 2 sprays in each nostril at bedtime as needed (Patient not taking: Reported on 4/28/2021) 1 Act 2    MULTIPLE VITAMIN PO Take by mouth daily        QUEtiapine (SEROquel) 50 mg tablet Take 1 tablet (50 mg total) by mouth daily at bedtime 30 tablet 5    simvastatin (ZOCOR) 20 mg tablet Take 1 tablet (20 mg total) by mouth daily 90 tablet 0    spironolactone (ALDACTONE) 50 mg tablet take 1 tablet by mouth twice a day 180 tablet 4    trospium chloride (SANCTURA) 20 mg tablet take 1 tablet by mouth once daily 90 tablet 1     No current facility-administered medications for this visit  Allergies   Allergen Reactions    Lisinopril Cough     Reaction Date: [id number];     Losartan Eye Swelling       Objective   Vitals: Blood pressure 110/76, pulse 88, height 5' 4" (1 626 m), weight 127 kg (279 lb 9 6 oz)  Invasive Devices     None                 Physical Exam  Vitals signs reviewed  Constitutional:       Appearance: Normal appearance  She is well-developed  She is obese  HENT:      Head: Normocephalic and atraumatic  Eyes:      Extraocular Movements: Extraocular movements intact  Conjunctiva/sclera: Conjunctivae normal       Comments: No lid lag, stare, proptosis, or periorbital edema  Neck:      Musculoskeletal: Normal range of motion and neck supple  Thyroid: No thyromegaly  Vascular: No carotid bruit  Comments: Thyroid normal in size  No palpable thyroid nodules  Cardiovascular:      Rate and Rhythm: Normal rate and regular rhythm  Pulses: Normal pulses  no weak pulses          Dorsalis pedis pulses are 2+ on the right side and 2+ on the left side  Posterior tibial pulses are 2+ on the right side and 2+ on the left side  Heart sounds: Normal heart sounds  No murmur  Pulmonary:      Effort: Pulmonary effort is normal       Breath sounds: Normal breath sounds  No wheezing  Abdominal:      Palpations: Abdomen is soft  Musculoskeletal: Normal range of motion  General: No deformity  Right lower leg: No edema  Left lower leg: Edema present  Comments: Thick callus medial left heel  trace left edema  No ulcerations of the feet  No tremor of the outstretched hands  Feet:      Right foot:      Skin integrity: Callus present  No ulcer, skin breakdown, erythema, warmth or dry skin  Left foot:      Skin integrity: Callus present  No ulcer, skin breakdown, erythema, warmth or dry skin  Lymphadenopathy:      Cervical: No cervical adenopathy  Skin:     General: Skin is warm and dry  Findings: No rash  Neurological:      Mental Status: She is alert and oriented to person, place, and time  Deep Tendon Reflexes: Reflexes are normal and symmetric  Comments: vibration  sensation diminished to the 1st toe DIP joint bilaterally  microfilament sensation intact bilateral except to the heels  Deep tendon reflexes normal      Patient's shoes and socks removed  Right Foot/Ankle   Right Foot Inspection  Skin Exam: skin normal, skin intact, callus and callus no dry skin, no warmth, no erythema, no maceration, no abnormal color, no pre-ulcer and no ulcer                          Toe Exam: ROM and strength within normal limitsno swelling and  no right toe deformity  Sensory   Vibration: diminished    Monofilament testing: intact  Vascular  Capillary refills: < 3 seconds  The right DP pulse is 2+   The right PT pulse is 2+  Left Foot/Ankle  Left Foot Inspection  Skin Exam: skin normal, skin intact and callusno dry skin, no warmth, no erythema, no maceration, normal color, no pre-ulcer and no ulcer                         Toe Exam: ROM and strength within normal limits and swellingno left toe deformity                   Sensory   Vibration: diminished    Monofilament: intact  Vascular  Capillary refills: < 3 seconds  The left DP pulse is 2+  The left PT pulse is 2+  Assign Risk Category:  No deformity present; Loss of protective sensation; No weak pulses       Risk: 1        The history was obtained from the review of the chart and from the patient  Lab Results:    Most recent Alc is  Lab Results   Component Value Date    HGBA1C 7 0 (H) 04/09/2021             Blood work done on 04/09/2021 showed a CMP with a glucose of 113 random but was otherwise normal   Lab Results   Component Value Date    CREATININE 0 75 04/09/2021    CREATININE 0 67 08/13/2020    CREATININE 0 79 02/11/2020    BUN 15 04/09/2021     08/24/2017    K 4 2 04/09/2021    CL 98 04/09/2021    CO2 29 04/09/2021       Total cholesterol done on 04/09/2021 is 141 with an LDL of 71  Lab Results   Component Value Date    CHOL 166 08/24/2017    HDL 36 (L) 04/09/2021    TRIG 201 (H) 04/09/2021    CHOLHDL 3 4 08/13/2020       Lab Results   Component Value Date    ALT 19 04/09/2021    AST 24 04/09/2021    ALKPHOS 58 08/24/2017    BILITOT 0 2 08/24/2017       Lab Results   Component Value Date    TSH 2 610 04/09/2021    FREET4 1 29 04/09/2021        urine microalbumin to creatinine ratio was 4        Future Appointments   Date Time Provider Mitzi Ackerman   11/16/2021 11:40 AM Catherine Vital MD [facility]   11/16/2021  1:00 PM JAYSON Hurst DR [facility]

## 2021-04-28 NOTE — PATIENT INSTRUCTIONS
Obesity   AMBULATORY CARE:   Obesity  is when your body mass index (BMI) is greater than 30  Your healthcare provider will use your height and weight to measure your BMI  The risks of obesity include  many health problems, such as injuries or physical disability  You may need tests to check for the following:  · Diabetes    · High blood pressure or high cholesterol    · Heart disease    · Gallbladder or liver disease    · Cancer of the colon, breast, prostate, liver, or kidney    · Sleep apnea    · Arthritis or gout    Seek care immediately if:   · You have a severe headache, confusion, or difficulty speaking  · You have weakness on one side of your body  · You have chest pain, sweating, or shortness of breath  Contact your healthcare provider if:   · You have symptoms of gallbladder or liver disease, such as pain in your upper abdomen  · You have knee or hip pain and discomfort while walking  · You have symptoms of diabetes, such as intense hunger and thirst, and frequent urination  · You have symptoms of sleep apnea, such as snoring or daytime sleepiness  · You have questions or concerns about your condition or care  Treatment for obesity  focuses on helping you lose weight to improve your health  Even a small decrease in BMI can reduce the risk for many health problems  Your healthcare provider will help you set a weight-loss goal   · Lifestyle changes  are the first step in treating obesity  These include making healthy food choices and getting regular physical activity  Your healthcare provider may suggest a weight-loss program that involves coaching, education, and therapy  · Medicine  may help you lose weight when it is used with a healthy diet and physical activity  · Surgery  can help you lose weight if you are very obese and have other health problems  There are several types of weight-loss surgery  Ask your healthcare provider for more information      Be successful losing weight:   · Set small, realistic goals  An example of a small goal is to walk for 20 minutes 5 days a week  Anther goal is to lose 5% of your body weight  · Tell friends, family members, and coworkers about your goals  and ask for their support  Ask a friend to lose weight with you, or join a weight-loss support group  · Identify foods or triggers that may cause you to overeat , and find ways to avoid them  Remove tempting high-calorie foods from your home and workplace  Place a bowl of fresh fruit on your kitchen counter  If stress causes you to eat, then find other ways to cope with stress  · Keep a diary to track what you eat and drink  Also write down how many minutes of physical activity you do each day  Weigh yourself once a week and record it in your diary  Eating changes: You will need to eat 500 to 1,000 fewer calories each day than you currently eat to lose 1 to 2 pounds a week  The following changes will help you cut calories:  · Eat smaller portions  Use small plates, no larger than 9 inches in diameter  Fill your plate half full of fruits and vegetables  Measure your food using measuring cups until you know what a serving size looks like  · Eat 3 meals and 1 or 2 snacks each day  Plan your meals in advance  Nichole Figures and eat at home most of the time  Eat slowly  Do not skip meals  Skipping meals can lead to overeating later in the day  This can make it harder for you to lose weight  Talk with a dietitian to help you make a meal plan and schedule that is right for you  · Eat fruits and vegetables at every meal   They are low in calories and high in fiber, which makes you feel full  Do not add butter, margarine, or cream sauce to vegetables  Use herbs to season steamed vegetables  · Eat less fat and fewer fried foods  Eat more baked or grilled chicken and fish  These protein sources are lower in calories and fat than red meat  Limit fast food   Dress your salads with olive oil and vinegar instead of bottled dressing  · Limit the amount of sugar you eat  Do not drink sugary beverages  Limit alcohol  Activity changes:  Physical activity is good for your body in many ways  It helps you burn calories and build strong muscles  It decreases stress and depression, and improves your mood  It can also help you sleep better  Talk to your healthcare provider before you begin an exercise program   · Exercise for at least 30 minutes 5 days a week  Start slowly  Set aside time each day for physical activity that you enjoy and that is convenient for you  It is best to do both weight training and an activity that increases your heart rate, such as walking, bicycling, or swimming  · Find ways to be more active  Do yard work and housecleaning  Walk up the stairs instead of using elevators  Spend your leisure time going to events that require walking, such as outdoor festivals or fairs  This extra physical activity can help you lose weight and keep it off  Follow up with your healthcare provider as directed: You may need to meet with a dietitian  Write down your questions so you remember to ask them during your visits  © Copyright [facility] [address] Information is for End User's use only and may not be sold, redistributed or otherwise used for commercial purposes  All illustrations and images included in CareNotes® are the copyrighted property of [employer]  or [facility] Arlene Claire   The above information is an  only  It is not intended as medical advice for individual conditions or treatments  Talk to your doctor, nurse or pharmacist before following any medical regimen to see if it is safe and effective for you

## 2021-04-28 NOTE — ASSESSMENT & PLAN NOTE
TG are elevated but LDL is controlled  Continue on current statin therapy  FLP has been ordered by endo

## 2021-04-28 NOTE — PATIENT INSTRUCTIONS
The hgba1c is 7 0%  this is a bit higher  Continue the same metformin for now  watch the cabs and portions and work on diet dn weight loss  the thyroid is normal    Continue the same synthroid  the hair on the chin is a bit worse  We can try propecia 1 mg daily  Continue the same spironolactone for now  Follow up in 6 months with blood work  Yes

## 2021-05-06 ENCOUNTER — TELEPHONE (OUTPATIENT)
Dept: ENDOCRINOLOGY | Facility: HOSPITAL | Age: 61
End: 2021-05-06

## 2021-05-06 NOTE — TELEPHONE ENCOUNTER
Patient called in to request that you phone in the medication that was discussed at visit to the [facility] in [facility]

## 2021-05-07 DIAGNOSIS — F33.42 RECURRENT MAJOR DEPRESSIVE DISORDER, IN FULL REMISSION (HCC): ICD-10-CM

## 2021-05-10 RX ORDER — QUETIAPINE FUMARATE 50 MG/1
TABLET, FILM COATED ORAL
Qty: 30 TABLET | Refills: 5 | Status: SHIPPED | OUTPATIENT
Start: 2021-05-10 | End: 2021-11-05

## 2021-05-12 DIAGNOSIS — L68.0 HIRSUTISM: ICD-10-CM

## 2021-05-12 DIAGNOSIS — L65.9 HAIR LOSS: Primary | ICD-10-CM

## 2021-05-12 RX ORDER — FINASTERIDE 1 MG/1
1 TABLET, FILM COATED ORAL DAILY
Qty: 30 TABLET | Refills: 5 | Status: SHIPPED | OUTPATIENT
Start: 2021-05-12

## 2021-05-12 NOTE — TELEPHONE ENCOUNTER
Patient called back and said yes it was for the [address]  She would like it to go to [facility] in [facility]

## 2021-05-13 ENCOUNTER — TELEPHONE (OUTPATIENT)
Dept: ENDOCRINOLOGY | Facility: HOSPITAL | Age: 61
End: 2021-05-13

## 2021-05-30 DIAGNOSIS — F33.42 RECURRENT MAJOR DEPRESSIVE DISORDER, IN FULL REMISSION (HCC): ICD-10-CM

## 2021-05-30 RX ORDER — FLUOXETINE HYDROCHLORIDE 40 MG/1
CAPSULE ORAL
Qty: 30 CAPSULE | Refills: 5 | Status: SHIPPED | OUTPATIENT
Start: 2021-05-30 | End: 2021-11-05

## 2021-06-29 DIAGNOSIS — I10 ESSENTIAL HYPERTENSION: ICD-10-CM

## 2021-06-29 RX ORDER — LOSARTAN POTASSIUM 100 MG/1
TABLET ORAL
Qty: 90 TABLET | Refills: 0 | Status: SHIPPED | OUTPATIENT
Start: 2021-06-29 | End: 2021-08-28

## 2021-07-30 DIAGNOSIS — E03.9 ACQUIRED HYPOTHYROIDISM: ICD-10-CM

## 2021-07-30 RX ORDER — LEVOTHYROXINE SODIUM 112 UG/1
112 TABLET ORAL DAILY
Qty: 30 TABLET | Refills: 5 | Status: SHIPPED | OUTPATIENT
Start: 2021-07-30 | End: 2022-01-05

## 2021-08-28 DIAGNOSIS — I10 ESSENTIAL HYPERTENSION: ICD-10-CM

## 2021-08-28 RX ORDER — LOSARTAN POTASSIUM 100 MG/1
TABLET ORAL
Qty: 90 TABLET | Refills: 0 | Status: SHIPPED | OUTPATIENT
Start: 2021-08-28 | End: 2021-10-03

## 2021-08-30 DIAGNOSIS — E11.9 TYPE 2 DIABETES MELLITUS WITHOUT COMPLICATION, WITHOUT LONG-TERM CURRENT USE OF INSULIN (HCC): ICD-10-CM

## 2021-09-22 ENCOUNTER — OFFICE VISIT (OUTPATIENT)
Dept: UROLOGY | Facility: HOSPITAL | Age: 61
End: 2021-09-22
Payer: COMMERCIAL

## 2021-09-22 VITALS
WEIGHT: 280 LBS | HEIGHT: 64 IN | BODY MASS INDEX: 47.8 KG/M2 | HEART RATE: 84 BPM | SYSTOLIC BLOOD PRESSURE: 118 MMHG | DIASTOLIC BLOOD PRESSURE: 72 MMHG

## 2021-09-22 DIAGNOSIS — N32.81 OAB (OVERACTIVE BLADDER): Primary | ICD-10-CM

## 2021-09-22 DIAGNOSIS — N39.41 URGE URINARY INCONTINENCE: ICD-10-CM

## 2021-09-22 LAB
SL AMB  POCT GLUCOSE, UA: NORMAL
SL AMB LEUKOCYTE ESTERASE,UA: NORMAL
SL AMB POCT BILIRUBIN,UA: NORMAL
SL AMB POCT BLOOD,UA: NORMAL
SL AMB POCT CLARITY,UA: CLEAR
SL AMB POCT COLOR,UA: YELLOW
SL AMB POCT KETONES,UA: NORMAL
SL AMB POCT NITRITE,UA: NORMAL
SL AMB POCT PH,UA: 8.5
SL AMB POCT SPECIFIC GRAVITY,UA: 1.01
SL AMB POCT URINE PROTEIN: NORMAL
SL AMB POCT UROBILINOGEN: 0.2

## 2021-09-22 PROCEDURE — 3078F DIAST BP <80 MM HG: CPT | Performed by: NURSE PRACTITIONER

## 2021-09-22 PROCEDURE — 99204 OFFICE O/P NEW MOD 45 MIN: CPT | Performed by: NURSE PRACTITIONER

## 2021-09-22 PROCEDURE — 3074F SYST BP LT 130 MM HG: CPT | Performed by: NURSE PRACTITIONER

## 2021-09-22 PROCEDURE — 3008F BODY MASS INDEX DOCD: CPT | Performed by: NURSE PRACTITIONER

## 2021-09-22 PROCEDURE — 1036F TOBACCO NON-USER: CPT | Performed by: NURSE PRACTITIONER

## 2021-09-22 PROCEDURE — 81002 URINALYSIS NONAUTO W/O SCOPE: CPT | Performed by: NURSE PRACTITIONER

## 2021-09-22 NOTE — PATIENT INSTRUCTIONS
BLADDER HEALTH    WHAT IS CONSIDERED NORMAL?  The average bladder can hold about 2 cups of urine before it needs to be emptied   The normal range of voiding urine is 6 to 8 times during a 24 hour period  As we get older, our bladder capacity can get smaller and we may need to pass urine more frequently but usually not more than every 2 hours   Urine should flow easily without discomfort in a good, steady stream until the bladder is empty  No pushing or straining is necessary to empty the bladder   An urge is a signal that you feel as the bladder stretches to fill with urine  Urges can be felt even if the bladder is not full  Urges are not commands to go to the toilet, merely a signal and can be controlled  WHAT ARE GOOD BLADDER HABITS?  Take your time when emptying your bladder  Dont strain or push to empty your bladder  Make sure you empty your bladder completely each time you pass urine  Do not rush the process   Consistently ignoring the urge to go (waiting more than 4 hours between toileting) or urinating too infrequently may be convenient but not healthy for your bladder   Avoid going to the toilet just in case or more often than every 2 hours  It is usually not necessary to go when you feel the first urge  Try to go only when your bladder is full  Urgency and frequency of urination can be improved by retraining the bladder and spacing your fluid intake throughout the day  Practice good toilet habits  Dont let your bladder control your life  TIPS TO MAINTAIN GOOD BLADDER HABITS   Maintain a good fluid intake  Depending on your body size and environment, drink 6 -8 cups (8 oz each) of fluid per day unless otherwise advised by your doctor  Not enough fluid creates a foul odor and dark color of the urine     Limit the amount of caffeine (coffee, cola, chocolate or tea) and citrus foods that you consume as these foods can be associated with increased sensation of urinary urgency and frequency   Limit the amount of alcohol you drink  Alcohol increases urine production and makes it difficult for the brain to coordinate bladder control   Avoid constipation by maintaining a balanced diet of dietary fiber   Cigarette smoking is also irritating to the bladder surface and is associated with bladder cancer  In addition, the coughing associated with smoking may lead to increased incontinent episodes because of the increased pressure  HOW DIET CAN AFFECT YOUR BLADDER  Although there is no particular "diet" that can cure bladder control, there are certain dietary suggestions you can use to help control the problem  There are 2 points to consider when evaluating how your habits and diet may affect your bladder:    1  Foods and fluids can irritate the bladder  Some foods and beverages are thought to contribute to bladder leakage and irritability  However their effect on the bladder is not completely understood and you may want to see if eliminating one or all of these items improves your bladder control  If you are unable to give them up completely, it is recommended that you use the following items in moderation:   Acidic beverages and foods (orange juice, grapefruit juice, lemonade etc)   Alcoholic beverages   Vinegar   Coffee (regular and decaf)   Tea (regular and decaf)   Caffeinated beverages   Carbonated beverages          2  Drinking enough and the right kinds of fluids  Many people with bladder control issues decrease their intake of liquids in hope that they will need to urinate less frequently or have less urinary leakage   You should not restrict fluids to control your bladder  While a decrease in liquid intake does result in a decrease in the volume of urine, the smaller amount of urine may be more highly concentrated         Highly concentrated, dark yellow urine is irritating to the bladder surface and may actually cause you to go to the bathroom more frequently   It also encourages the growth of bacteria, which may lead to infections resulting in incontinence   Substitutions for Bladder Irritants: water is always the best beverage choice  Grape and apple juice are thirst quenchers are good selections and are not as irritating to the bladder   o Low acid fruits:  Pears, apricots, papaya, watermelon  o For coffee drinkers: KAVA®, Postum®, Jama®, Kaffree Linh®  o For tea drinkers:  non-citrus or herbal and sun brewed tea  Kegel Exercises for Women   AMBULATORY CARE:   Kegel exercises  help strengthen your pelvic muscles  Pelvic muscles hold your pelvic organs, such as your bladder and uterus, in place  Kegel exercises help prevent or control problems with urine incontinence (leakage)  Incontinence may be caused by pregnancy, childbirth, or menopause  Contact your healthcare provider if:   · You cannot feel your muscles tighten or relax  · You continue to leak urine  · You have questions or concerns about your condition or care  Use the correct muscles:  Pelvic muscles are the muscles you use to control urine flow  To target these muscles, stop and start the flow of urine several times  This will help you become familiar with how it feels to tighten and relax these muscles  How to do Kegel exercises:   · Empty your bladder  You may lie down, stand up, or sit down to do these exercises  When you first try to do these exercises, it may be easier if you lie down  Tighten or squeeze your pelvic muscles slowly  It may feel like you are trying to hold back urine or gas  Hold this position for 3 seconds  Relax for 3 seconds  Repeat this cycle 10 times  · Do 10 sets of Kegel exercises, at least 3 times a day  Do not hold your breath when you do Kegel exercises  Keep your stomach, back, and leg muscles relaxed  · As your muscles get stronger, you will be able to hold the squeeze longer   Your healthcare provider may ask that you increase your pelvic muscle squeeze to 10 seconds  After you squeeze for 10 seconds, relax for 10 seconds  What else you should know:   · Once you know how to do Kegel exercises, use different positions  You can do these exercises while you lie on the floor, sit at your desk or watch TV, and while you stand  · You may notice improved bladder control within about 6 weeks  · Tighten your pelvic muscles before you sneeze, cough, or lift to prevent urine leakage  Follow up with your healthcare provider as directed:  Write down your questions so you remember to ask them during your visits  © Copyright [employer] 2021 Information is for End User's use only and may not be sold, redistributed or otherwise used for commercial purposes  All illustrations and images included in CareNotes® are the copyrighted property of [employer]  or Monie Pablo  The above information is an  only  It is not intended as medical advice for individual conditions or treatments  Talk to your doctor, nurse or pharmacist before following any medical regimen to see if it is safe and effective for you

## 2021-09-22 NOTE — PROGRESS NOTES
9/22/2021    Monika Flanagan  1960  [id number]        Assessment  -Overactive bladder  -Urinary urge incontinence     Discussion/Plan  Rena Kelley is a 62 y o  female who presents in consultation     1  Overactive bladder, urinary urge incontinence-   Urine dip in the office today peers negative for infection or blood  We had a long discussion regarding her lower urinary tract symptoms of overactive bladder and urge incontinence  Reviewed dietary and behavioral modifications  Encouraged patient to increase water intake and we discussed Kegel exercises  We discussed that if this is ineffective, would recommend referral to pelvic floor physical therapy  Patient would like to try alternative medication as she has not noticed any improvement to trospium  Due to her reports of chronic eye dryness and pain, would recommend avoiding anticholinergics  Prescription for Myrbetriq 25 mg daily was electronically sent to her pharmacy  Follow-up in 3 months to re-evaluate her urinary pattern and perform PVR assessment  She was instructed to call sooner with any issues     -All questions answered, patients agree with plan     History of Present Illness  62 y o  female who presents in consultation today for evaluation of urinary frequency  Patient referred by her PCP  She reports longstanding history of urinary frequency, urgency, and urge incontinence  Patient states symptoms have gotten worse over the last 1-2 years  She has been on trospium 20mg daily, managed by her PCP  Patient states she had been prescribed a different medication prior to trospium, but developed side effects of eye dryness and eye pain  Patient denies any gross hematuria or dysuria  She denies any prior urologic history, surgical intervention, or instrumentation  Review of Systems  Review of Systems   Constitutional: Negative  HENT: Negative  Respiratory: Negative  Cardiovascular: Negative  Gastrointestinal: Negative  Genitourinary: Positive for frequency and urgency  Negative for decreased urine volume, difficulty urinating, dysuria, flank pain and hematuria  Musculoskeletal: Negative  Skin: Negative  Neurological: Negative  Psychiatric/Behavioral: Negative  Past Medical History  Past Medical History:   Diagnosis Date    Anxiety     Arthritis     last assessed: June 4, 2012    Depression     Diabetes [facility])     mellitus - Resolved: Nov 18, 2015    Hashimoto's thyroiditis        Past Social History  Past Surgical History:   Procedure Laterality Date    CATARACT EXTRACTION Left     FOOT SURGERY Left     plantars wart removed    TONSILLECTOMY         Past Family History  Family History   Problem Relation Age of Onset    Diabetes Mother         mellitus     Diabetes type II Mother     Mental illness Mother     Esophageal cancer Father     No Known Problems Brother     No Known Problems Brother     No Known Problems Brother        Past Social history  Social History     Socioeconomic History    Marital status:      Spouse name: Not on file    Number of children: Not on file    Years of education: Not on file    Highest education level: Not on file   Occupational History    Not on file   Tobacco Use    Smoking status: Never Smoker    Smokeless tobacco: Never Used   Vaping Use    Vaping Use: Never used   Substance and Sexual Activity    Alcohol use: No    Drug use: No    Sexual activity: Not on file   Other Topics Concern    Not on file   Social History Narrative    Lives alone without help available     Martial history:      Travels by public transportation      Social Determinants of Health     Financial Resource Strain:     Difficulty of Paying Living Expenses:    Food Insecurity:     Worried About Running Out of Food in the Last Year:     Ran Out of Food in the Last Year:    Transportation Needs:     Lack of Transportation (Medical):      Lack of Transportation (Non-Medical):    Physical Activity:     Days of Exercise per Week:     Minutes of Exercise per Session:    Stress:     Feeling of Stress :    Social Connections:     Frequency of Communication with Friends and Family:     Frequency of Social Gatherings with Friends and Family:     Attends Mu-ism Services:     Active Member of Clubs or Organizations:     Attends Club or Organization Meetings:     Marital Status:    Intimate Partner Violence:     Fear of Current or Ex-Partner:     Emotionally Abused:     Physically Abused:     Sexually Abused:        Current Medications  Current Outpatient Medications   Medication Sig Dispense Refill    betamethasone dipropionate (DIPROSONE) 0 05 % cream apply topically to affected area twice a day 45 g 0    cholecalciferol (VITAMIN D3) 1,000 units tablet Take by mouth daily        finasteride (PROPECIA) 1 MG tablet Take 1 tablet (1 mg total) by mouth daily 30 tablet 5    FLUoxetine (PROzac) 40 MG capsule take 1 capsule by mouth once daily 30 capsule 5    levothyroxine 112 mcg tablet Take 1 tablet (112 mcg total) by mouth daily 30 tablet 5    losartan (COZAAR) 100 MG tablet take 1 tablet by mouth once daily 90 tablet 0    metFORMIN (GLUCOPHAGE) 500 mg tablet take 2 tablets by mouth every morning and 1 tablet by mouth every evening 270 tablet 0    mometasone (NASONEX) 50 mcg/act nasal spray Use 2 sprays in each nostril at bedtime as needed (Patient not taking: Reported on 4/28/2021) 1 Act 2    MULTIPLE VITAMIN PO Take by mouth daily        QUEtiapine (SEROquel) 50 mg tablet take 1 tablet by mouth once daily at bedtime 30 tablet 5    simvastatin (ZOCOR) 20 mg tablet Take 1 tablet (20 mg total) by mouth daily 90 tablet 0    spironolactone (ALDACTONE) 50 mg tablet take 1 tablet by mouth twice a day 180 tablet 4    trospium chloride (SANCTURA) 20 mg tablet take 1 tablet by mouth once daily 90 tablet 1     No current facility-administered medications for this visit  Allergies  Allergies   Allergen Reactions    Lisinopril Cough     Reaction Date: [id number];     Losartan Eye Swelling       Past medical history, social history, family history, medications and allergies were reviewed  Vitals  There were no vitals filed for this visit  Physical Exam  Physical Exam  Constitutional:       Appearance: Normal appearance  She is well-developed  She is obese  HENT:      Head: Normocephalic  Eyes:      Pupils: Pupils are equal, round, and reactive to light  Pulmonary:      Effort: Pulmonary effort is normal    Abdominal:      Palpations: Abdomen is soft  Musculoskeletal:         General: Normal range of motion  Cervical back: Normal range of motion  Skin:     General: Skin is warm and dry  Neurological:      General: No focal deficit present  Mental Status: She is alert and oriented to person, place, and time  Psychiatric:         Mood and Affect: Mood normal          Behavior: Behavior normal          Thought Content: Thought content normal          Judgment: Judgment normal          Results    I have personally reviewed all pertinent lab results and reviewed with patient  Lab Results   Component Value Date    GLUCOSE 119 (H) 08/24/2017    CALCIUM 9 8 08/24/2017     08/24/2017    K 4 2 04/09/2021    CO2 29 04/09/2021    CL 98 04/09/2021    BUN 15 04/09/2021    CREATININE 0 75 04/09/2021     Lab Results   Component Value Date    WBC 6 9 07/31/2018    HGB 14 5 07/31/2018    HCT 42 9 07/31/2018    MCV 93 07/31/2018     07/31/2018     No results found for this or any previous visit (from the past 1 hour(s))

## 2021-10-03 DIAGNOSIS — I10 ESSENTIAL HYPERTENSION: ICD-10-CM

## 2021-10-03 DIAGNOSIS — E78.5 HYPERLIPIDEMIA, UNSPECIFIED HYPERLIPIDEMIA TYPE: ICD-10-CM

## 2021-10-03 DIAGNOSIS — E11.9 TYPE 2 DIABETES MELLITUS WITHOUT COMPLICATION, WITHOUT LONG-TERM CURRENT USE OF INSULIN (HCC): ICD-10-CM

## 2021-10-03 PROCEDURE — 4010F ACE/ARB THERAPY RXD/TAKEN: CPT | Performed by: NURSE PRACTITIONER

## 2021-10-03 RX ORDER — SIMVASTATIN 20 MG
TABLET ORAL
Qty: 90 TABLET | Refills: 0 | Status: SHIPPED | OUTPATIENT
Start: 2021-10-03 | End: 2022-03-28

## 2021-10-03 RX ORDER — LOSARTAN POTASSIUM 100 MG/1
TABLET ORAL
Qty: 90 TABLET | Refills: 0 | Status: SHIPPED | OUTPATIENT
Start: 2021-10-03 | End: 2022-03-28

## 2021-11-05 DIAGNOSIS — F33.42 RECURRENT MAJOR DEPRESSIVE DISORDER, IN FULL REMISSION (HCC): ICD-10-CM

## 2021-11-05 RX ORDER — QUETIAPINE FUMARATE 50 MG/1
TABLET, FILM COATED ORAL
Qty: 30 TABLET | Refills: 5 | Status: SHIPPED | OUTPATIENT
Start: 2021-11-05 | End: 2022-05-31

## 2021-11-05 RX ORDER — FLUOXETINE HYDROCHLORIDE 40 MG/1
CAPSULE ORAL
Qty: 30 CAPSULE | Refills: 5 | Status: SHIPPED | OUTPATIENT
Start: 2021-11-05 | End: 2022-05-31

## 2021-11-06 LAB
ALBUMIN SERPL-MCNC: 4.1 G/DL (ref 3.8–4.9)
ALBUMIN/GLOB SERPL: 1.6 {RATIO} (ref 1.2–2.2)
ALP SERPL-CCNC: 61 IU/L (ref 44–121)
ALT SERPL-CCNC: 25 IU/L (ref 0–32)
AST SERPL-CCNC: 27 IU/L (ref 0–40)
BILIRUB SERPL-MCNC: 0.3 MG/DL (ref 0–1.2)
BUN SERPL-MCNC: 13 MG/DL (ref 6–24)
BUN/CREAT SERPL: 19 (ref 9–23)
CALCIUM SERPL-MCNC: 9.6 MG/DL (ref 8.7–10.2)
CHLORIDE SERPL-SCNC: 98 MMOL/L (ref 96–106)
CO2 SERPL-SCNC: 26 MMOL/L (ref 20–29)
CREAT SERPL-MCNC: 0.67 MG/DL (ref 0.57–1)
EST. AVERAGE GLUCOSE BLD GHB EST-MCNC: 171 MG/DL
GLOBULIN SER-MCNC: 2.5 G/DL (ref 1.5–4.5)
GLUCOSE SERPL-MCNC: 131 MG/DL (ref 65–99)
HBA1C MFR BLD: 7.6 % (ref 4.8–5.6)
HCV AB S/CO SERPL IA: <0.1 S/CO RATIO (ref 0–0.9)
POTASSIUM SERPL-SCNC: 4.4 MMOL/L (ref 3.5–5.2)
PROT SERPL-MCNC: 6.6 G/DL (ref 6–8.5)
SL AMB EGFR AFRICAN AMERICAN: 111 ML/MIN/1.73
SL AMB EGFR NON AFRICAN AMERICAN: 97 ML/MIN/1.73
SODIUM SERPL-SCNC: 137 MMOL/L (ref 134–144)
T4 FREE SERPL-MCNC: 1.45 NG/DL (ref 0.82–1.77)
TESTOST FREE SERPL-MCNC: 3.2 PG/ML (ref 0–4.2)
TESTOST SERPL-MCNC: 9 NG/DL (ref 4–50)
TSH SERPL DL<=0.005 MIU/L-ACNC: 3.43 UIU/ML (ref 0.45–4.5)

## 2021-11-06 PROCEDURE — 3051F HG A1C>EQUAL 7.0%<8.0%: CPT | Performed by: NURSE PRACTITIONER

## 2021-11-16 ENCOUNTER — TELEPHONE (OUTPATIENT)
Dept: ENDOCRINOLOGY | Facility: HOSPITAL | Age: 61
End: 2021-11-16

## 2021-11-16 ENCOUNTER — OFFICE VISIT (OUTPATIENT)
Dept: FAMILY MEDICINE CLINIC | Facility: HOSPITAL | Age: 61
End: 2021-11-16
Payer: COMMERCIAL

## 2021-11-16 VITALS
HEART RATE: 94 BPM | BODY MASS INDEX: 47.91 KG/M2 | WEIGHT: 280.6 LBS | SYSTOLIC BLOOD PRESSURE: 122 MMHG | TEMPERATURE: 97.5 F | DIASTOLIC BLOOD PRESSURE: 74 MMHG | HEIGHT: 64 IN

## 2021-11-16 DIAGNOSIS — Z12.11 SCREENING FOR COLON CANCER: ICD-10-CM

## 2021-11-16 DIAGNOSIS — E78.2 MIXED HYPERLIPIDEMIA: ICD-10-CM

## 2021-11-16 DIAGNOSIS — Z11.4 ENCOUNTER FOR SCREENING FOR HIV: ICD-10-CM

## 2021-11-16 DIAGNOSIS — E03.8 HYPOTHYROIDISM DUE TO HASHIMOTO'S THYROIDITIS: ICD-10-CM

## 2021-11-16 DIAGNOSIS — E66.01 MORBID OBESITY WITH BMI OF 45.0-49.9, ADULT (HCC): ICD-10-CM

## 2021-11-16 DIAGNOSIS — N32.81 OVERACTIVE BLADDER: ICD-10-CM

## 2021-11-16 DIAGNOSIS — Z12.31 ENCOUNTER FOR SCREENING MAMMOGRAM FOR BREAST CANCER: ICD-10-CM

## 2021-11-16 DIAGNOSIS — E06.3 HYPOTHYROIDISM DUE TO HASHIMOTO'S THYROIDITIS: ICD-10-CM

## 2021-11-16 DIAGNOSIS — E11.9 TYPE 2 DIABETES MELLITUS WITHOUT COMPLICATION, WITHOUT LONG-TERM CURRENT USE OF INSULIN (HCC): ICD-10-CM

## 2021-11-16 DIAGNOSIS — I10 PRIMARY HYPERTENSION: Primary | ICD-10-CM

## 2021-11-16 PROCEDURE — 99214 OFFICE O/P EST MOD 30 MIN: CPT | Performed by: NURSE PRACTITIONER

## 2021-11-16 PROCEDURE — 3008F BODY MASS INDEX DOCD: CPT | Performed by: NURSE PRACTITIONER

## 2021-11-16 PROCEDURE — 3074F SYST BP LT 130 MM HG: CPT | Performed by: NURSE PRACTITIONER

## 2021-11-16 PROCEDURE — 3078F DIAST BP <80 MM HG: CPT | Performed by: NURSE PRACTITIONER

## 2021-11-16 RX ORDER — IBUPROFEN 600 MG/1
600 TABLET ORAL EVERY 6 HOURS PRN
COMMUNITY
Start: 2021-10-06

## 2021-11-18 DIAGNOSIS — E03.8 HYPOTHYROIDISM DUE TO HASHIMOTO'S THYROIDITIS: ICD-10-CM

## 2021-11-18 DIAGNOSIS — E06.3 HYPOTHYROIDISM DUE TO HASHIMOTO'S THYROIDITIS: ICD-10-CM

## 2021-11-18 DIAGNOSIS — E11.9 TYPE 2 DIABETES MELLITUS WITHOUT COMPLICATION, WITHOUT LONG-TERM CURRENT USE OF INSULIN (HCC): Primary | ICD-10-CM

## 2021-11-18 DIAGNOSIS — E28.2 POLYCYSTIC OVARIAN SYNDROME: ICD-10-CM

## 2021-11-18 DIAGNOSIS — E11.42 DIABETIC POLYNEUROPATHY ASSOCIATED WITH TYPE 2 DIABETES MELLITUS (HCC): ICD-10-CM

## 2021-12-01 ENCOUNTER — TELEPHONE (OUTPATIENT)
Dept: FAMILY MEDICINE CLINIC | Facility: HOSPITAL | Age: 61
End: 2021-12-01

## 2021-12-01 DIAGNOSIS — L68.0 HIRSUTISM: ICD-10-CM

## 2021-12-01 DIAGNOSIS — E28.2 PCOS (POLYCYSTIC OVARIAN SYNDROME): ICD-10-CM

## 2021-12-02 DIAGNOSIS — L68.0 HIRSUTISM: ICD-10-CM

## 2021-12-02 DIAGNOSIS — E28.2 PCOS (POLYCYSTIC OVARIAN SYNDROME): ICD-10-CM

## 2021-12-02 RX ORDER — SPIRONOLACTONE 50 MG/1
50 TABLET, FILM COATED ORAL 2 TIMES DAILY
Qty: 180 TABLET | Refills: 2 | Status: SHIPPED | OUTPATIENT
Start: 2021-12-02

## 2022-01-03 DIAGNOSIS — E11.9 TYPE 2 DIABETES MELLITUS WITHOUT COMPLICATION, WITHOUT LONG-TERM CURRENT USE OF INSULIN (HCC): ICD-10-CM

## 2022-01-04 DIAGNOSIS — E03.9 ACQUIRED HYPOTHYROIDISM: ICD-10-CM

## 2022-01-05 RX ORDER — LEVOTHYROXINE SODIUM 112 UG/1
TABLET ORAL
Qty: 30 TABLET | Refills: 5 | Status: SHIPPED | OUTPATIENT
Start: 2022-01-05 | End: 2022-07-03

## 2022-01-15 NOTE — PROGRESS NOTES
1/19/2022    Monika Flanagan  1960  [id number]        Assessment  -Overactive bladder  -Urinary urge and stress incontinence    Discussion/Plan  Loli Helms is a 62 y o  female being managed by our office    1  Overactive bladder, urinary urge and stress incontinence- PVR in the office today is 45 mL  We had a long discussion regarding her ongoing lower urinary tract symptoms  I recommend referral to pelvic floor physical therapy for management of urinary stress incontinence  This is her primary symptom  We also discussed discontinuing Myrbetriq and trialing Detrol again as she does state this was effective years ago  Prescription was electronically sent to her pharmacy  Reviewed dietary, behavioral modifications, and Kegel exercises  We also discussed tertiary treatment such as PTNS therapy, bladder Botox injections, and InterStim device  Patient would like to avoid any invasive testing  Plan to follow-up in 4 months for re-evaluation of her urinary pattern PVR assessment  She was instructed to call sooner with any issues     -All questions answered, patients agree with plan     History of Present Illness  62 y o  female with a history of OAB and urinary urge incontinence presents today for follow up  Patient recently seen in consultation in September 2021  Patient started on Myrbetriq 25mg daily for management of her urinary urgency and urge incontinence  She continues to report episodes of urinary frequency, urgency, and stress incontinence despite medication management  Her primary symptom is leakage of urine upon standing and changing positions  She wear sanitary pads daily for protection  She denies any episodes of gross hematuria, dysuria, or flank pain  Patient states she had previously taken Detrol several years ago and found medication to be effective  No changes to her overall health  Review of Systems  Review of Systems   Constitutional: Negative  HENT: Negative      Respiratory: Negative  Cardiovascular: Negative  Gastrointestinal: Negative  Genitourinary: Positive for frequency and urgency  Negative for decreased urine volume, difficulty urinating, dysuria, flank pain and hematuria  Musculoskeletal: Negative  Skin: Negative  Neurological: Negative  Psychiatric/Behavioral: Negative          Past Medical History  Past Medical History:   Diagnosis Date    Anxiety     Arthritis     last assessed: June 4, 2012    Depression     Diabetes [facility])     mellitus - Resolved: Nov 18, 2015    Hashimoto's thyroiditis        Past Social History  Past Surgical History:   Procedure Laterality Date    CATARACT EXTRACTION Left     FOOT SURGERY Left     plantars wart removed    TONSILLECTOMY         Past Family History  Family History   Problem Relation Age of Onset    Diabetes Mother         mellitus     Diabetes type II Mother     Mental illness Mother     Esophageal cancer Father     No Known Problems Brother     No Known Problems Brother     No Known Problems Brother        Past Social history  Social History     Socioeconomic History    Marital status:      Spouse name: Not on file    Number of children: Not on file    Years of education: Not on file    Highest education level: Not on file   Occupational History    Not on file   Tobacco Use    Smoking status: Never Smoker    Smokeless tobacco: Never Used   Vaping Use    Vaping Use: Never used   Substance and Sexual Activity    Alcohol use: No    Drug use: No    Sexual activity: Not on file   Other Topics Concern    Not on file   Social History Narrative    Lives alone without help available     Martial history:      Travels by public transportation      Social Determinants of Health     Financial Resource Strain: Not on file   Food Insecurity: Not on file   Transportation Needs: Not on file   Physical Activity: Not on file   Stress: Not on file   Social Connections: Not on file   Intimate Partner Violence: Not on file   Housing Stability: Not on file       Current Medications  Current Outpatient Medications   Medication Sig Dispense Refill    betamethasone dipropionate (DIPROSONE) 0 05 % cream apply topically to affected area twice a day 45 g 0    cholecalciferol (VITAMIN D3) 1,000 units tablet Take by mouth daily        finasteride (PROPECIA) 1 MG tablet Take 1 tablet (1 mg total) by mouth daily 30 tablet 5    FLUoxetine (PROzac) 40 MG capsule take 1 capsule by mouth once daily 30 capsule 5    ibuprofen (MOTRIN) 600 mg tablet Take 600 mg by mouth every 6 (six) hours as needed      levothyroxine 112 mcg tablet take 1 tablet by mouth once daily 30 tablet 5    losartan (COZAAR) 100 MG tablet take 1 tablet by mouth once daily 90 tablet 0    metFORMIN (GLUCOPHAGE) 500 mg tablet Take 2 tablets (1,000 mg total) by mouth 2 (two) times a day with meals 360 tablet 1    Mirabegron ER 25 MG TB24 Take 25 mg by mouth daily 30 tablet 3    mometasone (NASONEX) 50 mcg/act nasal spray Use 2 sprays in each nostril at bedtime as needed 1 Act 2    MULTIPLE VITAMIN PO Take by mouth daily        QUEtiapine (SEROquel) 50 mg tablet take 1 tablet by mouth once daily at bedtime 30 tablet 5    simvastatin (ZOCOR) 20 mg tablet take 1 tablet by mouth once daily 90 tablet 0    spironolactone (ALDACTONE) 50 mg tablet Take 1 tablet (50 mg total) by mouth 2 (two) times a day 180 tablet 2     No current facility-administered medications for this visit  Allergies  Allergies   Allergen Reactions    Lisinopril Cough     Reaction Date: [id number];     Losartan Eye Swelling       Past medical history, social history, family history, medications and allergies were reviewed  Vitals  There were no vitals filed for this visit  Physical Exam  Physical Exam  Constitutional:       Appearance: Normal appearance  She is well-developed  She is obese  HENT:      Head: Normocephalic     Eyes:      Pupils: Pupils are equal, round, and reactive to light  Pulmonary:      Effort: Pulmonary effort is normal    Abdominal:      Palpations: Abdomen is soft  Musculoskeletal:         General: Normal range of motion  Cervical back: Normal range of motion  Skin:     General: Skin is warm and dry  Neurological:      General: No focal deficit present  Mental Status: She is alert and oriented to person, place, and time  Psychiatric:         Mood and Affect: Mood normal          Behavior: Behavior normal          Thought Content: Thought content normal          Judgment: Judgment normal          Results    I have personally reviewed all pertinent lab results and reviewed with patient  Lab Results   Component Value Date    GLUCOSE 119 (H) 08/24/2017    CALCIUM 9 8 08/24/2017     08/24/2017    K 4 4 11/05/2021    CO2 26 11/05/2021    CL 98 11/05/2021    BUN 13 11/05/2021    CREATININE 0 67 11/05/2021     Lab Results   Component Value Date    WBC 6 9 07/31/2018    HGB 14 5 07/31/2018    HCT 42 9 07/31/2018    MCV 93 07/31/2018     07/31/2018     No results found for this or any previous visit (from the past 1 hour(s))

## 2022-01-19 ENCOUNTER — OFFICE VISIT (OUTPATIENT)
Dept: UROLOGY | Facility: HOSPITAL | Age: 62
End: 2022-01-19
Payer: COMMERCIAL

## 2022-01-19 VITALS
SYSTOLIC BLOOD PRESSURE: 122 MMHG | BODY MASS INDEX: 47.97 KG/M2 | OXYGEN SATURATION: 95 % | HEART RATE: 105 BPM | WEIGHT: 281 LBS | DIASTOLIC BLOOD PRESSURE: 78 MMHG | HEIGHT: 64 IN

## 2022-01-19 DIAGNOSIS — N32.81 OVERACTIVE BLADDER: Primary | ICD-10-CM

## 2022-01-19 DIAGNOSIS — N39.3 STRESS INCONTINENCE: ICD-10-CM

## 2022-01-19 LAB — POST-VOID RESIDUAL VOLUME, ML POC: 45 ML

## 2022-01-19 PROCEDURE — 51798 US URINE CAPACITY MEASURE: CPT | Performed by: NURSE PRACTITIONER

## 2022-01-19 PROCEDURE — 3078F DIAST BP <80 MM HG: CPT | Performed by: NURSE PRACTITIONER

## 2022-01-19 PROCEDURE — 3074F SYST BP LT 130 MM HG: CPT | Performed by: NURSE PRACTITIONER

## 2022-01-19 PROCEDURE — 3008F BODY MASS INDEX DOCD: CPT | Performed by: NURSE PRACTITIONER

## 2022-01-19 PROCEDURE — 99213 OFFICE O/P EST LOW 20 MIN: CPT | Performed by: NURSE PRACTITIONER

## 2022-01-19 RX ORDER — TOLTERODINE 4 MG/1
4 CAPSULE, EXTENDED RELEASE ORAL DAILY
Qty: 30 CAPSULE | Refills: 3 | Status: SHIPPED | OUTPATIENT
Start: 2022-01-19 | End: 2022-05-18 | Stop reason: SDUPTHER

## 2022-01-19 NOTE — PATIENT INSTRUCTIONS
Kegel Exercises for Women   AMBULATORY CARE:   Kegel exercises  help strengthen your pelvic muscles  Pelvic muscles hold your pelvic organs, such as your bladder and uterus, in place  Kegel exercises help prevent or control problems with urine incontinence (leakage)  Incontinence may be caused by pregnancy, childbirth, or menopause  Contact your healthcare provider if:   · You cannot feel your muscles tighten or relax  · You continue to leak urine  · You have questions or concerns about your condition or care  Use the correct muscles:  Pelvic muscles are the muscles you use to control urine flow  To target these muscles, stop and start the flow of urine several times  This will help you become familiar with how it feels to tighten and relax these muscles  How to do Kegel exercises:   · Empty your bladder  You may lie down, stand up, or sit down to do these exercises  When you first try to do these exercises, it may be easier if you lie down  Tighten or squeeze your pelvic muscles slowly  It may feel like you are trying to hold back urine or gas  Hold this position for 3 seconds  Relax for 3 seconds  Repeat this cycle 10 times  · Do 10 sets of Kegel exercises, at least 3 times a day  Do not hold your breath when you do Kegel exercises  Keep your stomach, back, and leg muscles relaxed  · As your muscles get stronger, you will be able to hold the squeeze longer  Your healthcare provider may ask that you increase your pelvic muscle squeeze to 10 seconds  After you squeeze for 10 seconds, relax for 10 seconds  What else you should know:   · Once you know how to do Kegel exercises, use different positions  You can do these exercises while you lie on the floor, sit at your desk or watch TV, and while you stand  · You may notice improved bladder control within about 6 weeks  · Tighten your pelvic muscles before you sneeze, cough, or lift to prevent urine leakage      Follow up with your doctor as directed:  Write down your questions so you remember to ask them during your visits  © Copyright [employer] 2021 Information is for End User's use only and may not be sold, redistributed or otherwise used for commercial purposes  All illustrations and images included in CareNotes® are the copyrighted property of A D A M , Inc  or Monie Pablo  The above information is an  only  It is not intended as medical advice for individual conditions or treatments  Talk to your doctor, nurse or pharmacist before following any medical regimen to see if it is safe and effective for you  BLADDER HEALTH    WHAT IS CONSIDERED NORMAL?  The average bladder can hold about 2 cups of urine before it needs to be emptied   The normal range of voiding urine is 6 to 8 times during a 24 hour period  As we get older, our bladder capacity can get smaller and we may need to pass urine more frequently but usually not more than every 2 hours   Urine should flow easily without discomfort in a good, steady stream until the bladder is empty  No pushing or straining is necessary to empty the bladder   An urge is a signal that you feel as the bladder stretches to fill with urine  Urges can be felt even if the bladder is not full  Urges are not commands to go to the toilet, merely a signal and can be controlled  WHAT ARE GOOD BLADDER HABITS?  Take your time when emptying your bladder  Dont strain or push to empty your bladder  Make sure you empty your bladder completely each time you pass urine  Do not rush the process   Consistently ignoring the urge to go (waiting more than 4 hours between toileting) or urinating too infrequently may be convenient but not healthy for your bladder   Avoid going to the toilet just in case or more often than every 2 hours  It is usually not necessary to go when you feel the first urge  Try to go only when your bladder is full   Urgency and frequency of urination can be improved by retraining the bladder and spacing your fluid intake throughout the day  Practice good toilet habits  Dont let your bladder control your life  TIPS TO MAINTAIN GOOD BLADDER HABITS   Maintain a good fluid intake  Depending on your body size and environment, drink 6 -8 cups (8 oz each) of fluid per day unless otherwise advised by your doctor  Not enough fluid creates a foul odor and dark color of the urine   Limit the amount of caffeine (coffee, cola, chocolate or tea) and citrus foods that you consume as these foods can be associated with increased sensation of urinary urgency and frequency   Limit the amount of alcohol you drink  Alcohol increases urine production and makes it difficult for the brain to coordinate bladder control   Avoid constipation by maintaining a balanced diet of dietary fiber   Cigarette smoking is also irritating to the bladder surface and is associated with bladder cancer  In addition, the coughing associated with smoking may lead to increased incontinent episodes because of the increased pressure  HOW DIET CAN AFFECT YOUR BLADDER  Although there is no particular "diet" that can cure bladder control, there are certain dietary suggestions you can use to help control the problem  There are 2 points to consider when evaluating how your habits and diet may affect your bladder:    1  Foods and fluids can irritate the bladder  Some foods and beverages are thought to contribute to bladder leakage and irritability  However their effect on the bladder is not completely understood and you may want to see if eliminating one or all of these items improves your bladder control      If you are unable to give them up completely, it is recommended that you use the following items in moderation:   Acidic beverages and foods (orange juice, grapefruit juice, lemonade etc)   Alcoholic beverages   Vinegar   Coffee (regular and decaf)   Tea (regular and decaf)   Caffeinated beverages   Carbonated beverages          2  Drinking enough and the right kinds of fluids  Many people with bladder control issues decrease their intake of liquids in hope that they will need to urinate less frequently or have less urinary leakage   You should not restrict fluids to control your bladder  While a decrease in liquid intake does result in a decrease in the volume of urine, the smaller amount of urine may be more highly concentrated   Highly concentrated, dark yellow urine is irritating to the bladder surface and may actually cause you to go to the bathroom more frequently   It also encourages the growth of bacteria, which may lead to infections resulting in incontinence   Substitutions for Bladder Irritants: water is always the best beverage choice    Grape and apple juice are thirst quenchers are good selections and are not as irritating to the bladder   o Low acid fruits:  Pears, apricots, papaya, watermelon  o For coffee drinkers: KAVA®, Postum®, Jama®, Kaffree Palisades®  o For tea drinkers:  non-citrus or herbal and sun brewed tea

## 2022-02-26 LAB — HEMOCCULT STL QL IA: NEGATIVE

## 2022-03-28 DIAGNOSIS — I10 ESSENTIAL HYPERTENSION: ICD-10-CM

## 2022-03-28 DIAGNOSIS — E78.5 HYPERLIPIDEMIA, UNSPECIFIED HYPERLIPIDEMIA TYPE: ICD-10-CM

## 2022-03-28 RX ORDER — LOSARTAN POTASSIUM 100 MG/1
TABLET ORAL
Qty: 90 TABLET | Refills: 0 | Status: SHIPPED | OUTPATIENT
Start: 2022-03-28 | End: 2022-05-31

## 2022-03-28 RX ORDER — SIMVASTATIN 20 MG
TABLET ORAL
Qty: 90 TABLET | Refills: 0 | Status: SHIPPED | OUTPATIENT
Start: 2022-03-28 | End: 2022-05-31

## 2022-04-01 DIAGNOSIS — E11.9 TYPE 2 DIABETES MELLITUS WITHOUT COMPLICATION, WITHOUT LONG-TERM CURRENT USE OF INSULIN (HCC): ICD-10-CM

## 2022-05-14 LAB
ALBUMIN SERPL-MCNC: 4.3 G/DL (ref 3.8–4.9)
ALBUMIN/CREAT UR: 8 MG/G CREAT (ref 0–29)
ALBUMIN/GLOB SERPL: 1.8 {RATIO} (ref 1.2–2.2)
ALP SERPL-CCNC: 56 IU/L (ref 44–121)
ALT SERPL-CCNC: 26 IU/L (ref 0–32)
AST SERPL-CCNC: 29 IU/L (ref 0–40)
BASOPHILS # BLD AUTO: 0 X10E3/UL (ref 0–0.2)
BASOPHILS # BLD AUTO: 0 X10E3/UL (ref 0–0.2)
BASOPHILS NFR BLD AUTO: 0 %
BASOPHILS NFR BLD AUTO: 0 %
BILIRUB SERPL-MCNC: 0.3 MG/DL (ref 0–1.2)
BUN SERPL-MCNC: 13 MG/DL (ref 8–27)
BUN/CREAT SERPL: 19 (ref 12–28)
CALCIUM SERPL-MCNC: 9.6 MG/DL (ref 8.7–10.3)
CHLORIDE SERPL-SCNC: 98 MMOL/L (ref 96–106)
CHOLEST SERPL-MCNC: 135 MG/DL (ref 100–199)
CHOLEST/HDLC SERPL: 3.6 RATIO (ref 0–4.4)
CO2 SERPL-SCNC: 22 MMOL/L (ref 20–29)
CREAT SERPL-MCNC: 0.7 MG/DL (ref 0.57–1)
CREAT UR-MCNC: 40.8 MG/DL
EGFR: 99 ML/MIN/1.73
EOSINOPHIL # BLD AUTO: 0.2 X10E3/UL (ref 0–0.4)
EOSINOPHIL # BLD AUTO: 0.2 X10E3/UL (ref 0–0.4)
EOSINOPHIL NFR BLD AUTO: 2 %
EOSINOPHIL NFR BLD AUTO: 3 %
ERYTHROCYTE [DISTWIDTH] IN BLOOD BY AUTOMATED COUNT: 12.2 % (ref 11.7–15.4)
ERYTHROCYTE [DISTWIDTH] IN BLOOD BY AUTOMATED COUNT: 12.4 % (ref 11.7–15.4)
EST. AVERAGE GLUCOSE BLD GHB EST-MCNC: 160 MG/DL
GLOBULIN SER-MCNC: 2.4 G/DL (ref 1.5–4.5)
GLUCOSE SERPL-MCNC: 121 MG/DL (ref 65–99)
HBA1C MFR BLD: 7.2 % (ref 4.8–5.6)
HCT VFR BLD AUTO: 44.3 % (ref 34–46.6)
HCT VFR BLD AUTO: 44.4 % (ref 34–46.6)
HDLC SERPL-MCNC: 37 MG/DL
HGB BLD-MCNC: 14.8 G/DL (ref 11.1–15.9)
HGB BLD-MCNC: 15 G/DL (ref 11.1–15.9)
HIV 1+2 AB+HIV1 P24 AG SERPL QL IA: NON REACTIVE
IMM GRANULOCYTES # BLD: 0 X10E3/UL (ref 0–0.1)
IMM GRANULOCYTES # BLD: 0 X10E3/UL (ref 0–0.1)
IMM GRANULOCYTES NFR BLD: 0 %
IMM GRANULOCYTES NFR BLD: 0 %
LDLC SERPL CALC-MCNC: 69 MG/DL (ref 0–99)
LYMPHOCYTES # BLD AUTO: 2.9 X10E3/UL (ref 0.7–3.1)
LYMPHOCYTES # BLD AUTO: 2.9 X10E3/UL (ref 0.7–3.1)
LYMPHOCYTES NFR BLD AUTO: 39 %
LYMPHOCYTES NFR BLD AUTO: 41 %
MCH RBC QN AUTO: 31.3 PG (ref 26.6–33)
MCH RBC QN AUTO: 31.6 PG (ref 26.6–33)
MCHC RBC AUTO-ENTMCNC: 33.3 G/DL (ref 31.5–35.7)
MCHC RBC AUTO-ENTMCNC: 33.9 G/DL (ref 31.5–35.7)
MCV RBC AUTO: 94 FL (ref 79–97)
MCV RBC AUTO: 94 FL (ref 79–97)
MICROALBUMIN UR-MCNC: 3.1 UG/ML
MONOCYTES # BLD AUTO: 0.6 X10E3/UL (ref 0.1–0.9)
MONOCYTES # BLD AUTO: 0.7 X10E3/UL (ref 0.1–0.9)
MONOCYTES NFR BLD AUTO: 10 %
MONOCYTES NFR BLD AUTO: 9 %
NEUTROPHILS # BLD AUTO: 3.3 X10E3/UL (ref 1.4–7)
NEUTROPHILS # BLD AUTO: 3.5 X10E3/UL (ref 1.4–7)
NEUTROPHILS NFR BLD AUTO: 48 %
NEUTROPHILS NFR BLD AUTO: 48 %
PLATELET # BLD AUTO: 216 X10E3/UL (ref 150–450)
PLATELET # BLD AUTO: 233 X10E3/UL (ref 150–450)
POTASSIUM SERPL-SCNC: 4.6 MMOL/L (ref 3.5–5.2)
PROT SERPL-MCNC: 6.7 G/DL (ref 6–8.5)
RBC # BLD AUTO: 4.73 X10E6/UL (ref 3.77–5.28)
RBC # BLD AUTO: 4.74 X10E6/UL (ref 3.77–5.28)
SL AMB VLDL CHOLESTEROL CALC: 29 MG/DL (ref 5–40)
SODIUM SERPL-SCNC: 138 MMOL/L (ref 134–144)
T4 FREE SERPL-MCNC: 1.49 NG/DL (ref 0.82–1.77)
TRIGL SERPL-MCNC: 169 MG/DL (ref 0–149)
TSH SERPL DL<=0.005 MIU/L-ACNC: 1.94 UIU/ML (ref 0.45–4.5)
WBC # BLD AUTO: 7.1 X10E3/UL (ref 3.4–10.8)
WBC # BLD AUTO: 7.3 X10E3/UL (ref 3.4–10.8)

## 2022-05-14 PROCEDURE — 3051F HG A1C>EQUAL 7.0%<8.0%: CPT | Performed by: NURSE PRACTITIONER

## 2022-05-14 PROCEDURE — 3061F NEG MICROALBUMINURIA REV: CPT | Performed by: NURSE PRACTITIONER

## 2022-05-16 NOTE — PROGRESS NOTES
5/18/2022    Monika Flanagan  1960  [id number]        Assessment  -Overactive bladder  -Mixed urinary incontinence    Discussion/Plan  Gagan Reddy is a 62 y o  female being managed by our office    1  Overactive bladder, mixed urinary incontinence- PVR in office today is 0 mL  Patient reports improvement in urinary symptoms since restarting Detrol  She will continue to take daily  Prescription refill was electronically sent to her pharmacy  We had a long discussion regarding dietary and behavior modifications  Unfortunately, she is unable to go to pelvic floor physical therapy due to transportation, but will consider this option in the future  Plan to follow-up in 1 year for re-evaluation  She was advised to call sooner with any issues     -All questions answered, patients agree with plan     History of Present Illness  62 y o  female with a history of OAB and mixed urinary incontinence presents today for follow up  Patient last seen in the office in January 2022  She was switched from Myrbetriq to Detrol due to increased lower urinary tract symptoms  Patient was also referred to pelvic floor physical therapy for management of urinary stress incontinence  She presents today for follow-up  Patient reports improvement in her urinary symptoms  She still continues to experience episodes of urinary stress incontinence  Unfortunately, she is unable to go to pelvic floor physical therapy as she does not own a car  Patient is limited with transportation  She denies any gross hematuria or dysuria  No changes in her overall health  Review of Systems  Review of Systems   Constitutional: Negative  HENT: Negative  Respiratory: Negative  Cardiovascular: Negative  Gastrointestinal: Negative  Genitourinary: Positive for urgency  Negative for decreased urine volume, difficulty urinating, dysuria, flank pain, frequency and hematuria  Musculoskeletal: Negative  Skin: Negative      Neurological: Negative  Psychiatric/Behavioral: Negative          Past Medical History  Past Medical History:   Diagnosis Date    Anxiety     Arthritis     last assessed: June 4, 2012    Depression     Diabetes [facility])     mellitus - Resolved: Nov 18, 2015    Hashimoto's thyroiditis        Past Social History  Past Surgical History:   Procedure Laterality Date    CATARACT EXTRACTION Left     FOOT SURGERY Left     plantars wart removed    TONSILLECTOMY         Past Family History  Family History   Problem Relation Age of Onset    Diabetes Mother         mellitus     Diabetes type II Mother     Mental illness Mother     Esophageal cancer Father     No Known Problems Brother     No Known Problems Brother     No Known Problems Brother        Past Social history  Social History     Socioeconomic History    Marital status:      Spouse name: Not on file    Number of children: Not on file    Years of education: Not on file    Highest education level: Not on file   Occupational History    Not on file   Tobacco Use    Smoking status: Never Smoker    Smokeless tobacco: Never Used   Vaping Use    Vaping Use: Never used   Substance and Sexual Activity    Alcohol use: No    Drug use: No    Sexual activity: Not on file   Other Topics Concern    Not on file   Social History Narrative    Lives alone without help available     Martial history:      Travels by public transportation      Social Determinants of Health     Financial Resource Strain: Not on file   Food Insecurity: Not on file   Transportation Needs: Not on file   Physical Activity: Not on file   Stress: Not on file   Social Connections: Not on file   Intimate Partner Violence: Not on file   Housing Stability: Not on file       Current Medications  Current Outpatient Medications   Medication Sig Dispense Refill    betamethasone dipropionate (DIPROSONE) 0 05 % cream apply topically to affected area twice a day 45 g 0    cholecalciferol (VITAMIN D3) 1,000 units tablet Take by mouth daily        finasteride (PROPECIA) 1 MG tablet Take 1 tablet (1 mg total) by mouth daily 30 tablet 5    FLUoxetine (PROzac) 40 MG capsule take 1 capsule by mouth once daily 30 capsule 5    ibuprofen (MOTRIN) 600 mg tablet Take 600 mg by mouth every 6 (six) hours as needed      levothyroxine 112 mcg tablet take 1 tablet by mouth once daily 30 tablet 5    losartan (COZAAR) 100 MG tablet take 1 tablet by mouth once daily 90 tablet 0    metFORMIN (GLUCOPHAGE) 500 mg tablet take 2 tablets (1,000 MG TOTAL) by mouth twice a day with meals 360 tablet 1    mometasone (NASONEX) 50 mcg/act nasal spray Use 2 sprays in each nostril at bedtime as needed 1 Act 2    MULTIPLE VITAMIN PO Take by mouth daily        QUEtiapine (SEROquel) 50 mg tablet take 1 tablet by mouth once daily at bedtime 30 tablet 5    simvastatin (ZOCOR) 20 mg tablet take 1 tablet by mouth once daily 90 tablet 0    spironolactone (ALDACTONE) 50 mg tablet Take 1 tablet (50 mg total) by mouth 2 (two) times a day 180 tablet 2    tolterodine (DETROL LA) 4 mg 24 hr capsule Take 1 capsule (4 mg total) by mouth in the morning  90 capsule 3     No current facility-administered medications for this visit  Allergies  Allergies   Allergen Reactions    Lisinopril Cough     Reaction Date: [id number];     Losartan Eye Swelling       Past medical history, social history, family history, medications and allergies were reviewed  Vitals  Vitals:    05/18/22 1135   BP: 118/78   BP Location: Left arm   Patient Position: Sitting   Cuff Size: Adult   Pulse: 90   SpO2: 96%   Weight: 127 kg (281 lb)   Height: 5' 4" (1 626 m)       Physical Exam  Physical Exam  Constitutional:       Appearance: Normal appearance  She is well-developed  She is obese  HENT:      Head: Normocephalic  Eyes:      Pupils: Pupils are equal, round, and reactive to light     Pulmonary:      Effort: Pulmonary effort is normal    Abdominal: Palpations: Abdomen is soft  Musculoskeletal:         General: Normal range of motion  Cervical back: Normal range of motion  Skin:     General: Skin is warm and dry  Neurological:      General: No focal deficit present  Mental Status: She is alert and oriented to person, place, and time  Psychiatric:         Mood and Affect: Mood normal          Behavior: Behavior normal          Thought Content:  Thought content normal          Judgment: Judgment normal          Results    I have personally reviewed all pertinent lab results and reviewed with patient  Lab Results   Component Value Date    GLUCOSE 119 (H) 08/24/2017    CALCIUM 9 8 08/24/2017     08/24/2017    K 4 6 05/13/2022    CO2 22 05/13/2022    CL 98 05/13/2022    BUN 13 05/13/2022    CREATININE 0 70 05/13/2022     Lab Results   Component Value Date    WBC 7 1 05/13/2022    WBC 7 3 05/13/2022    HGB 14 8 05/13/2022    HGB 15 0 05/13/2022    HCT 44 4 05/13/2022    HCT 44 3 05/13/2022    MCV 94 05/13/2022    MCV 94 05/13/2022     05/13/2022     05/13/2022     Recent Results (from the past 1 hour(s))   POCT Measure PVR    Collection Time: 05/18/22 11:34 AM   Result Value Ref Range    POST-VOID RESIDUAL VOLUME, ML POC 0 mL

## 2022-05-18 ENCOUNTER — OFFICE VISIT (OUTPATIENT)
Dept: UROLOGY | Facility: HOSPITAL | Age: 62
End: 2022-05-18
Payer: COMMERCIAL

## 2022-05-18 VITALS
DIASTOLIC BLOOD PRESSURE: 78 MMHG | WEIGHT: 281 LBS | OXYGEN SATURATION: 96 % | HEART RATE: 90 BPM | SYSTOLIC BLOOD PRESSURE: 118 MMHG | BODY MASS INDEX: 47.97 KG/M2 | HEIGHT: 64 IN

## 2022-05-18 DIAGNOSIS — N32.81 OVERACTIVE BLADDER: ICD-10-CM

## 2022-05-18 DIAGNOSIS — N32.81 OAB (OVERACTIVE BLADDER): Primary | ICD-10-CM

## 2022-05-18 LAB — POST-VOID RESIDUAL VOLUME, ML POC: 0 ML

## 2022-05-18 PROCEDURE — 3008F BODY MASS INDEX DOCD: CPT | Performed by: NURSE PRACTITIONER

## 2022-05-18 PROCEDURE — 1036F TOBACCO NON-USER: CPT | Performed by: NURSE PRACTITIONER

## 2022-05-18 PROCEDURE — 51798 US URINE CAPACITY MEASURE: CPT | Performed by: NURSE PRACTITIONER

## 2022-05-18 PROCEDURE — 99213 OFFICE O/P EST LOW 20 MIN: CPT | Performed by: NURSE PRACTITIONER

## 2022-05-18 RX ORDER — TOLTERODINE 4 MG/1
4 CAPSULE, EXTENDED RELEASE ORAL DAILY
Qty: 90 CAPSULE | Refills: 3 | Status: SHIPPED | OUTPATIENT
Start: 2022-05-18

## 2022-05-31 ENCOUNTER — TELEPHONE (OUTPATIENT)
Dept: FAMILY MEDICINE CLINIC | Facility: HOSPITAL | Age: 62
End: 2022-05-31

## 2022-05-31 DIAGNOSIS — F33.42 RECURRENT MAJOR DEPRESSIVE DISORDER, IN FULL REMISSION (HCC): ICD-10-CM

## 2022-05-31 PROCEDURE — 4010F ACE/ARB THERAPY RXD/TAKEN: CPT | Performed by: NURSE PRACTITIONER

## 2022-05-31 RX ORDER — FLUOXETINE HYDROCHLORIDE 40 MG/1
40 CAPSULE ORAL DAILY
Qty: 30 CAPSULE | Refills: 5 | Status: CANCELLED | OUTPATIENT
Start: 2022-05-31

## 2022-05-31 RX ORDER — QUETIAPINE FUMARATE 50 MG/1
50 TABLET, FILM COATED ORAL
Qty: 30 TABLET | Refills: 5 | Status: CANCELLED | OUTPATIENT
Start: 2022-05-31

## 2022-05-31 NOTE — TELEPHONE ENCOUNTER
Spoke to pt, reported she is going to call back to reschedule to schedule her follow up appointment that she had to cancel last time

## 2022-06-16 ENCOUNTER — ANNUAL EXAM (OUTPATIENT)
Dept: FAMILY MEDICINE CLINIC | Facility: HOSPITAL | Age: 62
End: 2022-06-16
Payer: COMMERCIAL

## 2022-06-16 VITALS
DIASTOLIC BLOOD PRESSURE: 78 MMHG | TEMPERATURE: 97.5 F | BODY MASS INDEX: 47.29 KG/M2 | OXYGEN SATURATION: 95 % | HEART RATE: 93 BPM | SYSTOLIC BLOOD PRESSURE: 108 MMHG | HEIGHT: 64 IN | WEIGHT: 277 LBS

## 2022-06-16 DIAGNOSIS — Z01.419 ENCOUNTER FOR GYNECOLOGICAL EXAMINATION: ICD-10-CM

## 2022-06-16 DIAGNOSIS — Z01.419 ENCOUNTER FOR GYNECOLOGICAL EXAMINATION WITHOUT ABNORMAL FINDING: Primary | ICD-10-CM

## 2022-06-16 DIAGNOSIS — E11.42 DIABETIC POLYNEUROPATHY ASSOCIATED WITH TYPE 2 DIABETES MELLITUS (HCC): ICD-10-CM

## 2022-06-16 DIAGNOSIS — N32.81 OVERACTIVE BLADDER: ICD-10-CM

## 2022-06-16 DIAGNOSIS — E11.9 TYPE 2 DIABETES MELLITUS WITHOUT COMPLICATION, WITHOUT LONG-TERM CURRENT USE OF INSULIN (HCC): Primary | ICD-10-CM

## 2022-06-16 DIAGNOSIS — Z13.820 SCREENING FOR OSTEOPOROSIS: ICD-10-CM

## 2022-06-16 DIAGNOSIS — E28.2 POLYCYSTIC OVARIAN SYNDROME: ICD-10-CM

## 2022-06-16 DIAGNOSIS — R93.5 ABNORMAL ENDOMETRIAL ULTRASOUND: ICD-10-CM

## 2022-06-16 LAB
LEFT EYE DIABETIC RETINOPATHY: ABNORMAL
LEFT EYE IMAGE QUALITY: ABNORMAL
LEFT EYE MACULAR EDEMA: ABNORMAL
LEFT EYE OTHER RETINOPATHY: ABNORMAL
RIGHT EYE DIABETIC RETINOPATHY: ABNORMAL
RIGHT EYE IMAGE QUALITY: ABNORMAL
RIGHT EYE MACULAR EDEMA: ABNORMAL
RIGHT EYE OTHER RETINOPATHY: ABNORMAL
SEVERITY (EYE EXAM): ABNORMAL

## 2022-06-16 PROCEDURE — 99215 OFFICE O/P EST HI 40 MIN: CPT | Performed by: NURSE PRACTITIONER

## 2022-06-16 PROCEDURE — 92250 FUNDUS PHOTOGRAPHY W/I&R: CPT | Performed by: NURSE PRACTITIONER

## 2022-06-16 PROCEDURE — 1036F TOBACCO NON-USER: CPT | Performed by: NURSE PRACTITIONER

## 2022-06-16 PROCEDURE — 2024F 7 FLD RTA PHOTO EVC RTNOPTHY: CPT | Performed by: NURSE PRACTITIONER

## 2022-06-16 PROCEDURE — 3008F BODY MASS INDEX DOCD: CPT | Performed by: NURSE PRACTITIONER

## 2022-06-16 NOTE — ASSESSMENT & PLAN NOTE
Managed by endocrine  Follow up as planned  Foot exam updated today  Eye exam updated in office today  Encouraged to f/u with eye doctor asap      Lab Results   Component Value Date    HGBA1C 7 2 (H) 05/13/2022

## 2022-06-16 NOTE — PROGRESS NOTES
Pt is a61 y o  postmenopausal female who presents for preventive care    Vitamin D: Daily multivitamin and vit  D3 1000 units  Mammo: Last done 2018, mammogram ordered  Colonoscopy: FIT completed 2/2022  DEXA: has not done before, ordered  Past Medical History:   Diagnosis Date    Anxiety     Arthritis     last assessed: June 4, 2012    Depression     Diabetes [facility])     mellitus - Resolved: Nov 18, 2015    Hashimoto's thyroiditis        Past Surgical History:   Procedure Laterality Date    CATARACT EXTRACTION Left     FOOT SURGERY Left     plantars wart removed    TONSILLECTOMY         Ob Hx:   OB History   No obstetric history on file         Gyn HX:  denies STD, abnormal pap, significant dysmenorrhea or irregular menses      Current Outpatient Medications:     betamethasone dipropionate (DIPROSONE) 0 05 % cream, apply topically to affected area twice a day, Disp: 45 g, Rfl: 0    cholecalciferol (VITAMIN D3) 1,000 units tablet, Take by mouth daily  , Disp: , Rfl:     finasteride (PROPECIA) 1 MG tablet, Take 1 tablet (1 mg total) by mouth daily, Disp: 30 tablet, Rfl: 5    FLUoxetine (PROzac) 40 MG capsule, take 1 capsule by mouth once daily, Disp: 30 capsule, Rfl: 0    ibuprofen (MOTRIN) 600 mg tablet, Take 600 mg by mouth every 6 (six) hours as needed, Disp: , Rfl:     levothyroxine 112 mcg tablet, take 1 tablet by mouth once daily, Disp: 30 tablet, Rfl: 5    losartan (COZAAR) 100 MG tablet, take 1 tablet by mouth once daily, Disp: 30 tablet, Rfl: 0    metFORMIN (GLUCOPHAGE) 500 mg tablet, take 2 tablets (1,000 MG TOTAL) by mouth twice a day with meals, Disp: 360 tablet, Rfl: 1    MULTIPLE VITAMIN PO, Take by mouth daily  , Disp: , Rfl:     QUEtiapine (SEROquel) 50 mg tablet, take 1 tablet by mouth once daily at bedtime, Disp: 30 tablet, Rfl: 0    simvastatin (ZOCOR) 20 mg tablet, take 1 tablet by mouth once daily, Disp: 30 tablet, Rfl: 0    spironolactone (ALDACTONE) 50 mg tablet, Take 1 tablet (50 mg total) by mouth 2 (two) times a day, Disp: 180 tablet, Rfl: 2    tolterodine (DETROL LA) 4 mg 24 hr capsule, Take 1 capsule (4 mg total) by mouth in the morning , Disp: 90 capsule, Rfl: 3    mometasone (NASONEX) 50 mcg/act nasal spray, Use 2 sprays in each nostril at bedtime as needed (Patient not taking: Reported on 6/16/2022), Disp: 1 Act, Rfl: 2    Allergies   Allergen Reactions    Lisinopril Cough     Reaction Date: 22Nov2011;     Losartan Eye Swelling       Social History     Socioeconomic History    Marital status:      Spouse name: None    Number of children: None    Years of education: None    Highest education level: None   Occupational History    None   Tobacco Use    Smoking status: Never Smoker    Smokeless tobacco: Never Used   Vaping Use    Vaping Use: Never used   Substance and Sexual Activity    Alcohol use: No    Drug use: No    Sexual activity: None   Other Topics Concern    None   Social History Narrative    Lives alone without help available     Martial history:      Travels by public transportation      Social Determinants of Health     Financial Resource Strain: Not on file   Food Insecurity: Not on file   Transportation Needs: Not on file   Physical Activity: Not on file   Stress: Not on file   Social Connections: Not on file   Intimate Partner Violence: Not on file   Housing Stability: Not on file       Family History   Problem Relation Age of Onset    Diabetes Mother         mellitus     Diabetes type II Mother     Mental illness Mother     Esophageal cancer Father     No Known Problems Brother     No Known Problems Brother     No Known Problems Brother        Physical Exam  Vitals reviewed  Exam conducted with a chaperone present  Constitutional:       General: She is not in acute distress  Appearance: Normal appearance  She is obese  She is not ill-appearing or diaphoretic  HENT:      Head: Normocephalic and atraumatic  Right Ear: External ear normal       Left Ear: External ear normal    Eyes:      General: No scleral icterus  Conjunctiva/sclera: Conjunctivae normal    Neck:      Thyroid: No thyromegaly  Cardiovascular:      Rate and Rhythm: Normal rate and regular rhythm  Pulses: Normal pulses  no weak pulses          Dorsalis pedis pulses are 2+ on the right side and 2+ on the left side  Posterior tibial pulses are 2+ on the right side and 2+ on the left side  Heart sounds: Normal heart sounds  No murmur heard  Pulmonary:      Effort: Pulmonary effort is normal  No respiratory distress  Breath sounds: Normal breath sounds  No wheezing  Chest:      Chest wall: No tenderness  Breasts: Breasts are symmetrical       Right: Normal  No axillary adenopathy  Left: Normal  No axillary adenopathy  Abdominal:      General: Bowel sounds are normal       Palpations: Abdomen is soft  Tenderness: There is no abdominal tenderness  Genitourinary:     General: Normal vulva  Vagina: Vaginal discharge (small amt white curdlike) present  Musculoskeletal:         General: Normal range of motion  Cervical back: Normal range of motion and neck supple  Right lower leg: No edema  Left lower leg: No edema  Feet:      Right foot:      Skin integrity: No ulcer, skin breakdown, erythema, warmth, callus or dry skin  Left foot:      Skin integrity: Callus (on heel) present  No ulcer, skin breakdown, erythema, warmth or dry skin  Lymphadenopathy:      Upper Body:      Right upper body: No axillary adenopathy  Left upper body: No axillary adenopathy  Skin:     General: Skin is warm and dry  Capillary Refill: Capillary refill takes less than 2 seconds  Neurological:      General: No focal deficit present  Mental Status: She is alert and oriented to person, place, and time  Mental status is at baseline     Psychiatric:         Mood and Affect: Mood normal  Behavior: Behavior normal          Thought Content: Thought content normal          Judgment: Judgment normal            vulva: no lesions, masses, epithelial changes, or exudate  vagina: color pink, rugae  flattening of rugae, discharge  white, bleeding  with a small amount of bleeding and lesions  cervix: nullip and no lesions   uterus: nontender, mobile  adnexa: no masses or tenderness  rectum: no masses or nodularity  pelvis:  adequate gynecoid     Patient's shoes and socks removed  Right Foot/Ankle   Right Foot Inspection  Skin Exam: skin normal and skin intact  No dry skin, no warmth, no callus, no erythema, no maceration, no abnormal color, no pre-ulcer, no ulcer and no callus  Toe Exam: ROM and strength within normal limits  No swelling, no tenderness, erythema and  no right toe deformity    Sensory   Vibration: intact  Proprioception: intact  Monofilament testing: intact    Vascular  Capillary refills: < 3 seconds  The right DP pulse is 2+  The right PT pulse is 2+  Left Foot/Ankle  Left Foot Inspection  Skin Exam: skin intact and callus (on heel)  No dry skin, no warmth, no erythema, no maceration, normal color, no pre-ulcer and no ulcer  Toe Exam: ROM and strength within normal limits  No swelling, no tenderness, no erythema and no left toe deformity  Sensory   Vibration: intact  Proprioception: intact  Monofilament testing: intact    Vascular  Capillary refills: < 3 seconds  The left DP pulse is 2+  The left PT pulse is 2+  Assign Risk Category  No deformity present  No loss of protective sensation  No weak pulses  Risk: 0      A/P:  Pt is a 62 y o  postmenopausal female  Radhika Riojas was seen today for gynecologic exam     Diagnoses and all orders for this visit:    Encounter for gynecological examination without abnormal finding  Comments:  Gyn exam/pap smear/breast exam updated today  Will call with pap smear results   update mammo as previously ordered    Abnormal endometrial ultrasound  Comments:  Last completed in 2018, ordered and instructed to update soon  Orders:  -     US pelvis complete non OB; Future    Diabetic polyneuropathy associated with type 2 diabetes mellitus ([facility] [address] )  Comments:  meds managed by endocrine; foot & eye exams updated today    Polycystic ovarian syndrome  Comments:  continue f/u with endocrine as planned    Overactive bladder  Comments:  improved on meds as rx'd by urology, continue follow up as planned    Screening for osteoporosis  -     DXA bone density spine hip and pelvis;  Future

## 2022-06-16 NOTE — ASSESSMENT & PLAN NOTE
Had pelvic ultrasound last in 2018  US ordered, plan to update soon  GYN/breast exam and pap smear updated today

## 2022-06-16 NOTE — ASSESSMENT & PLAN NOTE
Managed by urology  Reports has some good days and some days with increased urgency  Maintain f/u as planned

## 2022-06-20 LAB
CYTOLOGIST CVX/VAG CYTO: NORMAL
DX ICD CODE: NORMAL
HPV I/H RISK 4 DNA CVX QL PROBE+SIG AMP: NEGATIVE
OTHER STN SPEC: NORMAL
PATH REPORT.FINAL DX SPEC: NORMAL
SL AMB NOTE:: NORMAL
SL AMB SPECIMEN ADEQUACY: NORMAL
SL AMB TEST METHODOLOGY: NORMAL

## 2022-06-28 DIAGNOSIS — F33.42 RECURRENT MAJOR DEPRESSIVE DISORDER, IN FULL REMISSION (HCC): ICD-10-CM

## 2022-06-28 DIAGNOSIS — E11.9 TYPE 2 DIABETES MELLITUS WITHOUT COMPLICATION, WITHOUT LONG-TERM CURRENT USE OF INSULIN (HCC): ICD-10-CM

## 2022-06-28 DIAGNOSIS — E78.5 HYPERLIPIDEMIA, UNSPECIFIED HYPERLIPIDEMIA TYPE: ICD-10-CM

## 2022-06-28 RX ORDER — SIMVASTATIN 20 MG
20 TABLET ORAL DAILY
Qty: 90 TABLET | Refills: 1 | Status: SHIPPED | OUTPATIENT
Start: 2022-06-28

## 2022-06-28 RX ORDER — QUETIAPINE FUMARATE 50 MG/1
50 TABLET, FILM COATED ORAL
Qty: 30 TABLET | Refills: 5 | OUTPATIENT
Start: 2022-06-28

## 2022-06-28 RX ORDER — FLUOXETINE HYDROCHLORIDE 40 MG/1
40 CAPSULE ORAL DAILY
Qty: 30 CAPSULE | Refills: 0 | OUTPATIENT
Start: 2022-06-28

## 2022-07-01 DIAGNOSIS — E03.9 ACQUIRED HYPOTHYROIDISM: ICD-10-CM

## 2022-07-03 RX ORDER — LEVOTHYROXINE SODIUM 112 UG/1
TABLET ORAL
Qty: 30 TABLET | Refills: 5 | Status: SHIPPED | OUTPATIENT
Start: 2022-07-03

## 2022-07-15 ENCOUNTER — TELEPHONE (OUTPATIENT)
Dept: OTHER | Facility: OTHER | Age: 62
End: 2022-07-15

## 2022-07-15 NOTE — TELEPHONE ENCOUNTER
Pt stated was suppose to get a call on 7/15/2022 and needs to make an appointment   Patient stated has been trying for weeks

## 2022-07-18 DIAGNOSIS — E03.9 ACQUIRED HYPOTHYROIDISM: ICD-10-CM

## 2022-07-18 DIAGNOSIS — E11.9 TYPE 2 DIABETES MELLITUS WITHOUT COMPLICATION, WITHOUT LONG-TERM CURRENT USE OF INSULIN (HCC): Primary | ICD-10-CM

## 2022-07-18 NOTE — TELEPHONE ENCOUNTER
Patient has trouble with [employer] transportation  No showed 7-13-22 and 5-26-22 appointments  Was last seen 4-28-21  Last blood work was done 5-13-22  Patient scheduled for 11-17-22  Due to transportation, she's scheduling pcp & our appointment on the same day  What labs do you want done prior to the appointment? Mail lab slip & appointment card to patient

## 2022-08-29 DIAGNOSIS — L68.0 HIRSUTISM: ICD-10-CM

## 2022-08-29 DIAGNOSIS — E28.2 PCOS (POLYCYSTIC OVARIAN SYNDROME): ICD-10-CM

## 2022-08-29 RX ORDER — SPIRONOLACTONE 50 MG/1
50 TABLET, FILM COATED ORAL 2 TIMES DAILY
Qty: 180 TABLET | Refills: 2 | Status: SHIPPED | OUTPATIENT
Start: 2022-08-29

## 2022-08-29 RX ORDER — SPIRONOLACTONE 50 MG/1
TABLET, FILM COATED ORAL
Qty: 180 TABLET | Refills: 2 | Status: SHIPPED | OUTPATIENT
Start: 2022-08-29 | End: 2022-08-29 | Stop reason: SDUPTHER

## 2022-09-27 DIAGNOSIS — E11.9 TYPE 2 DIABETES MELLITUS WITHOUT COMPLICATION, WITHOUT LONG-TERM CURRENT USE OF INSULIN (HCC): Primary | ICD-10-CM

## 2022-09-27 NOTE — TELEPHONE ENCOUNTER
Patient needs a refill of her Metformin, but she has not been here in so long because she has to rely on [employer] to get her here  She does have a follow up in November so I set this up as a 30 day with 1 refill to hold her over

## 2022-10-02 DIAGNOSIS — E78.5 HYPERLIPIDEMIA, UNSPECIFIED HYPERLIPIDEMIA TYPE: ICD-10-CM

## 2022-10-03 RX ORDER — SIMVASTATIN 20 MG
TABLET ORAL
Qty: 90 TABLET | Refills: 1 | Status: SHIPPED | OUTPATIENT
Start: 2022-10-03

## 2022-10-23 ENCOUNTER — TELEPHONE (OUTPATIENT)
Dept: OTHER | Facility: OTHER | Age: 62
End: 2022-10-23

## 2022-10-23 NOTE — TELEPHONE ENCOUNTER
Patient was seen in the ER on 10-22-22, and was recommended home care  Patient is refusing home care

## 2022-11-03 ENCOUNTER — HOSPITAL ENCOUNTER (OUTPATIENT)
Dept: MAMMOGRAPHY | Facility: IMAGING CENTER | Age: 62
Discharge: HOME | End: 2022-11-03

## 2022-11-03 ENCOUNTER — HOSPITAL ENCOUNTER (OUTPATIENT)
Dept: BONE DENSITY | Facility: IMAGING CENTER | Age: 62
Discharge: HOME | End: 2022-11-03

## 2022-11-03 VITALS — WEIGHT: 277 LBS | HEIGHT: 64 IN | BODY MASS INDEX: 47.29 KG/M2

## 2022-11-03 DIAGNOSIS — Z12.31 ENCOUNTER FOR SCREENING MAMMOGRAM FOR BREAST CANCER: ICD-10-CM

## 2022-11-03 DIAGNOSIS — Z13.820 SCREENING FOR OSTEOPOROSIS: ICD-10-CM

## 2022-11-10 LAB
ALBUMIN SERPL-MCNC: 4.7 G/DL (ref 3.8–4.9)
ALBUMIN/GLOB SERPL: 2 {RATIO} (ref 1.2–2.2)
ALP SERPL-CCNC: 64 IU/L (ref 44–121)
ALT SERPL-CCNC: 24 IU/L (ref 0–32)
AST SERPL-CCNC: 28 IU/L (ref 0–40)
BILIRUB SERPL-MCNC: 0.5 MG/DL (ref 0–1.2)
BUN SERPL-MCNC: 13 MG/DL (ref 8–27)
BUN/CREAT SERPL: 18 (ref 12–28)
CALCIUM SERPL-MCNC: 10.1 MG/DL (ref 8.7–10.3)
CHLORIDE SERPL-SCNC: 96 MMOL/L (ref 96–106)
CO2 SERPL-SCNC: 26 MMOL/L (ref 20–29)
CREAT SERPL-MCNC: 0.74 MG/DL (ref 0.57–1)
EGFR: 93 ML/MIN/1.73
EST. AVERAGE GLUCOSE BLD GHB EST-MCNC: 157 MG/DL
GLOBULIN SER-MCNC: 2.4 G/DL (ref 1.5–4.5)
GLUCOSE SERPL-MCNC: 104 MG/DL (ref 70–99)
HBA1C MFR BLD: 7.1 % (ref 4.8–5.6)
POTASSIUM SERPL-SCNC: 4.6 MMOL/L (ref 3.5–5.2)
PROT SERPL-MCNC: 7.1 G/DL (ref 6–8.5)
SODIUM SERPL-SCNC: 137 MMOL/L (ref 134–144)
T4 FREE SERPL-MCNC: 1.4 NG/DL (ref 0.82–1.77)
TSH SERPL DL<=0.005 MIU/L-ACNC: 4.24 UIU/ML (ref 0.45–4.5)

## 2022-11-10 PROCEDURE — 3051F HG A1C>EQUAL 7.0%<8.0%: CPT | Performed by: PHYSICIAN ASSISTANT

## 2022-11-17 ENCOUNTER — OFFICE VISIT (OUTPATIENT)
Dept: ENDOCRINOLOGY | Facility: HOSPITAL | Age: 62
End: 2022-11-17

## 2022-11-17 ENCOUNTER — OFFICE VISIT (OUTPATIENT)
Dept: FAMILY MEDICINE CLINIC | Facility: HOSPITAL | Age: 62
End: 2022-11-17

## 2022-11-17 VITALS
SYSTOLIC BLOOD PRESSURE: 122 MMHG | WEIGHT: 272 LBS | OXYGEN SATURATION: 94 % | HEIGHT: 64 IN | BODY MASS INDEX: 46.44 KG/M2 | HEART RATE: 88 BPM | DIASTOLIC BLOOD PRESSURE: 80 MMHG

## 2022-11-17 VITALS
DIASTOLIC BLOOD PRESSURE: 80 MMHG | TEMPERATURE: 97.6 F | BODY MASS INDEX: 47.05 KG/M2 | HEART RATE: 88 BPM | HEIGHT: 64 IN | WEIGHT: 275.6 LBS | OXYGEN SATURATION: 95 % | SYSTOLIC BLOOD PRESSURE: 122 MMHG

## 2022-11-17 DIAGNOSIS — E11.9 TYPE 2 DIABETES MELLITUS WITHOUT COMPLICATION, WITHOUT LONG-TERM CURRENT USE OF INSULIN (HCC): Primary | ICD-10-CM

## 2022-11-17 DIAGNOSIS — M25.551 RIGHT HIP PAIN: ICD-10-CM

## 2022-11-17 DIAGNOSIS — E66.01 MORBID OBESITY WITH BMI OF 45.0-49.9, ADULT (HCC): ICD-10-CM

## 2022-11-17 DIAGNOSIS — E11.9 TYPE 2 DIABETES MELLITUS WITHOUT COMPLICATION, WITHOUT LONG-TERM CURRENT USE OF INSULIN (HCC): ICD-10-CM

## 2022-11-17 DIAGNOSIS — E78.2 MIXED HYPERLIPIDEMIA: ICD-10-CM

## 2022-11-17 DIAGNOSIS — Z00.00 ANNUAL PHYSICAL EXAM: Primary | ICD-10-CM

## 2022-11-17 DIAGNOSIS — E03.8 HYPOTHYROIDISM DUE TO HASHIMOTO'S THYROIDITIS: ICD-10-CM

## 2022-11-17 DIAGNOSIS — E03.9 ACQUIRED HYPOTHYROIDISM: ICD-10-CM

## 2022-11-17 DIAGNOSIS — I10 PRIMARY HYPERTENSION: ICD-10-CM

## 2022-11-17 DIAGNOSIS — E28.2 POLYCYSTIC OVARIAN SYNDROME: ICD-10-CM

## 2022-11-17 DIAGNOSIS — E06.3 HYPOTHYROIDISM DUE TO HASHIMOTO'S THYROIDITIS: ICD-10-CM

## 2022-11-17 DIAGNOSIS — E78.5 HYPERLIPIDEMIA, UNSPECIFIED HYPERLIPIDEMIA TYPE: ICD-10-CM

## 2022-11-17 DIAGNOSIS — Z12.11 SCREEN FOR COLON CANCER: ICD-10-CM

## 2022-11-17 DIAGNOSIS — F33.42 RECURRENT MAJOR DEPRESSIVE DISORDER, IN FULL REMISSION (HCC): ICD-10-CM

## 2022-11-17 RX ORDER — SIMVASTATIN 20 MG
20 TABLET ORAL DAILY
Qty: 90 TABLET | Refills: 1 | Status: SHIPPED | OUTPATIENT
Start: 2022-11-17

## 2022-11-17 RX ORDER — LEVOTHYROXINE SODIUM 112 UG/1
112 TABLET ORAL DAILY
Qty: 30 TABLET | Refills: 5 | Status: SHIPPED | OUTPATIENT
Start: 2022-11-17

## 2022-11-17 NOTE — PATIENT INSTRUCTIONS
Wellness Visit for Adults   AMBULATORY CARE:   A wellness visit  is when you see your healthcare provider to get screened for health problems  Your healthcare provider will also give you advice on how to stay healthy  Write down your questions so you remember to ask them  Ask your healthcare provider how often you should have a wellness visit  What happens at a wellness visit:  Your healthcare provider will ask about your health, and your family history of health problems  This includes high blood pressure, heart disease, and cancer  He or she will ask if you have symptoms that concern you, if you smoke, and about your mood  You may also be asked about your intake of medicines, supplements, food, and alcohol  Any of the following may be done:  · Your weight  will be checked  Your height may also be checked so your body mass index (BMI) can be calculated  Your BMI shows if you are at a healthy weight  · Your blood pressure  and heart rate will be checked  Your temperature may also be checked  · Blood and urine tests  may be done  Blood tests may be done to check your cholesterol levels  Abnormal cholesterol levels increase your risk for heart disease and stroke  You may also need a blood or urine test to check for diabetes if you are at increased risk  Urine tests may be done to look for signs of an infection or kidney disease  · A physical exam  includes checking your heartbeat and lungs with a stethoscope  Your healthcare provider may also check your skin to look for sun damage  · Screening tests  may be recommended  A screening test is done to check for diseases that may not cause symptoms  The screening tests you may need depend on your age, gender, family history, and lifestyle habits  For example, colorectal screening may be recommended if you are 48years old or older  Screening tests you need if you are a woman:   · A Pap smear  is used to screen for cervical cancer   Pap smears are usually done every 3 to 5 years depending on your age  You may need them more often if you have had abnormal Pap smear test results in the past  Ask your healthcare provider how often you should have a Pap smear  · A mammogram  is an x-ray of your breasts to screen for breast cancer  Experts recommend mammograms every 2 years starting at age 48 years  You may need a mammogram at age 52 years or younger if you have an increased risk for breast cancer  Talk to your healthcare provider about when you should start having mammograms and how often you need them  Vaccines you may need:   · Get an influenza vaccine  every year  The influenza vaccine protects you from the flu  Several types of viruses cause the flu  The viruses change over time, so new vaccines are made each year  · Get a tetanus-diphtheria (Td) booster vaccine  every 10 years  This vaccine protects you against tetanus and diphtheria  Tetanus is a severe infection that may cause painful muscle spasms and lockjaw  Diphtheria is a severe bacterial infection that causes a thick covering in the back of your mouth and throat  · Get a human papillomavirus (HPV) vaccine  if you are female and aged 23 to 32 or male 23 to 24 and never received it  This vaccine protects you from HPV infection  HPV is the most common infection spread by sexual contact  HPV may also cause vaginal, penile, and anal cancers  · Get a pneumococcal vaccine  if you are aged 72 years or older  The pneumococcal vaccine is an injection given to protect you from pneumococcal disease  Pneumococcal disease is an infection caused by pneumococcal bacteria  The infection may cause pneumonia, meningitis, or an ear infection  · Get a shingles vaccine  if you are 60 or older, even if you have had shingles before  The shingles vaccine is an injection to protect you from the varicella-zoster virus  This is the same virus that causes chickenpox   Shingles is a painful rash that develops in people who had chickenpox or have been exposed to the virus  How to eat healthy:  My Plate is a model for planning healthy meals  It shows the types and amounts of foods that should go on your plate  Fruits and vegetables make up about half of your plate, and grains and protein make up the other half  A serving of dairy is included on the side of your plate  The amount of calories and serving sizes you need depends on your age, gender, weight, and height  Examples of healthy foods are listed below:  · Eat a variety of vegetables  such as dark green, red, and orange vegetables  You can also include canned vegetables low in sodium (salt) and frozen vegetables without added butter or sauces  · Eat a variety of fresh fruits , canned fruit in 100% juice, frozen fruit, and dried fruit  · Include whole grains  At least half of the grains you eat should be whole grains  Examples include whole-wheat bread, wheat pasta, brown rice, and whole-grain cereals such as oatmeal     · Eat a variety of protein foods such as seafood (fish and shellfish), lean meat, and poultry without skin (turkey and chicken)  Examples of lean meats include pork leg, shoulder, or tenderloin, and beef round, sirloin, tenderloin, and extra lean ground beef  Other protein foods include eggs and egg substitutes, beans, peas, soy products, nuts, and seeds  · Choose low-fat dairy products such as skim or 1% milk or low-fat yogurt, cheese, and cottage cheese  · Limit unhealthy fats  such as butter, hard margarine, and shortening  Exercise:  Exercise at least 30 minutes per day on most days of the week  Some examples of exercise include walking, biking, dancing, and swimming  You can also fit in more physical activity by taking the stairs instead of the elevator or parking farther away from stores  Include muscle strengthening activities 2 days each week  Regular exercise provides many health benefits   It helps you manage your weight, and decreases your risk for type 2 diabetes, heart disease, stroke, and high blood pressure  Exercise can also help improve your mood  Ask your healthcare provider about the best exercise plan for you  General health and safety guidelines:   · Do not smoke  Nicotine and other chemicals in cigarettes and cigars can cause lung damage  Ask your healthcare provider for information if you currently smoke and need help to quit  E-cigarettes or smokeless tobacco still contain nicotine  Talk to your healthcare provider before you use these products  · Limit alcohol  A drink of alcohol is 12 ounces of beer, 5 ounces of wine, or 1½ ounces of liquor  · Lose weight, if needed  Being overweight increases your risk of certain health conditions  These include heart disease, high blood pressure, type 2 diabetes, and certain types of cancer  · Protect your skin  Do not sunbathe or use tanning beds  Use sunscreen with a SPF 15 or higher  Apply sunscreen at least 15 minutes before you go outside  Reapply sunscreen every 2 hours  Wear protective clothing, hats, and sunglasses when you are outside  · Drive safely  Always wear your seatbelt  Make sure everyone in your car wears a seatbelt  A seatbelt can save your life if you are in an accident  Do not use your cell phone when you are driving  This could distract you and cause an accident  Pull over if you need to make a call or send a text message  · Practice safe sex  Use latex condoms if are sexually active and have more than one partner  Your healthcare provider may recommend screening tests for sexually transmitted infections (STIs)  · Wear helmets, lifejackets, and protective gear  Always wear a helmet when you ride a bike or motorcycle, go skiing, or play sports that could cause a head injury  Wear protective equipment when you play sports  Wear a lifejacket when you are on a boat or doing water sports      © Copyright [employer] 2022 Information is for End User's use only and may not be sold, redistributed or otherwise used for commercial purposes  All illustrations and images included in CareNotes® are the copyrighted property of A D A M , Inc  or Monie Pablo  The above information is an  only  It is not intended as medical advice for individual conditions or treatments  Talk to your doctor, nurse or pharmacist before following any medical regimen to see if it is safe and effective for you  Obesity   AMBULATORY CARE:   Obesity  means your body mass index (BMI) is greater than 30  Your healthcare provider will use your height and weight to measure your BMI  The risks of obesity include  many health problems, including injuries or physical disability  · Diabetes (high blood sugar level)    · High blood pressure or high cholesterol    · Heart disease    · Stroke    · Gallbladder or liver disease    · Cancer of the colon, breast, prostate, liver, or kidney    · Sleep apnea    · Arthritis or gout    Screening  is done to check for health conditions before you have signs or symptoms  If you are 28to 79years old, your blood sugar level may be checked every 3 years for signs of prediabetes or diabetes  Your healthcare provider will check your blood pressure at each visit  High blood pressure can lead to a stroke or other problems  Your provider may check for signs of heart disease, cancer, or other health problems  Seek care immediately if:   · You have a severe headache, confusion, or difficulty speaking  · You have weakness on one side of your body  · You have chest pain, sweating, or shortness of breath  Call your doctor if:   · You have symptoms of gallbladder or liver disease, such as pain in your upper abdomen  · You have knee or hip pain and discomfort while walking  · You have symptoms of diabetes, such as intense hunger and thirst, and frequent urination      · You have symptoms of sleep apnea, such as snoring or daytime sleepiness  · You have questions or concerns about your condition or care  Treatment for obesity  focuses on helping you lose weight to improve your health  Even a small decrease in BMI can reduce the risk for many health problems  Your healthcare provider will help you set a weight-loss goal   · Lifestyle changes  are the first step in treating obesity  These include making healthy food choices and getting regular physical activity  Your healthcare provider may suggest a weight-loss program that involves coaching, education, and therapy  · Medicine  may help you lose weight when it is used with a healthy foods and physical activity  · Surgery  can help you lose weight if you are very obese and have other health problems  There are several types of weight-loss surgery  Ask your healthcare provider for more information  Tips for safe weight loss:   · Set small, realistic goals  An example of a small goal is to walk for 20 minutes 5 days a week  Anther goal is to lose 5% of your body weight  · Tell friends, family members, and coworkers about your goals  and ask for their support  Ask a friend to lose weight with you, or join a weight-loss support group  · Identify foods or triggers that may cause you to overeat , and find ways to avoid them  Remove tempting high-calorie foods from your home and workplace  Place a bowl of fresh fruit on your kitchen counter  If stress causes you to eat, then find other ways to cope with stress  A counselor or therapist may be able to help you  · Keep a diary to track what you eat and drink  Also write down how many minutes of physical activity you do each day  Weigh yourself once a week and record it in your diary  Eating changes: You will need to eat 500 to 1,000 fewer calories each day than you currently eat to lose 1 to 2 pounds a week  The following changes will help you cut calories:  · Eat smaller portions    Use small plates, no larger than 9 inches in diameter  Fill your plate half full of fruits and vegetables  Measure your food using measuring cups until you know what a serving size looks like  · Eat 3 meals and 1 or 2 snacks each day  Plan your meals in advance  Ngozi Vivas and eat at home most of the time  Eat slowly  Do not skip meals  Skipping meals can lead to overeating later in the day  This can make it harder for you to lose weight  Talk with a dietitian to help you make a meal plan and schedule that is right for you  · Eat fruits and vegetables at every meal   They are low in calories and high in fiber, which makes you feel full  Do not add butter, margarine, or cream sauce to vegetables  Use herbs to season steamed vegetables  · Eat less fat and fewer fried foods  Eat more baked or grilled chicken and fish  These protein sources are lower in calories and fat than red meat  Limit fast food  Dress your salads with olive oil and vinegar instead of bottled dressing  · Limit the amount of sugar you eat  Do not drink sugary beverages  Limit alcohol  Activity changes:  Physical activity is good for your body in many ways  It helps you burn calories and build strong muscles  It decreases stress and depression, and improves your mood  It can also help you sleep better  Talk to your healthcare provider before you begin an exercise program   · Exercise for at least 30 minutes 5 days a week  Start slowly  Set aside time each day for physical activity that you enjoy and that is convenient for you  It is best to do both weight training and an activity that increases your heart rate, such as walking, bicycling, or swimming  · Find ways to be more active  Do yard work and housecleaning  Walk up the stairs instead of using elevators  Spend your leisure time going to events that require walking, such as outdoor festivals or fairs  This extra physical activity can help you lose weight and keep it off         Follow up with your doctor as directed: You may need to meet with a dietitian  Write down your questions so you remember to ask them during your visits  © Copyright [employer] 2022 Information is for End User's use only and may not be sold, redistributed or otherwise used for commercial purposes  All illustrations and images included in CareNotes® are the copyrighted property of A [employer] A M , Inc  or [facility] Arlene Claire   The above information is an  only  It is not intended as medical advice for individual conditions or treatments  Talk to your doctor, nurse or pharmacist before following any medical regimen to see if it is safe and effective for you

## 2022-11-17 NOTE — PATIENT INSTRUCTIONS
Continue levothyroxine 112 mcg daily  Monitor diet maintain physical activity  Continue metformin 1000 mg twice a day  Encourage checking blood sugar at least daily to see how sugars are trending  Contact the office with any concerns or questions  Follow-up in 6 months with lab work completed prior to visit

## 2022-11-17 NOTE — PROGRESS NOTES
Chase Crespo 62 y o  female MRN: [id number]    Encounter: [id number]      Assessment/Plan     Assessment: This is a 62y o -year-old female with type 2 diabetes with neuropathy, hypertension and hyperlipidemia, hypothyroidism, and polycystic ovarian syndrome  Plan:  1  Type 2 diabetes:  Most recent hemoglobin A1c has decreased slightly to 7 1  She is currently on the max dose of metformin  She is slightly hesitant about starting a new medication will she has already on quite a bit  At this time I am fine with holding off another 6 months, but encouraged lifestyle modifications help improve glucose levels  If A1c does not improve, will have to consider starting an additional medication  Encouraged her to check glucose level at least daily  Contact the office with any concerns or questions  Follow-up in 6 months with lab work completed prior to visit  2  Diabetic neuropathy:  Stable  Diabetic foot exam is up-to-date  3  Hypothyroidism due to Hashimoto's thyroiditis:  Most recent thyroid lab work came back normal   Clinically and biochemically euthyroid  At this time she will continue with levothyroxine 112 mcg daily  Repeat lab work prior to next office visit  4  Polycystic ovarian syndrome:  Is not taking Propecia that was prescribed after last office visit  At this time will continue with spironolactone 50 mg daily  If it does seem like symptoms are getting worse, we can always increase the dose  5  Hypertension:  Normotensive in the office today  Kidney function remains stable with normal electrolytes  Continue with losartan and spironolactone  Repeat CMP prior to next office visit  6  Hyperlipidemia:  Previous lipid panel did show slightly elevated triglycerides that have improved  This may be due to hyperglycemia  Continue with atorvastatin at this time  Will continue to monitor over time      CC:  Type 2 diabetes, hypothyroidism, PCOS follow-up    History of Present Illness HPI:  Lawayne Romberg is a 62year old female with type 2 diabetes with neuropathy for 5 years, hypothyroidism, PCOS, hypertension, hyperlipidemia for follow-up visit  She has transportation issues, was last seen in the office 1 5 years ago  She is on oral agents at home and takes Metformin 1000 mg twice a day  She denies any polyphagia, polydipsia, nocturia and blurry vision  She has daytime polyuria and some dry mouth post some of her medications  She denies chest pain or shortness of breath  She denies numbness or tingling of the feet  She denies nephropathy, retinopathy, heart attack, stroke and claudication but does admit to neuropathy  She does admit to limited physical activity  Also states that she could improve her diet to help improve glucose levels      Hypoglycemic episodes: No never      The patient's last eye exam was in June 2022 with no retinopathy  The patient's last foot exam was in June 2022  She does see Podiatry once a year  Most recent hemoglobin A1c November 9, 2022 was 7  1      Blood Sugar/Glucometer/Pump/CGM review: does not check blood sugars         She has hypothyroidism due to Hashimoto's thyroiditis and takes levothyroxine 112 mcg daily  Weight is 4 lb less than last year  Fatigue is about the same  She has some intermittent constipation  Continues with hair loss  She denies heat or cold intolerance, palpitation, tremors, diarrhea, or insomnia  Does have chronic depression  She denies dry skin or brittle nails  She denies diplopia  Denies any neck compressive symptoms      She has hypertension and takes losartan 100 mg daily and spironolactone 50 mg twice daily for her hirsutism from PCOS  She denies headache or stroke-like symptoms  She has significant worsening of her hirsutism on her chin and significant worsening of her hair loss the male pattern    Propecia was prescribed after last office visit, but was not covered by the insurance      She has hyperlipidemia and takes simvastatin 20 mg daily  She denies chest pain or shortness of breath  Review of Systems   Constitutional: Positive for fatigue  Negative for activity change, appetite change, diaphoresis and unexpected weight change  HENT: Negative for sore throat, trouble swallowing and voice change  Eyes: Negative for visual disturbance  Respiratory: Negative for chest tightness and shortness of breath  Cardiovascular: Negative for chest pain, palpitations and leg swelling  Gastrointestinal: Negative for abdominal pain, constipation, diarrhea, nausea and vomiting  Endocrine: Negative for cold intolerance, heat intolerance, polydipsia, polyphagia and polyuria  Genitourinary: Negative for frequency  Skin: Negative for rash and wound  Hair loss and Hirsutism   Neurological: Positive for numbness  Negative for dizziness, tremors, weakness, light-headedness and headaches  Hematological: Negative for adenopathy  Psychiatric/Behavioral: Positive for dysphoric mood  Negative for sleep disturbance  The patient is not nervous/anxious          Historical Information   Past Medical History:   Diagnosis Date   • Anxiety    • Arthritis     last assessed: June 4, 2012   • Depression    • Diabetes [facility])     mellitus - Resolved: Nov 18, 2015   • Hashimoto's thyroiditis      Past Surgical History:   Procedure Laterality Date   • CATARACT EXTRACTION Left    • FOOT SURGERY Left     plantars wart removed   • TONSILLECTOMY       Social History   Social History     Substance and Sexual Activity   Alcohol Use No     Social History     Substance and Sexual Activity   Drug Use No     Social History     Tobacco Use   Smoking Status Never   Smokeless Tobacco Never     Family History:   Family History   Problem Relation Age of Onset   • Diabetes Mother         mellitus    • Diabetes type II Mother    • Mental illness Mother    • Esophageal cancer Father    • No Known Problems Maternal Grandmother    • No Known Problems Maternal Grandfather    • No Known Problems Paternal Grandmother    • No Known Problems Paternal Grandfather    • No Known Problems Brother    • No Known Problems Brother    • No Known Problems Brother    • Cancer Maternal Aunt         unknown primary   • No Known Problems Maternal Aunt    • No Known Problems Paternal Aunt    • No Known Problems Paternal Aunt    • No Known Problems Paternal Aunt        Meds/Allergies   Current Outpatient Medications   Medication Sig Dispense Refill   • betamethasone dipropionate (DIPROSONE) 0 05 % cream apply topically to affected area twice a day 45 g 0   • cholecalciferol (VITAMIN D3) 1,000 units tablet Take by mouth daily       • FLUoxetine (PROzac) 40 MG capsule take 1 capsule by mouth once daily 30 capsule 5   • levothyroxine 112 mcg tablet Take 1 tablet (112 mcg total) by mouth daily 30 tablet 5   • losartan (COZAAR) 100 MG tablet take 1 tablet by mouth once daily 30 tablet 5   • metFORMIN (GLUCOPHAGE) 500 mg tablet Take 2 tablets (1,000 mg total) by mouth 2 (two) times a day with meals 120 tablet 5   • MULTIPLE VITAMIN PO Take by mouth daily       • Naproxen Sodium (ALEVE PO) Take by mouth     • QUEtiapine (SEROquel) 50 mg tablet take 1 tablet by mouth once daily at bedtime 30 tablet 5   • simvastatin (ZOCOR) 20 mg tablet Take 1 tablet (20 mg total) by mouth daily 90 tablet 1   • spironolactone (ALDACTONE) 50 mg tablet Take 1 tablet (50 mg total) by mouth 2 (two) times a day 180 tablet 2   • tolterodine (DETROL LA) 4 mg 24 hr capsule Take 1 capsule (4 mg total) by mouth in the morning  90 capsule 3     No current facility-administered medications for this visit  Allergies   Allergen Reactions   • Lisinopril Cough     Reaction Date: [id number];    • Losartan Eye Swelling       Objective   Vitals: Blood pressure 122/80, pulse 88, height 5' 4" (1 626 m), weight 123 kg (272 lb), SpO2 94 %  Physical Exam  Vitals and nursing note reviewed     Constitutional: General: She is not in acute distress  HENT:      Head: Normocephalic and atraumatic  Eyes:      Pupils: Pupils are equal, round, and reactive to light  Cardiovascular:      Rate and Rhythm: Normal rate and regular rhythm  Heart sounds: No murmur heard  Pulmonary:      Effort: Pulmonary effort is normal  No respiratory distress  Breath sounds: Normal breath sounds  No wheezing  Musculoskeletal:         General: No swelling  Cervical back: Normal range of motion  Lymphadenopathy:      Cervical: No cervical adenopathy  Neurological:      Mental Status: She is alert and oriented to person, place, and time  Sensory: No sensory deficit  Psychiatric:         Mood and Affect: Mood normal          Behavior: Behavior normal          Thought Content: Thought content normal          The history was obtained from the review of the chart, patient      Lab Results:   Lab Results   Component Value Date/Time    Hemoglobin A1C 7 1 (H) 11/09/2022 01:06 PM    Hemoglobin A1C 7 2 (H) 05/13/2022 01:49 PM    White Blood Cell Count 7 1 05/13/2022 01:49 PM    White Blood Cell Count 7 3 05/13/2022 01:49 PM    Hemoglobin 14 8 05/13/2022 01:49 PM    Hemoglobin 15 0 05/13/2022 01:49 PM    HCT 44 4 05/13/2022 01:49 PM    HCT 44 3 05/13/2022 01:49 PM    MCV 94 05/13/2022 01:49 PM    MCV 94 05/13/2022 01:49 PM    Platelet Count 155 04/61/9208 01:49 PM    Platelet Count 419 13/60/8315 01:49 PM    BUN 13 11/09/2022 01:06 PM    BUN 13 05/13/2022 01:49 PM    Potassium 4 6 11/09/2022 01:06 PM    Potassium 4 6 05/13/2022 01:49 PM    Chloride 96 11/09/2022 01:06 PM    Chloride 98 05/13/2022 01:49 PM    CO2 26 11/09/2022 01:06 PM    CO2 22 05/13/2022 01:49 PM    Creatinine 0 74 11/09/2022 01:06 PM    Creatinine 0 70 05/13/2022 01:49 PM    AST 28 11/09/2022 01:06 PM    AST 29 05/13/2022 01:49 PM    ALT 24 11/09/2022 01:06 PM    ALT 26 05/13/2022 01:49 PM    Albumin 4 7 11/09/2022 01:06 PM    Albumin 4 3 05/13/2022 01:49 PM Globulin, Total 2 4 11/09/2022 01:06 PM    Globulin, Total 2 4 05/13/2022 01:49 PM    HDL 37 (L) 05/13/2022 01:49 PM    Triglycerides 169 (H) 05/13/2022 01:49 PM       Portions of the record may have been created with voice recognition software  Occasional wrong word or "sound a like" substitutions may have occurred due to the inherent limitations of voice recognition software  Read the chart carefully and recognize, using context, where substitutions have occurred

## 2022-11-17 NOTE — ASSESSMENT & PLAN NOTE
Mood stable on daily prozac  Continue same dose and return in 6 months for next follow up  Call sooner w/any acute mood concerns/questions

## 2022-11-17 NOTE — PROGRESS NOTES
[facility] [address]     NAME: Derrick Joyce  AGE: 62 y o  SEX: female  : 1960     DATE: 2022     Assessment and Plan:     Problem List Items Addressed This Visit        Endocrine    Hypothyroidism due to Hashimoto's thyroiditis     As managed by endocrine - OV updated today         Type 2 diabetes mellitus without complication, without long-term current use of insulin ([facility] [address] )       Lab Results   Component Value Date    HGBA1C 7 1 (H) 2022     Improving control at goal as managed by endocrine - OV updated today  Foot and eye exams UTD  Maintain f/u as planned         Relevant Orders    CBC and differential       Cardiovascular and Mediastinum    Hypertension     Adequate BP control on daily losartan  Continue same dose and return in 6 months for follow up         Relevant Orders    CBC and differential       Other    Depression     Mood stable on daily prozac  Continue same dose and return in 6 months for next follow up  Call sooner w/any acute mood concerns/questions         Hyperlipidemia     Compliant w/statin daily - continue same dose  Update FLP as ordered by endocrine         Relevant Orders    CBC and differential    Morbid obesity with BMI of 45 0-49 9, adult (HCC)     Weight loss encouraged w/healthy diet and regular exercise        Other Visit Diagnoses     Annual physical exam    -  Primary    PE updated, next due in 1 year; pap and mammo UTD; flu, covid, PCV and adacel boosters UTD at pharmacy    Right hip pain        Relevant Orders    Ambulatory Referral to Orthopedic Surgery    Screen for colon cancer        Relevant Orders    Cologuard        Immunizations and preventive care screenings were discussed with patient today  Appropriate education was printed on patient's after visit summary      Counseling:  Dental Health: discussed importance of regular tooth brushing, flossing, and dental visits  · Exercise: the importance of regular exercise/physical activity was discussed  Recommend exercise 3-5 times per week for at least 30 minutes  Return in about 6 months (around 5/17/2023) for Next scheduled follow up  Chief Complaint:     Chief Complaint   Patient presents with   • Physical Exam      History of Present Illness:     Adult Annual Physical   Patient here for a comprehensive physical exam  The patient reports no problems  Has been able to update her mammogram but was late to get to US appt and plans to reschedule  Diet and Physical Activity  · Diet/Nutrition: diabetic diet  · Exercise: no formal exercise  Depression Screening  PHQ-2/9 Depression Screening         General Health  · Sleep: sleeps well  · Hearing: decreased - right  · Vision: most recent eye exam >1 year ago and wears glasses  · Dental: regular dental visits  /GYN Health  · Patient is: postmenopausal     Review of Systems:     Review of Systems   Constitutional: Negative  HENT: Negative  Respiratory: Negative  Cardiovascular: Negative  Gastrointestinal: Negative  Genitourinary: Negative  Musculoskeletal: Positive for arthralgias (severe right hip pain evaluated in [facility] ED in Sept, pain has improved but she requests consult to ortho for fear of pain returning)  Neurological: Negative  Psychiatric/Behavioral: Negative            Past Medical History:     Past Medical History:   Diagnosis Date   • Anxiety    • Arthritis     last assessed: June 4, 2012   • Depression    • Diabetes [facility])     mellitus - Resolved: Nov 18, 2015   • Hashimoto's thyroiditis       Past Surgical History:     Past Surgical History:   Procedure Laterality Date   • CATARACT EXTRACTION Left    • FOOT SURGERY Left     plantars wart removed   • TONSILLECTOMY        Social History:     Social History     Socioeconomic History   • Marital status:      Spouse name: None   • Number of children: None   • Years of education: None   • Highest education level: None   Occupational History   • None   Tobacco Use   • Smoking status: Never   • Smokeless tobacco: Never   Vaping Use   • Vaping Use: Never used   Substance and Sexual Activity   • Alcohol use: No   • Drug use: No   • Sexual activity: None   Other Topics Concern   • None   Social History Narrative    Lives alone without help available     Martial history:      Travels by public transportation      Social Determinants of Health     Financial Resource Strain: Not on file   Food Insecurity: Not on file   Transportation Needs: Not on file   Physical Activity: Not on file   Stress: Not on file   Social Connections: Not on file   Intimate Partner Violence: Not on file   Housing Stability: Not on file      Family History:     Family History   Problem Relation Age of Onset   • Diabetes Mother         mellitus    • Diabetes type II Mother    • Mental illness Mother    • Esophageal cancer Father    • No Known Problems Maternal Grandmother    • No Known Problems Maternal Grandfather    • No Known Problems Paternal Grandmother    • No Known Problems Paternal Grandfather    • No Known Problems Brother    • No Known Problems Brother    • No Known Problems Brother    • Cancer Maternal Aunt         unknown primary   • No Known Problems Maternal Aunt    • No Known Problems Paternal Aunt    • No Known Problems Paternal Aunt    • No Known Problems Paternal Aunt       Current Medications:     Current Outpatient Medications   Medication Sig Dispense Refill   • betamethasone dipropionate (DIPROSONE) 0 05 % cream apply topically to affected area twice a day 45 g 0   • cholecalciferol (VITAMIN D3) 1,000 units tablet Take by mouth daily       • FLUoxetine (PROzac) 40 MG capsule take 1 capsule by mouth once daily 30 capsule 5   • levothyroxine 112 mcg tablet Take 1 tablet (112 mcg total) by mouth daily 30 tablet 5   • losartan (COZAAR) 100 MG tablet take 1 tablet by mouth once daily 30 tablet 5   • metFORMIN (GLUCOPHAGE) 500 mg tablet Take 2 tablets (1,000 mg total) by mouth 2 (two) times a day with meals 120 tablet 5   • MULTIPLE VITAMIN PO Take by mouth daily       • Naproxen Sodium (ALEVE PO) Take by mouth     • QUEtiapine (SEROquel) 50 mg tablet take 1 tablet by mouth once daily at bedtime 30 tablet 5   • simvastatin (ZOCOR) 20 mg tablet Take 1 tablet (20 mg total) by mouth daily 90 tablet 1   • spironolactone (ALDACTONE) 50 mg tablet Take 1 tablet (50 mg total) by mouth 2 (two) times a day 180 tablet 2   • tolterodine (DETROL LA) 4 mg 24 hr capsule Take 1 capsule (4 mg total) by mouth in the morning  90 capsule 3     No current facility-administered medications for this visit  Allergies: Allergies   Allergen Reactions   • Lisinopril Cough     Reaction Date: [id number];    • Losartan Eye Swelling      Physical Exam:     /80   Pulse 88   Temp 97 6 °F (36 4 °C)   Ht 5' 4" (1 626 m)   Wt 125 kg (275 lb 9 6 oz)   SpO2 95%   BMI 47 31 kg/m²     Physical Exam  Vitals reviewed  Constitutional:       General: She is not in acute distress  Appearance: Normal appearance  HENT:      Head: Normocephalic  Nose: Nose normal       Mouth/Throat:      Mouth: Mucous membranes are moist       Pharynx: Oropharynx is clear  Eyes:      General: No scleral icterus  Neck:      Thyroid: Thyromegaly (smoothly enlarged) present  Cardiovascular:      Rate and Rhythm: Normal rate and regular rhythm  Heart sounds: No murmur heard  Pulmonary:      Effort: Pulmonary effort is normal  No respiratory distress  Breath sounds: Normal breath sounds  Musculoskeletal:      Cervical back: Normal range of motion  Right lower leg: No edema  Left lower leg: No edema  Lymphadenopathy:      Cervical: No cervical adenopathy  Skin:     General: Skin is warm and dry  Neurological:      General: No focal deficit present        Mental Status: She is alert and oriented to person, place, and time  Psychiatric:         Mood and Affect: Mood normal          Behavior: Behavior normal          Thought Content: Thought content normal          Judgment: Judgment normal           JAYSON Richter  [facility] [address]      BMI Counseling: Body mass index is 47 31 kg/m²  The BMI is above normal  Nutrition recommendations include 3-5 servings of fruits/vegetables daily, reducing fast food intake, consuming healthier snacks, moderation in carbohydrate intake and reducing intake of cholesterol  Exercise recommendations include exercising 3-5 times per week

## 2022-11-17 NOTE — ASSESSMENT & PLAN NOTE
Lab Results   Component Value Date    HGBA1C 7 1 (H) 11/09/2022     Improving control at goal as managed by endocrine - OV updated today  Foot and eye exams UTD  Maintain f/u as planned

## 2022-12-02 DIAGNOSIS — F33.42 RECURRENT MAJOR DEPRESSIVE DISORDER, IN FULL REMISSION (HCC): ICD-10-CM

## 2022-12-05 RX ORDER — QUETIAPINE FUMARATE 50 MG/1
TABLET, FILM COATED ORAL
Qty: 30 TABLET | Refills: 5 | Status: SHIPPED | OUTPATIENT
Start: 2022-12-05

## 2022-12-28 DIAGNOSIS — F33.42 RECURRENT MAJOR DEPRESSIVE DISORDER, IN FULL REMISSION (HCC): ICD-10-CM

## 2022-12-28 DIAGNOSIS — E78.5 HYPERLIPIDEMIA, UNSPECIFIED HYPERLIPIDEMIA TYPE: ICD-10-CM

## 2022-12-28 RX ORDER — SIMVASTATIN 20 MG
20 TABLET ORAL DAILY
Qty: 90 TABLET | Refills: 1 | Status: SHIPPED | OUTPATIENT
Start: 2022-12-28

## 2022-12-29 RX ORDER — QUETIAPINE FUMARATE 50 MG/1
50 TABLET, FILM COATED ORAL
Qty: 30 TABLET | Refills: 5 | Status: SHIPPED | OUTPATIENT
Start: 2022-12-29

## 2022-12-31 DIAGNOSIS — F33.42 RECURRENT MAJOR DEPRESSIVE DISORDER, IN FULL REMISSION (HCC): ICD-10-CM

## 2022-12-31 DIAGNOSIS — I10 ESSENTIAL HYPERTENSION: ICD-10-CM

## 2022-12-31 RX ORDER — LOSARTAN POTASSIUM 100 MG/1
TABLET ORAL
Qty: 30 TABLET | Refills: 5 | Status: SHIPPED | OUTPATIENT
Start: 2022-12-31

## 2022-12-31 RX ORDER — FLUOXETINE HYDROCHLORIDE 40 MG/1
CAPSULE ORAL
Qty: 30 CAPSULE | Refills: 5 | Status: SHIPPED | OUTPATIENT
Start: 2022-12-31

## 2023-03-29 DIAGNOSIS — E78.5 HYPERLIPIDEMIA, UNSPECIFIED HYPERLIPIDEMIA TYPE: ICD-10-CM

## 2023-03-29 RX ORDER — SIMVASTATIN 20 MG
20 TABLET ORAL
Qty: 90 TABLET | Refills: 2 | Status: SHIPPED | OUTPATIENT
Start: 2023-03-29

## 2023-04-27 DIAGNOSIS — N32.81 OVERACTIVE BLADDER: ICD-10-CM

## 2023-04-27 RX ORDER — TOLTERODINE 4 MG/1
4 CAPSULE, EXTENDED RELEASE ORAL DAILY
Qty: 90 CAPSULE | Refills: 3 | Status: SHIPPED | OUTPATIENT
Start: 2023-04-27

## 2023-04-28 NOTE — TELEPHONE ENCOUNTER
Left a voicemail per communication sheet that the prescription refill for Detrol LA 4 mg was sent to her pharmacy per [facility]

## 2023-05-10 LAB
ALBUMIN SERPL-MCNC: 4.7 G/DL (ref 3.8–4.8)
ALBUMIN/CREAT UR: 7 MG/G CREAT (ref 0–29)
ALBUMIN/GLOB SERPL: 2.1 {RATIO} (ref 1.2–2.2)
ALP SERPL-CCNC: 57 IU/L (ref 44–121)
ALT SERPL-CCNC: 27 IU/L (ref 0–32)
AST SERPL-CCNC: 38 IU/L (ref 0–40)
BASOPHILS # BLD AUTO: 0 X10E3/UL (ref 0–0.2)
BASOPHILS NFR BLD AUTO: 0 %
BILIRUB SERPL-MCNC: 0.3 MG/DL (ref 0–1.2)
BUN SERPL-MCNC: 11 MG/DL (ref 8–27)
BUN/CREAT SERPL: 15 (ref 12–28)
CALCIUM SERPL-MCNC: 10.1 MG/DL (ref 8.7–10.3)
CHLORIDE SERPL-SCNC: 96 MMOL/L (ref 96–106)
CHOLEST SERPL-MCNC: 138 MG/DL (ref 100–199)
CHOLEST/HDLC SERPL: 3.5 RATIO (ref 0–4.4)
CO2 SERPL-SCNC: 28 MMOL/L (ref 20–29)
CREAT SERPL-MCNC: 0.74 MG/DL (ref 0.57–1)
CREAT UR-MCNC: 128.9 MG/DL
EGFR: 92 ML/MIN/1.73
EOSINOPHIL # BLD AUTO: 0.1 X10E3/UL (ref 0–0.4)
EOSINOPHIL NFR BLD AUTO: 2 %
ERYTHROCYTE [DISTWIDTH] IN BLOOD BY AUTOMATED COUNT: 12.6 % (ref 11.7–15.4)
EST. AVERAGE GLUCOSE BLD GHB EST-MCNC: 146 MG/DL
GLOBULIN SER-MCNC: 2.2 G/DL (ref 1.5–4.5)
GLUCOSE SERPL-MCNC: 115 MG/DL (ref 70–99)
HBA1C MFR BLD: 6.7 % (ref 4.8–5.6)
HCT VFR BLD AUTO: 45.4 % (ref 34–46.6)
HDLC SERPL-MCNC: 39 MG/DL
HGB BLD-MCNC: 15.3 G/DL (ref 11.1–15.9)
IMM GRANULOCYTES # BLD: 0 X10E3/UL (ref 0–0.1)
IMM GRANULOCYTES NFR BLD: 0 %
LDLC SERPL CALC-MCNC: 67 MG/DL (ref 0–99)
LYMPHOCYTES # BLD AUTO: 3.4 X10E3/UL (ref 0.7–3.1)
LYMPHOCYTES NFR BLD AUTO: 48 %
MCH RBC QN AUTO: 31.4 PG (ref 26.6–33)
MCHC RBC AUTO-ENTMCNC: 33.7 G/DL (ref 31.5–35.7)
MCV RBC AUTO: 93 FL (ref 79–97)
MICROALBUMIN UR-MCNC: 8.7 UG/ML
MONOCYTES # BLD AUTO: 0.6 X10E3/UL (ref 0.1–0.9)
MONOCYTES NFR BLD AUTO: 8 %
NEUTROPHILS # BLD AUTO: 3 X10E3/UL (ref 1.4–7)
NEUTROPHILS NFR BLD AUTO: 42 %
PLATELET # BLD AUTO: 197 X10E3/UL (ref 150–450)
POTASSIUM SERPL-SCNC: 4.5 MMOL/L (ref 3.5–5.2)
PROT SERPL-MCNC: 6.9 G/DL (ref 6–8.5)
RBC # BLD AUTO: 4.87 X10E6/UL (ref 3.77–5.28)
SL AMB VLDL CHOLESTEROL CALC: 32 MG/DL (ref 5–40)
SODIUM SERPL-SCNC: 138 MMOL/L (ref 134–144)
T4 FREE SERPL-MCNC: 1.54 NG/DL (ref 0.82–1.77)
TRIGL SERPL-MCNC: 192 MG/DL (ref 0–149)
TSH SERPL DL<=0.005 MIU/L-ACNC: 2.76 UIU/ML (ref 0.45–4.5)
WBC # BLD AUTO: 7.1 X10E3/UL (ref 3.4–10.8)

## 2023-05-25 ENCOUNTER — OFFICE VISIT (OUTPATIENT)
Dept: FAMILY MEDICINE CLINIC | Facility: HOSPITAL | Age: 63
End: 2023-05-25

## 2023-05-25 ENCOUNTER — OFFICE VISIT (OUTPATIENT)
Dept: ENDOCRINOLOGY | Facility: HOSPITAL | Age: 63
End: 2023-05-25

## 2023-05-25 VITALS
BODY MASS INDEX: 46.51 KG/M2 | HEART RATE: 90 BPM | HEIGHT: 64 IN | DIASTOLIC BLOOD PRESSURE: 82 MMHG | SYSTOLIC BLOOD PRESSURE: 122 MMHG | WEIGHT: 272.4 LBS | TEMPERATURE: 97.7 F

## 2023-05-25 VITALS
BODY MASS INDEX: 45.93 KG/M2 | WEIGHT: 269 LBS | DIASTOLIC BLOOD PRESSURE: 80 MMHG | SYSTOLIC BLOOD PRESSURE: 120 MMHG | HEIGHT: 64 IN | HEART RATE: 89 BPM

## 2023-05-25 DIAGNOSIS — E06.3 HYPOTHYROIDISM DUE TO HASHIMOTO'S THYROIDITIS: ICD-10-CM

## 2023-05-25 DIAGNOSIS — E03.8 HYPOTHYROIDISM DUE TO HASHIMOTO'S THYROIDITIS: ICD-10-CM

## 2023-05-25 DIAGNOSIS — E11.9 TYPE 2 DIABETES MELLITUS WITHOUT COMPLICATION, WITHOUT LONG-TERM CURRENT USE OF INSULIN (HCC): Primary | ICD-10-CM

## 2023-05-25 DIAGNOSIS — E28.2 POLYCYSTIC OVARIAN SYNDROME: ICD-10-CM

## 2023-05-25 DIAGNOSIS — E78.2 MIXED HYPERLIPIDEMIA: ICD-10-CM

## 2023-05-25 DIAGNOSIS — I10 PRIMARY HYPERTENSION: ICD-10-CM

## 2023-05-25 DIAGNOSIS — E11.42 DIABETIC POLYNEUROPATHY ASSOCIATED WITH TYPE 2 DIABETES MELLITUS (HCC): ICD-10-CM

## 2023-05-25 DIAGNOSIS — L65.9 HAIR LOSS: ICD-10-CM

## 2023-05-25 DIAGNOSIS — L68.0 HIRSUTISM: ICD-10-CM

## 2023-05-25 NOTE — PROGRESS NOTES
Name: Gurwinder Pineda      : 1960      MRN: [id number]  Encounter Provider: JAYSON Haley  Encounter Date: 2023   Encounter department: [facility] [facility]  Type 2 diabetes mellitus without complication, without long-term current use of insulin ([facility])  Assessment & Plan:    Lab Results   Component Value Date    HGBA1C 6 7 (H) 2023     Improved/well controlled  Continue same meds as rx'd   Continue ARB daily  IRIS exam refused today - will update at next appt  Maintain f/u with endocrine as planned      2  Diabetic polyneuropathy associated with type 2 diabetes mellitus [facility])  Assessment & Plan:    Lab Results   Component Value Date    HGBA1C 6 7 (H) 2023     Well controlled as managed by endocrine  Foot exam updated in office today      3  Primary hypertension  Assessment & Plan:  BP well controlled  Continue same losartan as rx'd  Return in 6 months for follow up      4  Hypothyroidism due to Hashimoto's thyroiditis  Assessment & Plan:  Endocrine f/u updated today  Continue same levothyroxine as recommended      5  Mixed hyperlipidemia  Assessment & Plan:  Compliant w/daily statin - continue as rx'd by endocrine      Update FOBT as previously ordered       Subjective      States she has been well but believes she had covid over the winter  Her neighbor had covid at the same time  Was ill for about 10 days and wasn't able to taste anything  Took about a month to feel like herself again  She is back together with her ex- again  Updated endocrine appt before this appt  Labs updated recently per Dr Lin Hardy  Review of Systems   Constitutional: Negative  Respiratory: Negative  Cardiovascular: Negative  Skin: Positive for rash (dry, itchy, bumpy rash on both arms a month ago, applied cortisone w/o relief, resolved on its own)  Psychiatric/Behavioral: Negative          Current Outpatient Medications on File Prior to "Visit   Medication Sig   • betamethasone dipropionate (DIPROSONE) 0 05 % cream apply topically to affected area twice a day   • cholecalciferol (VITAMIN D3) 1,000 units tablet Take by mouth daily     • FLUoxetine (PROzac) 40 MG capsule take 1 capsule by mouth once daily   • levothyroxine 112 mcg tablet Take 1 tablet (112 mcg total) by mouth daily   • losartan (COZAAR) 100 MG tablet take 1 tablet by mouth once daily   • metFORMIN (GLUCOPHAGE) 500 mg tablet Take 2 tablets (1,000 mg total) by mouth 2 (two) times a day with meals   • MULTIPLE VITAMIN PO Take by mouth daily     • Naproxen Sodium (ALEVE PO) Take by mouth   • QUEtiapine (SEROquel) 50 mg tablet Take 1 tablet (50 mg total) by mouth daily at bedtime   • simvastatin (ZOCOR) 20 mg tablet Take 1 tablet (20 mg total) by mouth daily at bedtime   • spironolactone (ALDACTONE) 50 mg tablet Take 1 tablet (50 mg total) by mouth 2 (two) times a day   • tolterodine (DETROL LA) 4 mg 24 hr capsule Take 1 capsule (4 mg total) by mouth daily       Objective     /82   Pulse 90   Temp 97 7 °F (36 5 °C)   Ht 5' 4\" (1 626 m)   Wt 124 kg (272 lb 6 4 oz)   BMI 46 76 kg/m²       Physical Exam  Vitals reviewed  Constitutional:       General: She is not in acute distress  Appearance: Normal appearance  She is obese  HENT:      Head: Normocephalic  Eyes:      General: No scleral icterus  Neck:      Thyroid: Thyromegaly (smooth) present  Cardiovascular:      Rate and Rhythm: Normal rate and regular rhythm  Pulses: Pulses are weak  Dorsalis pedis pulses are 1+ on the right side and 1+ on the left side  Posterior tibial pulses are 1+ on the right side and 1+ on the left side  Heart sounds: No murmur heard  Pulmonary:      Effort: Pulmonary effort is normal  No respiratory distress  Breath sounds: Normal breath sounds  Musculoskeletal:      Cervical back: Normal range of motion  Right lower leg: No edema        Left lower leg: " No edema  Feet:    Feet:      Right foot:      Skin integrity: No ulcer, skin breakdown, erythema, warmth, callus or dry skin  Left foot:      Skin integrity: Callus (inner heel) present  Lymphadenopathy:      Cervical: No cervical adenopathy  Skin:     General: Skin is warm and dry  Neurological:      General: No focal deficit present  Mental Status: She is alert and oriented to person, place, and time  Psychiatric:         Mood and Affect: Mood normal          Behavior: Behavior normal          Thought Content: Thought content normal          Judgment: Judgment normal           Patient's shoes and socks removed  Right Foot/Ankle   Right Foot Inspection  Skin Exam: skin normal and skin intact  No dry skin, no warmth, no callus, no erythema, no maceration, no abnormal color, no pre-ulcer, no ulcer and no callus  Toe Exam: ROM and strength within normal limits  Sensory   Vibration: intact  Proprioception: intact  Monofilament testing: diminished    Vascular  The right DP pulse is 1+  The right PT pulse is 1+  Right Toe  - Comprehensive Exam  Ecchymosis: none  Arch: normal  Hammertoes: absent  Claw Toes: absent  Swelling: none   Tenderness: none         Left Foot/Ankle  Left Foot Inspection  Skin Exam: callus (inner heel)  Toe Exam: ROM and strength within normal limits  Sensory   Vibration: intact  Proprioception: intact  Monofilament testing: diminished    Vascular  The left DP pulse is 1+  The left PT pulse is 1+       Left Toe  - Comprehensive Exam  Ecchymosis: none  Arch: normal  Hammertoes: absent  Claw toes: absent  Swelling: none   Tenderness: none           Assign Risk Category  No deformity present  Loss of protective sensation  Weak pulses  Risk: [address], CRNP

## 2023-05-25 NOTE — ASSESSMENT & PLAN NOTE
Lab Results   Component Value Date    HGBA1C 6 7 (H) 05/09/2023     Well controlled as managed by endocrine  Foot exam updated in office today

## 2023-05-25 NOTE — PROGRESS NOTES
5/26/2023    Assessment/Plan      Diagnoses and all orders for this visit:    Type 2 diabetes mellitus without complication, without long-term current use of insulin ([place] [id number] )  -     HEMOGLOBIN A1C W/ EAG ESTIMATION Lab Collect; Future  -     Comprehensive metabolic panel Lab Collect; Future  -     TSH, 3rd generation Lab Collect; Future  -     T4, free Lab Collect; Future  -     HEMOGLOBIN A1C W/ EAG ESTIMATION Lab Collect  -     Comprehensive metabolic panel Lab Collect  -     TSH, 3rd generation Lab Collect  -     T4, free Lab Collect    Diabetic polyneuropathy associated with type 2 diabetes mellitus ([place] [address] )  -     HEMOGLOBIN A1C W/ EAG ESTIMATION Lab Collect; Future  -     Comprehensive metabolic panel Lab Collect; Future  -     TSH, 3rd generation Lab Collect; Future  -     T4, free Lab Collect; Future  -     HEMOGLOBIN A1C W/ EAG ESTIMATION Lab Collect  -     Comprehensive metabolic panel Lab Collect  -     TSH, 3rd generation Lab Collect  -     T4, free Lab Collect    Hypothyroidism due to Hashimoto's thyroiditis  -     HEMOGLOBIN A1C W/ EAG ESTIMATION Lab Collect; Future  -     Comprehensive metabolic panel Lab Collect; Future  -     TSH, 3rd generation Lab Collect; Future  -     T4, free Lab Collect; Future  -     HEMOGLOBIN A1C W/ EAG ESTIMATION Lab Collect  -     Comprehensive metabolic panel Lab Collect  -     TSH, 3rd generation Lab Collect  -     T4, free Lab Collect    Polycystic ovarian syndrome  -     HEMOGLOBIN A1C W/ EAG ESTIMATION Lab Collect; Future  -     Comprehensive metabolic panel Lab Collect; Future  -     TSH, 3rd generation Lab Collect; Future  -     T4, free Lab Collect; Future  -     HEMOGLOBIN A1C W/ EAG ESTIMATION Lab Collect  -     Comprehensive metabolic panel Lab Collect  -     TSH, 3rd generation Lab Collect  -     T4, free Lab Collect    Primary hypertension  -     HEMOGLOBIN A1C W/ EAG ESTIMATION Lab Collect; Future  -     Comprehensive metabolic panel Lab Collect;  Future  - TSH, 3rd generation Lab Collect; Future  -     T4, free Lab Collect; Future  -     HEMOGLOBIN A1C W/ EAG ESTIMATION Lab Collect  -     Comprehensive metabolic panel Lab Collect  -     TSH, 3rd generation Lab Collect  -     T4, free Lab Collect    Mixed hyperlipidemia  -     HEMOGLOBIN A1C W/ EAG ESTIMATION Lab Collect; Future  -     Comprehensive metabolic panel Lab Collect; Future  -     TSH, 3rd generation Lab Collect; Future  -     T4, free Lab Collect; Future  -     HEMOGLOBIN A1C W/ EAG ESTIMATION Lab Collect  -     Comprehensive metabolic panel Lab Collect  -     TSH, 3rd generation Lab Collect  -     T4, free Lab Collect    Hirsutism  -     HEMOGLOBIN A1C W/ EAG ESTIMATION Lab Collect; Future  -     Comprehensive metabolic panel Lab Collect; Future  -     TSH, 3rd generation Lab Collect; Future  -     T4, free Lab Collect; Future  -     HEMOGLOBIN A1C W/ EAG ESTIMATION Lab Collect  -     Comprehensive metabolic panel Lab Collect  -     TSH, 3rd generation Lab Collect  -     T4, free Lab Collect    Hair loss  -     HEMOGLOBIN A1C W/ EAG ESTIMATION Lab Collect; Future  -     Comprehensive metabolic panel Lab Collect; Future  -     TSH, 3rd generation Lab Collect; Future  -     T4, free Lab Collect; Future  -     HEMOGLOBIN A1C W/ EAG ESTIMATION Lab Collect  -     Comprehensive metabolic panel Lab Collect  -     TSH, 3rd generation Lab Collect  -     T4, free Lab Collect        Assessment/Plan:  1  Type 2 diabetes  Hemoglobin A1c is 6 7%  This does demonstrate excellent control of her diabetes  She will continue the same metformin 1000 mg twice daily  She will continue to eat healthy and try to get some exercise in her regimen  She is due for diabetic eye exam   She was recommended to get her diabetic eye exam performed  She of note has a urine microalbumin to creatinine ratio that shows no effects of diabetes on the kidneys  2   Diabetic neuropathy  She denies neuropathic symptoms    Diabetic foot exams are up-to-date  She does see podiatry regularly  3   Polycystic ovary syndrome  She remains on metformin and continues to work on dietary changes  She has started to lose a little bit of weight  4   Hypothyroidism due to Hashimoto's thyroiditis  Most recent thyroid function studies are normal signifying biochemical euthyroidism  She will continue the same levothyroxine 112 mcg daily  5   Hypertension  She is normotensive in the office on her current dose of losartan and spironolactone  6   Hyperlipidemia  Lipid profile is good  She will continue the same simvastatin 20 mg daily  7   Hirsutism  She has hirsutism with her polycystic ovary syndrome and will continue the same spironolactone 50 mg twice daily  The hirsutism is stable  I have asked her to follow-up in 6 months with preceding hemoglobin A1c, CMP, TSH, and free T4       CC: Diabetes type II, thyroid, PCOS, blood pressure, lipid follow-up    History of Present Illness     HPI: William Lynn is a 65y o  year old female with type 2 diabetes with neuropathy for 6 years, hypothyroid, PCOS, hypertension, hyperlipidemia for follow-up visit  She is on oral agents at home and takes metformin 1000 mg twice daily  She denies any polyphagia, polydipsia, nocturia and blurry vision  She has some polyuria  She denies numbness or tingling of the feet  She denies chest pain or shortness of breath  She denies nephropathy, retinopathy, heart attack, stroke and claudication but does admit to neuropathy  Hypoglycemic episodes: No never  The patient's last eye exam was in June 2020 with no retinopathy  The patient's last foot exam was in June 2022  She does see podiatry once a year  Last A1C was   Lab Results   Component Value Date    HGBA1C 6 7 (H) 05/09/2023     Blood Sugar/Glucometer/Pump/CGM review: Does not check blood sugars  She has hypothyroidism due to Hashimoto's thyroiditis and takes levothyroxine 112 mcg daily  She denies heat or cold intolerance, palpitation, tremors, diarrhea or constipation, insomnia, or depression  She does have some low energy and fatigue  Weight is 6 pounds less than last visit  She has rare anxiety at night  She has some dry skin of the feet and brittle nails along with hair loss which is slowly worsening but her hirsutism is unchanged  She has PCOS and takes metformin  She does have some hirsutism  She also takes spironolactone 50 mg twice daily  She has hyperlipidemia and takes simvastatin 20 mg daily  She denies chest pain or shortness of breath  She is hypertension and takes losartan 100 mg daily  She denies headache or strokelike symptoms  Review of Systems   Constitutional: Positive for fatigue  Negative for unexpected weight change  Weight 6 lbs less than last visit  Admits some low energy  HENT: Negative for trouble swallowing  Eyes: Positive for visual disturbance  Wears glasses  Vision poor in general   Does not have center vision on the left  Respiratory: Negative for chest tightness and shortness of breath  Cardiovascular: Negative for chest pain and palpitations  Gastrointestinal: Negative for abdominal pain, constipation, diarrhea and nausea  Endocrine: Positive for polyuria  Negative for cold intolerance, heat intolerance, polydipsia and polyphagia  Nocturia none  Skin: Negative for rash and wound  Some dry skin of he feet  Has brittle nails  Has hair loss, thinks slowly worsening  Hirsutism unchanged  Neurological: Negative for dizziness, tremors, weakness, light-headedness, numbness and headaches  Psychiatric/Behavioral: Negative for dysphoric mood and sleep disturbance  The patient is nervous/anxious  Rare anxiety at night          Historical Information   Past Medical History:   Diagnosis Date   • Anxiety    • Arthritis     last assessed: June 4, 2012   • Depression    • [facility]     mellitus - Resolved: Nov 18, 2015   • Hashimoto's thyroiditis      Past Surgical History:   Procedure Laterality Date   • CATARACT EXTRACTION Left    • FOOT SURGERY Left     plantars wart removed   • TONSILLECTOMY       Social History   Social History     Substance and Sexual Activity   Alcohol Use No     Social History     Substance and Sexual Activity   Drug Use No     Social History     Tobacco Use   Smoking Status Never   Smokeless Tobacco Never     Family History:   Family History   Problem Relation Age of Onset   • Diabetes Mother         mellitus    • Diabetes type II Mother    • Mental illness Mother    • Esophageal cancer Father    • No Known Problems Maternal Grandmother    • No Known Problems Maternal Grandfather    • No Known Problems Paternal Grandmother    • No Known Problems Paternal Grandfather    • No Known Problems Brother    • No Known Problems Brother    • No Known Problems Brother    • Cancer Maternal Aunt         unknown primary   • No Known Problems Maternal Aunt    • No Known Problems Paternal Aunt    • No Known Problems Paternal Aunt    • No Known Problems Paternal Aunt        Meds/Allergies   Current Outpatient Medications   Medication Sig Dispense Refill   • betamethasone dipropionate (DIPROSONE) 0 05 % cream apply topically to affected area twice a day 45 g 0   • cholecalciferol (VITAMIN D3) 1,000 units tablet Take by mouth daily       • FLUoxetine (PROzac) 40 MG capsule take 1 capsule by mouth once daily 30 capsule 5   • levothyroxine 112 mcg tablet Take 1 tablet (112 mcg total) by mouth daily 30 tablet 5   • losartan (COZAAR) 100 MG tablet take 1 tablet by mouth once daily 30 tablet 5   • metFORMIN (GLUCOPHAGE) 500 mg tablet Take 2 tablets (1,000 mg total) by mouth 2 (two) times a day with meals 120 tablet 5   • MULTIPLE VITAMIN PO Take by mouth daily       • Naproxen Sodium (ALEVE PO) Take by mouth     • QUEtiapine (SEROquel) 50 mg tablet Take 1 tablet (50 mg total) by mouth daily at bedtime 30 "tablet 5   • simvastatin (ZOCOR) 20 mg tablet Take 1 tablet (20 mg total) by mouth daily at bedtime 90 tablet 2   • spironolactone (ALDACTONE) 50 mg tablet Take 1 tablet (50 mg total) by mouth 2 (two) times a day 180 tablet 2   • tolterodine (DETROL LA) 4 mg 24 hr capsule Take 1 capsule (4 mg total) by mouth daily 90 capsule 3     No current facility-administered medications for this visit  Allergies   Allergen Reactions   • Lisinopril Cough     Reaction Date: [id number];    • Losartan Eye Swelling       Objective   Vitals: Blood pressure 120/80, pulse 89, height 5' 4\" (1 626 m), weight 122 kg (269 lb)  Invasive Devices     None                 Physical Exam  Vitals reviewed  Constitutional:       Appearance: Normal appearance  She is well-developed  She is obese  HENT:      Head: Normocephalic and atraumatic  Eyes:      Extraocular Movements: Extraocular movements intact  Conjunctiva/sclera: Conjunctivae normal       Comments: No lid lag, stare, proptosis, or periorbital edema  Neck:      Thyroid: No thyromegaly  Vascular: No carotid bruit  Comments: Thyroid normal in size  Cardiovascular:      Rate and Rhythm: Normal rate and regular rhythm  Pulses: Normal pulses  no weak pulses          Dorsalis pedis pulses are 2+ on the right side and 2+ on the left side  Posterior tibial pulses are 2+ on the right side and 2+ on the left side  Heart sounds: Normal heart sounds  No murmur heard  Pulmonary:      Effort: Pulmonary effort is normal       Breath sounds: Normal breath sounds  No wheezing  Abdominal:      Palpations: Abdomen is soft  Musculoskeletal:         General: No deformity  Cervical back: Normal range of motion and neck supple  Right lower leg: Edema present  Left lower leg: Edema present  Comments: Trace bilateral lower extremity edema, left greater than right  Callus on the heels, especially medial left foot    No tremor of the outstretched " hands     Feet:      Right foot:      Skin integrity: No ulcer, skin breakdown, erythema, warmth, callus or dry skin  Left foot:      Skin integrity: No ulcer, skin breakdown, erythema, warmth, callus or dry skin  Lymphadenopathy:      Cervical: No cervical adenopathy  Skin:     General: Skin is warm and dry  Findings: No rash  Neurological:      Mental Status: She is alert and oriented to person, place, and time  Deep Tendon Reflexes: Reflexes are normal and symmetric  Comments: Vibration sensation diminished to the first toe DIP joint bilaterally  Microfilament sensation intact bilaterally except to the heels  Patient's shoes and socks removed  Right Foot/Ankle   Right Foot Inspection  Skin Exam: skin normal and skin intact  No dry skin, no warmth, no callus, no erythema, no maceration, no abnormal color, no pre-ulcer, no ulcer and no callus  Toe Exam: swelling  no right toe deformity    Sensory   Vibration: diminished  Monofilament testing: intact    Vascular  Capillary refills: < 3 seconds  The right DP pulse is 2+  The right PT pulse is 2+  Left Foot/Ankle  Left Foot Inspection  Skin Exam: skin normal and skin intact  No dry skin, no warmth, no erythema, no maceration, normal color, no pre-ulcer, no ulcer and no callus  Toe Exam: swelling  No left toe deformity  Sensory   Vibration: diminished  Monofilament testing: intact    Vascular  Capillary refills: < 3 seconds  The left DP pulse is 2+  The left PT pulse is 2+  Assign Risk Category  No deformity present  Loss of protective sensation  No weak pulses  Risk: 1        The history was obtained from the review of the chart and from the patient      Lab Results:    Most recent Alc is  Lab Results   Component Value Date    HGBA1C 6 7 (H) 05/09/2023           Blood work performed on 5/9/2023 showed a CMP with a glucose of 115 fasting but was otherwise normal     CBC is normal     Urine microalbumin to creatinine ratio was 7  Lab Results   Component Value Date    BUN 11 05/09/2023    CL 96 05/09/2023    CO2 28 05/09/2023    CREATININE 0 74 05/09/2023    CREATININE 0 74 11/09/2022    CREATININE 0 70 05/13/2022    K 4 5 05/09/2023     08/24/2017     eGFR   Date Value Ref Range Status   05/09/2023 92 >59 mL/min/1 73 Final       Total cholesterol 138, LDL cholesterol 67    Lab Results   Component Value Date    CHOL 166 08/24/2017    CHOLHDL 3 5 05/09/2023    HDL 39 (L) 05/09/2023    TRIG 192 (H) 05/09/2023       Lab Results   Component Value Date    ALKPHOS 58 08/24/2017    ALT 27 05/09/2023    AST 38 05/09/2023    BILITOT 0 2 08/24/2017       Lab Results   Component Value Date    FREET4 1 54 05/09/2023    TSH 2 760 05/09/2023             Future Appointments   Date Time Provider Mitzi Ackerman   7/13/2023  1:00 PM Agip U  [address] , JAYSON URO [facility]   11/20/2023  2:00 PM Jessika Alcocer MD [facility]   11/20/2023  3:20 PM JAYSON Goldstein [facility]

## 2023-05-25 NOTE — ASSESSMENT & PLAN NOTE
Lab Results   Component Value Date    HGBA1C 6 7 (H) 05/09/2023     Improved/well controlled  Continue same meds as rx'd   Continue ARB daily  IRIS exam refused today - will update at next appt  Maintain f/u with endocrine as planned

## 2023-05-31 DIAGNOSIS — F33.42 RECURRENT MAJOR DEPRESSIVE DISORDER, IN FULL REMISSION (HCC): ICD-10-CM

## 2023-06-02 RX ORDER — QUETIAPINE FUMARATE 50 MG/1
TABLET, FILM COATED ORAL
Qty: 30 TABLET | Refills: 5 | Status: SHIPPED | OUTPATIENT
Start: 2023-06-02

## 2023-06-26 DIAGNOSIS — E03.9 ACQUIRED HYPOTHYROIDISM: ICD-10-CM

## 2023-06-27 RX ORDER — LEVOTHYROXINE SODIUM 112 UG/1
TABLET ORAL
Qty: 30 TABLET | Refills: 5 | Status: SHIPPED | OUTPATIENT
Start: 2023-06-27

## 2023-06-29 DIAGNOSIS — F33.42 RECURRENT MAJOR DEPRESSIVE DISORDER, IN FULL REMISSION (HCC): ICD-10-CM

## 2023-06-29 DIAGNOSIS — I10 ESSENTIAL HYPERTENSION: ICD-10-CM

## 2023-06-29 RX ORDER — FLUOXETINE HYDROCHLORIDE 40 MG/1
CAPSULE ORAL
Qty: 30 CAPSULE | Refills: 5 | Status: SHIPPED | OUTPATIENT
Start: 2023-06-29

## 2023-06-29 RX ORDER — LOSARTAN POTASSIUM 100 MG/1
TABLET ORAL
Qty: 30 TABLET | Refills: 5 | Status: SHIPPED | OUTPATIENT
Start: 2023-06-29

## 2023-07-13 ENCOUNTER — OFFICE VISIT (OUTPATIENT)
Dept: UROLOGY | Facility: HOSPITAL | Age: 63
End: 2023-07-13
Payer: COMMERCIAL

## 2023-07-13 VITALS
WEIGHT: 270 LBS | SYSTOLIC BLOOD PRESSURE: 98 MMHG | HEART RATE: 89 BPM | DIASTOLIC BLOOD PRESSURE: 72 MMHG | BODY MASS INDEX: 46.35 KG/M2 | OXYGEN SATURATION: 96 %

## 2023-07-13 DIAGNOSIS — N39.46 MIXED STRESS AND URGE URINARY INCONTINENCE: ICD-10-CM

## 2023-07-13 DIAGNOSIS — N32.81 OAB (OVERACTIVE BLADDER): Primary | ICD-10-CM

## 2023-07-13 PROCEDURE — 99213 OFFICE O/P EST LOW 20 MIN: CPT | Performed by: NURSE PRACTITIONER

## 2023-07-13 NOTE — PROGRESS NOTES
7/13/2023    Monika Flanagan  1960  [id number]        Assessment  -Overactive bladder  -Mixed urinary incontinence    Discussion/Plan  Ashwin Harris is a 65 y.o. female being managed by our office    1. Overactive bladder, mixed urinary incontinence- we discussed patient's urinary symptoms as noted below. Reviewed dietary and behavioral modifications. Would recommend discontinuing Detrol and trialing a different medication such as Myrbetriq as she expresses interest in [facility]. We will try Myrbetriq first secondary to cost.  Prescription for Myrbetriq 25 mg sent to her pharmacy. Again, she is unable to proceed with pelvic floor physical therapy secondary to issues with transportation. Follow-up in 3 months to reevaluate her urinary symptoms and perform PVR assessment. Patient was advised to call sooner with any questions or issues.    -All questions answered, patient agree with plan     History of Present Illness  65 y.o. female with a history of OAB and mixed urinary incontinence presents today for follow up. Patient last seen in the office in May 2022. She remains on Detrol 4 mg daily. Unfortunately, she reports increased urinary symptoms of frequency, urgency, and occasional urge incontinence. She states her symptoms are inconsistent and do not occur every day. Patient is unsure if this is secondary to her diet or physical activity. She denies any gross hematuria or dysuria. No changes to her overall health. Review of Systems  Review of Systems   Constitutional: Negative. HENT: Negative. Respiratory: Negative. Cardiovascular: Negative. Gastrointestinal: Negative. Genitourinary: Positive for frequency and urgency. Negative for difficulty urinating, dysuria, flank pain and hematuria. Musculoskeletal: Negative. Skin: Negative. Neurological: Negative. Psychiatric/Behavioral: Negative.         Past Medical History  Past Medical History:   Diagnosis Date   • Anxiety    • Arthritis last assessed: June 4, 2012   • Depression    • Diabetes [facility])     mellitus - Resolved: Nov 18, 2015   • Hashimoto's thyroiditis        Past Social History  Past Surgical History:   Procedure Laterality Date   • CATARACT EXTRACTION Left    • FOOT SURGERY Left     plantars wart removed   • TONSILLECTOMY         Past Family History  Family History   Problem Relation Age of Onset   • Diabetes Mother         mellitus    • Diabetes type II Mother    • Mental illness Mother    • Esophageal cancer Father    • No Known Problems Maternal Grandmother    • No Known Problems Maternal Grandfather    • No Known Problems Paternal Grandmother    • No Known Problems Paternal Grandfather    • No Known Problems Brother    • No Known Problems Brother    • No Known Problems Brother    • Cancer Maternal Aunt         unknown primary   • No Known Problems Maternal Aunt    • No Known Problems Paternal Aunt    • No Known Problems Paternal Aunt    • No Known Problems Paternal Aunt        Past Social history  Social History     Socioeconomic History   • Marital status:      Spouse name: Not on file   • Number of children: Not on file   • Years of education: Not on file   • Highest education level: Not on file   Occupational History   • Not on file   Tobacco Use   • Smoking status: Never   • Smokeless tobacco: Never   Vaping Use   • Vaping Use: Never used   Substance and Sexual Activity   • Alcohol use: No   • Drug use: No   • Sexual activity: Not on file   Other Topics Concern   • Not on file   Social History Narrative    Lives alone without help available     Martial history:      Travels by public transportation      Social Determinants of Health     Financial Resource Strain: Not on file   Food Insecurity: Not on file   Transportation Needs: Not on file   Physical Activity: Not on file   Stress: Not on file   Social Connections: Not on file   Intimate Partner Violence: Not on file   Housing Stability: Not on file Current Medications  Current Outpatient Medications   Medication Sig Dispense Refill   • betamethasone dipropionate (DIPROSONE) 0.05 % cream apply topically to affected area twice a day 45 g 0   • cholecalciferol (VITAMIN D3) 1,000 units tablet Take by mouth daily       • FLUoxetine (PROzac) 40 MG capsule take 1 capsule by mouth once daily 30 capsule 5   • levothyroxine 112 mcg tablet take 1 tablet by mouth once daily 30 tablet 5   • losartan (COZAAR) 100 MG tablet take 1 tablet by mouth once daily 30 tablet 5   • metFORMIN (GLUCOPHAGE) 500 mg tablet take 2 tablets by mouth twice a day with meals 360 tablet 1   • MULTIPLE VITAMIN PO Take by mouth daily       • Naproxen Sodium (ALEVE PO) Take by mouth     • QUEtiapine (SEROquel) 50 mg tablet take 1 tablet by mouth daily at bedtime 30 tablet 5   • simvastatin (ZOCOR) 20 mg tablet Take 1 tablet (20 mg total) by mouth daily at bedtime 90 tablet 2   • spironolactone (ALDACTONE) 50 mg tablet take 1 tablet by mouth twice a day 180 tablet 1   • tolterodine (DETROL LA) 4 mg 24 hr capsule Take 1 capsule (4 mg total) by mouth daily 90 capsule 3     No current facility-administered medications for this visit. Allergies  Allergies   Allergen Reactions   • Lisinopril Cough     Reaction Date: [id number];    • Losartan Eye Swelling       Past medical history, social history, family history, medications and allergies were reviewed. Vitals  There were no vitals filed for this visit. Physical Exam  Physical Exam  Constitutional:       Appearance: Normal appearance. She is well-developed. She is obese. HENT:      Head: Normocephalic. Eyes:      Pupils: Pupils are equal, round, and reactive to light. Pulmonary:      Effort: Pulmonary effort is normal.   Abdominal:      Palpations: Abdomen is soft. Musculoskeletal:         General: Normal range of motion. Cervical back: Normal range of motion. Skin:     General: Skin is warm and dry.    Neurological: General: No focal deficit present. Mental Status: She is alert and oriented to person, place, and time. Psychiatric:         Mood and Affect: Mood normal.         Behavior: Behavior normal.         Thought Content: Thought content normal.         Judgment: Judgment normal.         Results    I have personally reviewed all pertinent lab results and reviewed with patient  Lab Results   Component Value Date    GLUCOSE 119 (H) 08/24/2017    CALCIUM 9.8 08/24/2017     08/24/2017    K 4.5 05/09/2023    CO2 28 05/09/2023    CL 96 05/09/2023    BUN 11 05/09/2023    CREATININE 0.74 05/09/2023     Lab Results   Component Value Date    WBC 7.1 05/09/2023    HGB 15.3 05/09/2023    HCT 45.4 05/09/2023    MCV 93 05/09/2023     05/09/2023     No results found for this or any previous visit (from the past 1 hour(s)).

## 2023-07-13 NOTE — PATIENT INSTRUCTIONS
BLADDER HEALTH    WHAT IS CONSIDERED NORMAL? The average bladder can hold about 2 cups of urine before it needs to be emptied. The normal range of voiding urine is 6 to 8 times during a 24 hour period. As we get older, our bladder capacity can get smaller and we may need to pass urine more frequently but usually not more than every 2 hours. Urine should flow easily without discomfort in a good, steady stream until the bladder is empty. No pushing or straining is necessary to empty the bladder. An urge is a signal that you feel as the bladder stretches to fill with urine. Urges can be felt even if the bladder is not full. Urges are not commands to go to the toilet, merely a signal and can be controlled. WHAT ARE GOOD BLADDER HABITS? Take your time when emptying your bladder. Don’t strain or push to empty your bladder. Make sure you empty your bladder completely each time you pass urine. Do not rush the process. Consistently ignoring the urge to go (waiting more than 4 hours between toileting) or urinating too infrequently may be convenient but not healthy for your bladder. Avoid going to the toilet “just in case” or more often than every 2 hours. It is usually not necessary to go when you feel the first urge. Try to go only when your bladder is full. Urgency and frequency of urination can be improved by retraining the bladder and spacing your fluid intake throughout the day. Practice good toilet habits. Don’t let your bladder control your life. TIPS TO MAINTAIN GOOD BLADDER HABITS  Maintain a good fluid intake. Depending on your body size and environment, drink 6 -8 cups (8 oz each) of fluid per day unless otherwise advised by your doctor. Not enough fluid creates a foul odor and dark color of the urine. Limit the amount of caffeine (coffee, cola, chocolate or tea) and citrus foods that you consume as these foods can be associated with increased sensation of urinary urgency and frequency. Limit the amount of alcohol you drink. Alcohol increases urine production and makes it difficult for the brain to coordinate bladder control. Avoid constipation by maintaining a balanced diet of dietary fiber. Cigarette smoking is also irritating to the bladder surface and is associated with bladder cancer. In addition, the coughing associated with smoking may lead to increased incontinent episodes because of the increased pressure. HOW DIET CAN AFFECT YOUR BLADDER  Although there is no particular "diet" that can cure bladder control, there are certain dietary suggestions you can use to help control the problem. There are 2 points to consider when evaluating how your habits and diet may affect your bladder:    Foods and fluids can irritate the bladder  Some foods and beverages are thought to contribute to bladder leakage and irritability. However their effect on the bladder is not completely understood and you may want to see if eliminating one or all of these items improves your bladder control. If you are unable to give them up completely, it is recommended that you use the following items in moderation:  Acidic beverages and foods (orange juice, grapefruit juice, lemonade etc)  Alcoholic beverages  Vinegar  Coffee (regular and decaf)  Tea (regular and decaf)  Caffeinated beverages  Carbonated beverages          Drinking enough and the right kinds of fluids  Many people with bladder control issues decrease their intake of liquids in hope that they will need to urinate less frequently or have less urinary leakage. You should not restrict fluids to control your bladder. While a decrease in liquid intake does result in a decrease in the volume of urine, the smaller amount of urine may be more highly concentrated. Highly concentrated, dark yellow urine is irritating to the bladder surface and may actually cause you to go to the bathroom more frequently.       It also encourages the growth of bacteria, which may lead to infections resulting in incontinence. Substitutions for Bladder Irritants: water is always the best beverage choice. Grape and apple juice are thirst quenchers are good selections and are not as irritating to the bladder.   Low acid fruits:  Pears, apricots, papaya, watermelon  For coffee drinkers: KAVA®, Postum®, Jama®, Kaffree Linh®  For tea drinkers:  non-citrus or herbal and sun brewed tea

## 2023-11-09 LAB
ALBUMIN SERPL-MCNC: 4.3 G/DL (ref 3.9–4.9)
ALBUMIN/GLOB SERPL: 1.8 {RATIO} (ref 1.2–2.2)
ALP SERPL-CCNC: 58 IU/L (ref 44–121)
ALT SERPL-CCNC: 29 IU/L (ref 0–32)
AST SERPL-CCNC: 39 IU/L (ref 0–40)
BILIRUB SERPL-MCNC: 0.3 MG/DL (ref 0–1.2)
BUN SERPL-MCNC: 11 MG/DL (ref 8–27)
BUN/CREAT SERPL: 17 (ref 12–28)
CALCIUM SERPL-MCNC: 9.8 MG/DL (ref 8.7–10.3)
CHLORIDE SERPL-SCNC: 98 MMOL/L (ref 96–106)
CO2 SERPL-SCNC: 26 MMOL/L (ref 20–29)
CREAT SERPL-MCNC: 0.65 MG/DL (ref 0.57–1)
EGFR: 100 ML/MIN/1.73
EST. AVERAGE GLUCOSE BLD GHB EST-MCNC: 157 MG/DL
GLOBULIN SER-MCNC: 2.4 G/DL (ref 1.5–4.5)
GLUCOSE SERPL-MCNC: 131 MG/DL (ref 70–99)
HBA1C MFR BLD: 7.1 % (ref 4.8–5.6)
POTASSIUM SERPL-SCNC: 4.4 MMOL/L (ref 3.5–5.2)
PROT SERPL-MCNC: 6.7 G/DL (ref 6–8.5)
SODIUM SERPL-SCNC: 138 MMOL/L (ref 134–144)
T4 FREE SERPL-MCNC: 1.48 NG/DL (ref 0.82–1.77)
TSH SERPL DL<=0.005 MIU/L-ACNC: 1.13 UIU/ML (ref 0.45–4.5)

## 2023-11-20 ENCOUNTER — OFFICE VISIT (OUTPATIENT)
Dept: ENDOCRINOLOGY | Facility: HOSPITAL | Age: 63
End: 2023-11-20
Payer: COMMERCIAL

## 2023-11-20 ENCOUNTER — OFFICE VISIT (OUTPATIENT)
Dept: FAMILY MEDICINE CLINIC | Facility: HOSPITAL | Age: 63
End: 2023-11-20
Payer: COMMERCIAL

## 2023-11-20 VITALS
WEIGHT: 272 LBS | HEART RATE: 76 BPM | HEIGHT: 64 IN | BODY MASS INDEX: 46.44 KG/M2 | DIASTOLIC BLOOD PRESSURE: 72 MMHG | SYSTOLIC BLOOD PRESSURE: 110 MMHG

## 2023-11-20 VITALS
BODY MASS INDEX: 46.74 KG/M2 | WEIGHT: 273.8 LBS | HEIGHT: 64 IN | TEMPERATURE: 97.6 F | HEART RATE: 74 BPM | SYSTOLIC BLOOD PRESSURE: 112 MMHG | DIASTOLIC BLOOD PRESSURE: 70 MMHG

## 2023-11-20 DIAGNOSIS — Z00.00 ANNUAL PHYSICAL EXAM: Primary | ICD-10-CM

## 2023-11-20 DIAGNOSIS — E78.2 MIXED HYPERLIPIDEMIA: ICD-10-CM

## 2023-11-20 DIAGNOSIS — E06.3 HYPOTHYROIDISM DUE TO HASHIMOTO'S THYROIDITIS: ICD-10-CM

## 2023-11-20 DIAGNOSIS — L68.0 HIRSUTISM: ICD-10-CM

## 2023-11-20 DIAGNOSIS — E11.9 TYPE 2 DIABETES MELLITUS WITHOUT COMPLICATION, WITHOUT LONG-TERM CURRENT USE OF INSULIN (HCC): ICD-10-CM

## 2023-11-20 DIAGNOSIS — E78.5 HYPERLIPIDEMIA, UNSPECIFIED HYPERLIPIDEMIA TYPE: ICD-10-CM

## 2023-11-20 DIAGNOSIS — E28.2 POLYCYSTIC OVARIAN SYNDROME: ICD-10-CM

## 2023-11-20 DIAGNOSIS — I10 ESSENTIAL HYPERTENSION: ICD-10-CM

## 2023-11-20 DIAGNOSIS — F33.42 RECURRENT MAJOR DEPRESSIVE DISORDER, IN FULL REMISSION (HCC): ICD-10-CM

## 2023-11-20 DIAGNOSIS — I10 PRIMARY HYPERTENSION: ICD-10-CM

## 2023-11-20 DIAGNOSIS — Z12.31 SCREENING MAMMOGRAM FOR BREAST CANCER: ICD-10-CM

## 2023-11-20 DIAGNOSIS — E28.2 PCOS (POLYCYSTIC OVARIAN SYNDROME): ICD-10-CM

## 2023-11-20 DIAGNOSIS — E03.8 HYPOTHYROIDISM DUE TO HASHIMOTO'S THYROIDITIS: ICD-10-CM

## 2023-11-20 DIAGNOSIS — E11.9 TYPE 2 DIABETES MELLITUS WITHOUT COMPLICATION, WITHOUT LONG-TERM CURRENT USE OF INSULIN (HCC): Primary | ICD-10-CM

## 2023-11-20 DIAGNOSIS — E03.9 ACQUIRED HYPOTHYROIDISM: ICD-10-CM

## 2023-11-20 DIAGNOSIS — Z23 ENCOUNTER FOR IMMUNIZATION: ICD-10-CM

## 2023-11-20 DIAGNOSIS — E11.42 DIABETIC POLYNEUROPATHY ASSOCIATED WITH TYPE 2 DIABETES MELLITUS (HCC): ICD-10-CM

## 2023-11-20 DIAGNOSIS — E66.01 MORBID OBESITY WITH BMI OF 45.0-49.9, ADULT (HCC): ICD-10-CM

## 2023-11-20 PROCEDURE — 90686 IIV4 VACC NO PRSV 0.5 ML IM: CPT

## 2023-11-20 PROCEDURE — 99396 PREV VISIT EST AGE 40-64: CPT | Performed by: NURSE PRACTITIONER

## 2023-11-20 PROCEDURE — 90471 IMMUNIZATION ADMIN: CPT

## 2023-11-20 PROCEDURE — 99214 OFFICE O/P EST MOD 30 MIN: CPT | Performed by: INTERNAL MEDICINE

## 2023-11-20 RX ORDER — FLUOXETINE HYDROCHLORIDE 40 MG/1
40 CAPSULE ORAL DAILY
Qty: 90 CAPSULE | Refills: 1 | Status: SHIPPED | OUTPATIENT
Start: 2023-11-20

## 2023-11-20 RX ORDER — LEVOTHYROXINE SODIUM 112 UG/1
112 TABLET ORAL DAILY
Qty: 90 TABLET | Refills: 1 | Status: SHIPPED | OUTPATIENT
Start: 2023-11-20

## 2023-11-20 RX ORDER — SPIRONOLACTONE 50 MG/1
50 TABLET, FILM COATED ORAL 2 TIMES DAILY
Qty: 180 TABLET | Refills: 1 | Status: SHIPPED | OUTPATIENT
Start: 2023-11-20

## 2023-11-20 RX ORDER — QUETIAPINE FUMARATE 50 MG/1
50 TABLET, FILM COATED ORAL
Qty: 30 TABLET | Refills: 5 | Status: SHIPPED | OUTPATIENT
Start: 2023-11-20

## 2023-11-20 RX ORDER — SIMVASTATIN 20 MG
20 TABLET ORAL
Qty: 90 TABLET | Refills: 2 | Status: SHIPPED | OUTPATIENT
Start: 2023-11-20

## 2023-11-20 RX ORDER — LOSARTAN POTASSIUM 100 MG/1
100 TABLET ORAL DAILY
Qty: 90 TABLET | Refills: 1 | Status: SHIPPED | OUTPATIENT
Start: 2023-11-20

## 2023-11-20 NOTE — PROGRESS NOTES
[address] [facility]     NAME: Ingrid Helton  AGE: 65 y.o. SEX: female  : 1960     DATE: 2023     Assessment and Plan:     Problem List Items Addressed This Visit       Depression     Mood stable on current meds  #90 day refills issued as requested  Return in 6 months - call sooner w/any mood concerns         Relevant Medications    QUEtiapine (SEROquel) 50 mg tablet    FLUoxetine (PROzac) 40 MG capsule    Hypertension     Well controlled  Losartan refill issued x90 days as she requests  Return in 6 months for next recheck         Relevant Medications    losartan (COZAAR) 100 MG tablet    Hypothyroidism due to Hashimoto's thyroiditis     Managed by endocrine  F/U updated today         Morbid obesity with BMI of 45.0-49.9, adult ([address])     Healthy diet and regular exercise as able encouraged         Type 2 diabetes mellitus without complication, without long-term current use of insulin (HCC)       Lab Results   Component Value Date    HGBA1C 7.1 (H) 2023   Managed by endocrine - follow up UTD today          Other Visit Diagnoses       Annual physical exam    -  Primary    PE updated, next due in 1 year; states she is unable to complete colon screen (unable to get sample to post office)    Screening mammogram for breast cancer        order reprinted to schedule mammo as previously ordered    Relevant Orders    Mammo screening bilateral w 3d & cad    Encounter for immunization        Relevant Orders    influenza vaccine, quadrivalent, 0.5 mL, preservative-free, for adult and pediatric patients 6 mos+ (AFLURIA, FLUARIX, FLULAVAL, FLUZONE) (Completed)    Essential hypertension        Relevant Medications    losartan (COZAAR) 100 MG tablet            Immunizations and preventive care screenings were discussed with patient today. Appropriate education was printed on patient's after visit summary.     Counseling:  Dental Health: discussed importance of regular tooth brushing, flossing, and dental visits. Exercise: the importance of regular exercise/physical activity was discussed. Recommend exercise 3-5 times per week for at least 30 minutes. BMI Counseling: Body mass index is 47 kg/m². The BMI is above normal. Nutrition recommendations include encouraging healthy choices of fruits and vegetables, consuming healthier snacks, limiting drinks that contain sugar, moderation in carbohydrate intake and reducing intake of cholesterol. Exercise recommendations include exercising 3-5 times per week. No pharmacotherapy was ordered. Rationale for BMI follow-up plan is due to patient being overweight or obese. Return in about 6 months (around 5/20/2024) for Next scheduled follow up. Chief Complaint:     Chief Complaint   Patient presents with    Physical Exam      History of Present Illness:     Adult Annual Physical   Patient here for a comprehensive physical exam. The patient reports no problems. Saw endocrine today before this appointment. A1C was up a bit higher than previous - meds weren't changed, advised to watch diet better. Hasn't completed stool for colon screening - states she is unable to get to post office in timely manner (transportation difficulties). Also hasn't completed mammo with difficulty getting to appt  Had pelvic US scheduled but went to wrong campus. States she will try to reschedule this. Diet and Physical Activity  Diet/Nutrition: poor diet. Exercise: no formal exercise. Depression Screening  PHQ-2/9 Depression Screening           General Health  Sleep: sleeps well. Hearing: normal - bilateral.  Vision: most recent eye exam >1 year ago and wears glasses. Dental: no dental visits for >1 year. /GYN Health  Follows with gynecology? no   Patient is: postmenopausal       Review of Systems:     Review of Systems   Constitutional: Negative. HENT: Negative.      Respiratory: Negative. Cardiovascular: Negative. Gastrointestinal: Negative. Genitourinary:  Positive for frequency (chronic OAB - no help w/myrbetriq (made worse)). Musculoskeletal: Negative. Neurological: Negative. Psychiatric/Behavioral: Negative.           Past Medical History:     Past Medical History:   Diagnosis Date    Anxiety     Arthritis     last assessed: June 4, 2012    Depression     Diabetes [facility])     mellitus - Resolved: Nov 18, 2015    Hashimoto's thyroiditis       Past Surgical History:     Past Surgical History:   Procedure Laterality Date    CATARACT EXTRACTION Left     FOOT SURGERY Left     plantars wart removed    TONSILLECTOMY        Social History:     Social History     Socioeconomic History    Marital status:      Spouse name: None    Number of children: None    Years of education: None    Highest education level: None   Occupational History    None   Tobacco Use    Smoking status: Never    Smokeless tobacco: Never   Vaping Use    Vaping Use: Never used   Substance and Sexual Activity    Alcohol use: No    Drug use: No    Sexual activity: Not Currently   Other Topics Concern    None   Social History Narrative    Lives alone without help available     Martial history:      Travels by public transportation      Social Determinants of Health     Financial Resource Strain: Not on file   Food Insecurity: Not on file   Transportation Needs: Not on file   Physical Activity: Not on file   Stress: Not on file   Social Connections: Not on file   Intimate Partner Violence: Not on file   Housing Stability: Not on file      Family History:     Family History   Problem Relation Age of Onset    Diabetes Mother         mellitus     Diabetes type II Mother     Mental illness Mother     Esophageal cancer Father     No Known Problems Maternal Grandmother     No Known Problems Maternal Grandfather     No Known Problems Paternal Grandmother     No Known Problems Paternal Grandfather     No Known Problems Brother     No Known Problems Brother     No Known Problems Brother     Cancer Maternal Aunt         unknown primary    No Known Problems Maternal Aunt     No Known Problems Paternal Aunt     No Known Problems Paternal Aunt     No Known Problems Paternal Aunt       Current Medications:     Current Outpatient Medications   Medication Sig Dispense Refill    betamethasone dipropionate (DIPROSONE) 0.05 % cream apply topically to affected area twice a day 45 g 0    cholecalciferol (VITAMIN D3) 1,000 units tablet Take by mouth daily        FLUoxetine (PROzac) 40 MG capsule Take 1 capsule (40 mg total) by mouth daily 90 capsule 1    levothyroxine 112 mcg tablet Take 1 tablet (112 mcg total) by mouth daily 90 tablet 1    losartan (COZAAR) 100 MG tablet Take 1 tablet (100 mg total) by mouth daily 90 tablet 1    metFORMIN (GLUCOPHAGE) 500 mg tablet Take 2 tablets (1,000 mg total) by mouth 2 (two) times a day with meals 360 tablet 1    MULTIPLE VITAMIN PO Take by mouth daily        Naproxen Sodium (ALEVE PO) Take by mouth      QUEtiapine (SEROquel) 50 mg tablet Take 1 tablet (50 mg total) by mouth daily at bedtime 30 tablet 5    simvastatin (ZOCOR) 20 mg tablet Take 1 tablet (20 mg total) by mouth daily at bedtime 90 tablet 2    spironolactone (ALDACTONE) 50 mg tablet Take 1 tablet (50 mg total) by mouth 2 (two) times a day 180 tablet 1     No current facility-administered medications for this visit. Allergies: Allergies   Allergen Reactions    Lisinopril Cough     Reaction Date: [id number]; Losartan Eye Swelling      Physical Exam:     /70   Pulse 74   Temp 97.6 °F (36.4 °C)   Ht 5' 4" (1.626 m)   Wt 124 kg (273 lb 12.8 oz)   BMI 47.00 kg/m²     Physical Exam  Vitals reviewed. Constitutional:       General: She is not in acute distress. Appearance: Normal appearance. She is obese. HENT:      Head: Normocephalic. Eyes:      General: No scleral icterus.   Neck:      Thyroid: Thyromegaly (smooth) present. Vascular: No carotid bruit. Cardiovascular:      Rate and Rhythm: Normal rate and regular rhythm. Heart sounds: No murmur heard. Pulmonary:      Effort: Pulmonary effort is normal. No respiratory distress. Breath sounds: Normal breath sounds. Abdominal:      Palpations: Abdomen is soft. Musculoskeletal:      Cervical back: Normal range of motion. Right lower leg: No edema. Left lower leg: No edema. Lymphadenopathy:      Cervical: No cervical adenopathy. Skin:     General: Skin is warm and dry. Neurological:      General: No focal deficit present. Mental Status: She is alert and oriented to person, place, and time. Psychiatric:         Mood and Affect: Mood normal.         Behavior: Behavior normal.         Thought Content: Thought content normal.         Judgment: Judgment normal.          JAYSON Lauren  [facility] [address] [address]       BMI Counseling: Body mass index is 47 kg/m². The BMI is above normal. Nutrition recommendations include 3-5 servings of fruits/vegetables daily, consuming healthier snacks, moderation in carbohydrate intake, and reducing intake of cholesterol. Exercise recommendations include exercising 3-5 times per week.

## 2023-11-20 NOTE — PATIENT INSTRUCTIONS
Wellness Visit for Adults   AMBULATORY CARE:   A wellness visit  is when you see your healthcare provider to get screened for health problems. Your healthcare provider will also give you advice on how to stay healthy. Write down your questions so you remember to ask them. Ask your healthcare provider how often you should have a wellness visit. What happens at a wellness visit:  Your healthcare provider will ask about your health, and your family history of health problems. This includes high blood pressure, heart disease, and cancer. He or she will ask if you have symptoms that concern you, if you smoke, and about your mood. You may also be asked about your intake of medicines, supplements, food, and alcohol. Any of the following may be done: Your weight  will be checked. Your height may also be checked so your body mass index (BMI) can be calculated. Your BMI shows if you are at a healthy weight. Your blood pressure  and heart rate will be checked. Your temperature may also be checked. Blood and urine tests  may be done. Blood tests may be done to check your cholesterol levels. Abnormal cholesterol levels increase your risk for heart disease and stroke. You may also need a blood or urine test to check for diabetes if you are at increased risk. Urine tests may be done to look for signs of an infection or kidney disease. A physical exam  includes checking your heartbeat and lungs with a stethoscope. Your healthcare provider may also check your skin to look for sun damage. Screening tests  may be recommended. A screening test is done to check for diseases that may not cause symptoms. The screening tests you may need depend on your age, gender, family history, and lifestyle habits. For example, colorectal screening may be recommended if you are 48years old or older. Screening tests you need if you are a woman:   A Pap smear  is used to screen for cervical cancer.  Pap smears are usually done every 3 to 5 years depending on your age. You may need them more often if you have had abnormal Pap smear test results in the past. Ask your healthcare provider how often you should have a Pap smear. A mammogram  is an x-ray of your breasts to screen for breast cancer. Experts recommend mammograms every 2 years starting at age 48 years. You may need a mammogram at age 52 years or younger if you have an increased risk for breast cancer. Talk to your healthcare provider about when you should start having mammograms and how often you need them. Vaccines you may need:   Get an influenza vaccine  every year. The influenza vaccine protects you from the flu. Several types of viruses cause the flu. The viruses change over time, so new vaccines are made each year. Get a tetanus-diphtheria (Td) booster vaccine  every 10 years. This vaccine protects you against tetanus and diphtheria. Tetanus is a severe infection that may cause painful muscle spasms and lockjaw. Diphtheria is a severe bacterial infection that causes a thick covering in the back of your mouth and throat. Get a human papillomavirus (HPV) vaccine  if you are female and aged 23 to 32 or male 23 to 24 and never received it. This vaccine protects you from HPV infection. HPV is the most common infection spread by sexual contact. HPV may also cause vaginal, penile, and anal cancers. Get a pneumococcal vaccine  if you are aged 72 years or older. The pneumococcal vaccine is an injection given to protect you from pneumococcal disease. Pneumococcal disease is an infection caused by pneumococcal bacteria. The infection may cause pneumonia, meningitis, or an ear infection. Get a shingles vaccine  if you are 60 or older, even if you have had shingles before. The shingles vaccine is an injection to protect you from the varicella-zoster virus. This is the same virus that causes chickenpox.  Shingles is a painful rash that develops in people who had chickenpox or have been exposed to the virus. How to eat healthy:  My Plate is a model for planning healthy meals. It shows the types and amounts of foods that should go on your plate. Fruits and vegetables make up about half of your plate, and grains and protein make up the other half. A serving of dairy is included on the side of your plate. The amount of calories and serving sizes you need depends on your age, gender, weight, and height. Examples of healthy foods are listed below:  Eat a variety of vegetables  such as dark green, red, and orange vegetables. You can also include canned vegetables low in sodium (salt) and frozen vegetables without added butter or sauces. Eat a variety of fresh fruits , canned fruit in 100% juice, frozen fruit, and dried fruit. Include whole grains. At least half of the grains you eat should be whole grains. Examples include whole-wheat bread, wheat pasta, brown rice, and whole-grain cereals such as oatmeal.    Eat a variety of protein foods such as seafood (fish and shellfish), lean meat, and poultry without skin (turkey and chicken). Examples of lean meats include pork leg, shoulder, or tenderloin, and beef round, sirloin, tenderloin, and extra lean ground beef. Other protein foods include eggs and egg substitutes, beans, peas, soy products, nuts, and seeds. Choose low-fat dairy products such as skim or 1% milk or low-fat yogurt, cheese, and cottage cheese. Limit unhealthy fats  such as butter, hard margarine, and shortening. Exercise:  Exercise at least 30 minutes per day on most days of the week. Some examples of exercise include walking, biking, dancing, and swimming. You can also fit in more physical activity by taking the stairs instead of the elevator or parking farther away from stores. Include muscle strengthening activities 2 days each week. Regular exercise provides many health benefits.  It helps you manage your weight, and decreases your risk for type 2 diabetes, heart disease, stroke, and high blood pressure. Exercise can also help improve your mood. Ask your healthcare provider about the best exercise plan for you. General health and safety guidelines:   Do not smoke. Nicotine and other chemicals in cigarettes and cigars can cause lung damage. Ask your healthcare provider for information if you currently smoke and need help to quit. E-cigarettes or smokeless tobacco still contain nicotine. Talk to your healthcare provider before you use these products. Limit alcohol. A drink of alcohol is 12 ounces of beer, 5 ounces of wine, or 1½ ounces of liquor. Lose weight, if needed. Being overweight increases your risk of certain health conditions. These include heart disease, high blood pressure, type 2 diabetes, and certain types of cancer. Protect your skin. Do not sunbathe or use tanning beds. Use sunscreen with a SPF 15 or higher. Apply sunscreen at least 15 minutes before you go outside. Reapply sunscreen every 2 hours. Wear protective clothing, hats, and sunglasses when you are outside. Drive safely. Always wear your seatbelt. Make sure everyone in your car wears a seatbelt. A seatbelt can save your life if you are in an accident. Do not use your cell phone when you are driving. This could distract you and cause an accident. Pull over if you need to make a call or send a text message. Practice safe sex. Use latex condoms if are sexually active and have more than one partner. Your healthcare provider may recommend screening tests for sexually transmitted infections (STIs). Wear helmets, lifejackets, and protective gear. Always wear a helmet when you ride a bike or motorcycle, go skiing, or play sports that could cause a head injury. Wear protective equipment when you play sports. Wear a lifejacket when you are on a boat or doing water sports.     © Copyright [facility] 2023 Information is for End User's use only and may not be sold, redistributed or otherwise used for commercial purposes. The above information is an  only. It is not intended as medical advice for individual conditions or treatments. Talk to your doctor, nurse or pharmacist before following any medical regimen to see if it is safe and effective for you. Obesity   AMBULATORY CARE:   Obesity  means your body mass index (BMI) is greater than 30. Your healthcare provider will use your age, height, and weight to measure your BMI. The risks of obesity include  many health problems, including injuries or physical disability. Diabetes (high blood sugar level)    High blood pressure or high cholesterol    Heart disease or heart failure    Stroke    Gallbladder or liver disease    Cancer of the colon, breast, prostate, liver, or kidney    Sleep apnea    Arthritis or gout    Screening  is done to check for health conditions before you have signs or symptoms. If you are 28to 79years old, your blood sugar level may be checked every 3 years for signs of prediabetes or diabetes. Your healthcare provider will check your blood pressure at each visit. High blood pressure can lead to a stroke or other problems. Your provider may check for signs of heart disease, cancer, or other health problems. Seek care immediately if:   You have a severe headache, confusion, or difficulty speaking. You have weakness on one side of your body. You have chest pain, sweating, or shortness of breath. Call your doctor if:   You have symptoms of gallbladder or liver disease, such as pain in your upper abdomen. You have knee or hip pain and discomfort while walking. You have symptoms of diabetes, such as intense hunger and thirst, and frequent urination. You have symptoms of sleep apnea, such as snoring or daytime sleepiness. You have questions or concerns about your condition or care. Treatment for obesity  focuses on helping you lose weight to improve your health.  Even a small decrease in BMI can reduce the risk for many health problems. Your healthcare provider will help you set a weight-loss goal.  Lifestyle changes  are the first step in treating obesity. These include making healthy food choices and getting regular physical activity. Your healthcare provider may suggest a weight-loss program that involves coaching, education, and therapy. Medicine  may help you lose weight when it is used with a healthy foods and physical activity. Surgery  can help you lose weight if you have obesity along with other health problems. Several types of weight-loss surgery are available. Ask your healthcare provider for more information. Tips for safe weight loss:   Set small, realistic goals. An example of a small goal is to walk for 20 minutes 5 days a week. Anther goal is to lose 5% of your body weight. Ask for support. Tell friends, family members, and coworkers about your goals. Ask someone to lose weight with you. You may also want to join a weight-loss support group. Identify foods or triggers that may cause you to overeat. Remove tempting high-calorie foods from your home and workplace. Place a bowl of fresh fruit on your kitchen counter. If stress causes you to eat, find other ways to cope with stress. A counselor or therapist may be able to help you. Track your daily calories and activity. Write down what you eat and drink. Also write down how many minutes of physical activity you do each day. Track your weekly weight. Weigh yourself in the morning, before you eat or drink anything but after you use the bathroom. Use the same scale, in the same place, and in similar clothing each time. Only weigh yourself 1 to 2 times each week, or as directed. You may become discouraged if you weigh yourself every day. Eating changes: You will need to eat 500 to 1,000 fewer calories each day than you currently eat to lose 1 to 2 pounds a week.  The following changes will help you cut calories:  Eat smaller portions. Use small plates, no larger than 9 inches in diameter. Fill your plate half full of fruits and vegetables. Measure your food using measuring cups until you know what a serving size looks like. Eat 3 meals and 1 or 2 snacks each day. Plan your meals in advance. Adam [place] and eat at home most of the time. Eat slowly. Do not skip meals. Skipping meals can lead to overeating later in the day. This can make it harder for you to lose weight. Talk with a dietitian to help you make a meal plan and schedule that is right for you. Eat fruits and vegetables at every meal.  They are low in calories and high in fiber, which makes you feel full. Do not add butter, margarine, or cream sauce to vegetables. Use herbs to season steamed vegetables. Eat less fat and fewer fried foods. Eat more baked or grilled chicken and fish. These protein sources are lower in calories and fat than red meat. Limit fast food. Dress your salads with olive oil and vinegar instead of bottled dressing. Limit the amount of sugar you eat. Do not drink sugary beverages. Limit alcohol. Activity changes:  Physical activity is good for your body in many ways. It helps you burn calories and build strong muscles. It decreases stress and depression, and improves your mood. It can also help you sleep better. Talk to your healthcare provider before you begin an exercise program.  Exercise for at least 30 minutes 5 days a week. Start slowly. Set aside time each day for physical activity that you enjoy and that is convenient for you. It is best to do both weight training and an activity that increases your heart rate, such as walking, bicycling, or swimming. Find ways to be more active. Do yard work and housecleaning. Walk up the stairs instead of using elevators. Spend your leisure time going to events that require walking, such as outdoor festivals or fairs.  This extra physical activity can help you lose weight and keep it off. Follow up with your doctor as directed: You may need to meet with a dietitian. Write down your questions so you remember to ask them during your visits. © Copyright [employer] 2023 Information is for End User's use only and may not be sold, redistributed or otherwise used for commercial purposes. The above information is an  only. It is not intended as medical advice for individual conditions or treatments. Talk to your doctor, nurse or pharmacist before following any medical regimen to see if it is safe and effective for you.

## 2023-11-20 NOTE — ASSESSMENT & PLAN NOTE
Most recent hemoglobin A1c is 7.1 percent which is a bit higher than her usual but still not bad. She needs to get on track with better diet and activity. For now, she will continue the same metformin dosage. I have asked her to follow up with the ophthalmologist as she is several years overdue.

## 2023-11-20 NOTE — ASSESSMENT & PLAN NOTE
She will continue the same simvastatin 20 mg daily. I will repeat a lipid profile with her next visit.

## 2023-11-20 NOTE — ASSESSMENT & PLAN NOTE
Well controlled  Losartan refill issued x90 days as she requests  Return in 6 months for next recheck

## 2023-11-20 NOTE — PROGRESS NOTES
11/21/2023    Assessment/Plan     1. Type 2 diabetes mellitus without complication, without long-term current use of insulin ([facility])  Assessment & Plan:  Most recent hemoglobin A1c is 7.1 percent which is a bit higher than her usual but still not bad. She needs to get on track with better diet and activity. For now, she will continue the same metformin dosage. I have asked her to follow up with the ophthalmologist as she is several years overdue. Orders:  -     metFORMIN (GLUCOPHAGE) 500 mg tablet; Take 2 tablets (1,000 mg total) by mouth 2 (two) times a day with meals  -     Comprehensive metabolic panel Lab Collect; Future; Expected date: 04/15/2024  -     CBC and differential Lab Collect; Future; Expected date: 04/15/2024  -     HEMOGLOBIN A1C W/ EAG ESTIMATION Lab Collect; Future; Expected date: 04/15/2024  -     TSH, 3rd generation Lab Collect; Future; Expected date: 04/15/2024  -     T4, free Lab Collect; Future; Expected date: 04/15/2024  -     Lipid Panel with Direct LDL reflex Lab Collect; Future; Expected date: 04/15/2024  -     Albumin / creatinine urine ratio; Future; Expected date: 04/15/2024  -     Comprehensive metabolic panel Lab Collect  -     CBC and differential Lab Collect  -     HEMOGLOBIN A1C W/ EAG ESTIMATION Lab Collect  -     TSH, 3rd generation Lab Collect  -     T4, free Lab Collect  -     Lipid Panel with Direct LDL reflex Lab Collect  -     Albumin / creatinine urine ratio    2. Diabetic polyneuropathy associated with type 2 diabetes mellitus ([address])  Assessment & Plan:  She denies neuropathic symptoms. Diabetic foot exams are up to date. Orders:  -     Comprehensive metabolic panel Lab Collect; Future; Expected date: 04/15/2024  -     CBC and differential Lab Collect; Future; Expected date: 04/15/2024  -     HEMOGLOBIN A1C W/ EAG ESTIMATION Lab Collect; Future; Expected date: 04/15/2024  -     TSH, 3rd generation Lab Collect;  Future; Expected date: 04/15/2024  -     T4, free Lab Collect; Future; Expected date: 04/15/2024  -     Lipid Panel with Direct LDL reflex Lab Collect; Future; Expected date: 04/15/2024  -     Albumin / creatinine urine ratio; Future; Expected date: 04/15/2024  -     Comprehensive metabolic panel Lab Collect  -     CBC and differential Lab Collect  -     HEMOGLOBIN A1C W/ EAG ESTIMATION Lab Collect  -     TSH, 3rd generation Lab Collect  -     T4, free Lab Collect  -     Lipid Panel with Direct LDL reflex Lab Collect  -     Albumin / creatinine urine ratio    3. Hypothyroidism due to Hashimoto's thyroiditis  Assessment & Plan:  Most recent thyroid function studies are normal. She is biochemically euthyroid and will continue the same levothyroxine 112 mcg daily. Orders:  -     Comprehensive metabolic panel Lab Collect; Future; Expected date: 04/15/2024  -     CBC and differential Lab Collect; Future; Expected date: 04/15/2024  -     HEMOGLOBIN A1C W/ EAG ESTIMATION Lab Collect; Future; Expected date: 04/15/2024  -     TSH, 3rd generation Lab Collect; Future; Expected date: 04/15/2024  -     T4, free Lab Collect; Future; Expected date: 04/15/2024  -     Lipid Panel with Direct LDL reflex Lab Collect; Future; Expected date: 04/15/2024  -     Albumin / creatinine urine ratio; Future; Expected date: 04/15/2024  -     Comprehensive metabolic panel Lab Collect  -     CBC and differential Lab Collect  -     HEMOGLOBIN A1C W/ EAG ESTIMATION Lab Collect  -     TSH, 3rd generation Lab Collect  -     T4, free Lab Collect  -     Lipid Panel with Direct LDL reflex Lab Collect  -     Albumin / creatinine urine ratio    4. Polycystic ovarian syndrome  Assessment & Plan:  Her hirsutism is somewhat improved but her hair loss is not on the spironolactone. She will continue the spironolactone 50 mg twice a day for now and metformin. Orders:  -     Comprehensive metabolic panel Lab Collect; Future; Expected date: 04/15/2024  -     CBC and differential Lab Collect;  Future; Expected date: 04/15/2024  -     HEMOGLOBIN A1C W/ EAG ESTIMATION Lab Collect; Future; Expected date: 04/15/2024  -     TSH, 3rd generation Lab Collect; Future; Expected date: 04/15/2024  -     T4, free Lab Collect; Future; Expected date: 04/15/2024  -     Lipid Panel with Direct LDL reflex Lab Collect; Future; Expected date: 04/15/2024  -     Albumin / creatinine urine ratio; Future; Expected date: 04/15/2024  -     Comprehensive metabolic panel Lab Collect  -     CBC and differential Lab Collect  -     HEMOGLOBIN A1C W/ EAG ESTIMATION Lab Collect  -     TSH, 3rd generation Lab Collect  -     T4, free Lab Collect  -     Lipid Panel with Direct LDL reflex Lab Collect  -     Albumin / creatinine urine ratio    5. Primary hypertension  Assessment & Plan:  She is normotensive in the office on her current dose of losartan and spironolactone. Orders:  -     Comprehensive metabolic panel Lab Collect; Future; Expected date: 04/15/2024  -     CBC and differential Lab Collect; Future; Expected date: 04/15/2024  -     HEMOGLOBIN A1C W/ EAG ESTIMATION Lab Collect; Future; Expected date: 04/15/2024  -     TSH, 3rd generation Lab Collect; Future; Expected date: 04/15/2024  -     T4, free Lab Collect; Future; Expected date: 04/15/2024  -     Lipid Panel with Direct LDL reflex Lab Collect; Future; Expected date: 04/15/2024  -     Albumin / creatinine urine ratio; Future; Expected date: 04/15/2024  -     Comprehensive metabolic panel Lab Collect  -     CBC and differential Lab Collect  -     HEMOGLOBIN A1C W/ EAG ESTIMATION Lab Collect  -     TSH, 3rd generation Lab Collect  -     T4, free Lab Collect  -     Lipid Panel with Direct LDL reflex Lab Collect  -     Albumin / creatinine urine ratio    6. Mixed hyperlipidemia  Assessment & Plan:   She will continue the same simvastatin 20 mg daily. I will repeat a lipid profile with her next visit. Orders:  -     Comprehensive metabolic panel Lab Collect;  Future; Expected date: 04/15/2024  -     CBC and differential Lab Collect; Future; Expected date: 04/15/2024  -     HEMOGLOBIN A1C W/ EAG ESTIMATION Lab Collect; Future; Expected date: 04/15/2024  -     TSH, 3rd generation Lab Collect; Future; Expected date: 04/15/2024  -     T4, free Lab Collect; Future; Expected date: 04/15/2024  -     Lipid Panel with Direct LDL reflex Lab Collect; Future; Expected date: 04/15/2024  -     Albumin / creatinine urine ratio; Future; Expected date: 04/15/2024  -     Comprehensive metabolic panel Lab Collect  -     CBC and differential Lab Collect  -     HEMOGLOBIN A1C W/ EAG ESTIMATION Lab Collect  -     TSH, 3rd generation Lab Collect  -     T4, free Lab Collect  -     Lipid Panel with Direct LDL reflex Lab Collect  -     Albumin / creatinine urine ratio    7. Acquired hypothyroidism  -     levothyroxine 112 mcg tablet; Take 1 tablet (112 mcg total) by mouth daily  -     Comprehensive metabolic panel Lab Collect; Future; Expected date: 04/15/2024  -     CBC and differential Lab Collect; Future; Expected date: 04/15/2024  -     HEMOGLOBIN A1C W/ EAG ESTIMATION Lab Collect; Future; Expected date: 04/15/2024  -     TSH, 3rd generation Lab Collect; Future; Expected date: 04/15/2024  -     T4, free Lab Collect; Future; Expected date: 04/15/2024  -     Lipid Panel with Direct LDL reflex Lab Collect; Future; Expected date: 04/15/2024  -     Albumin / creatinine urine ratio; Future; Expected date: 04/15/2024  -     Comprehensive metabolic panel Lab Collect  -     CBC and differential Lab Collect  -     HEMOGLOBIN A1C W/ EAG ESTIMATION Lab Collect  -     TSH, 3rd generation Lab Collect  -     T4, free Lab Collect  -     Lipid Panel with Direct LDL reflex Lab Collect  -     Albumin / creatinine urine ratio    8. Hyperlipidemia, unspecified hyperlipidemia type  Assessment & Plan:   She will continue the same simvastatin 20 mg daily. I will repeat a lipid profile with her next visit.     Orders:  -     simvastatin (ZOCOR) 20 mg tablet; Take 1 tablet (20 mg total) by mouth daily at bedtime  -     Comprehensive metabolic panel Lab Collect; Future; Expected date: 04/15/2024  -     CBC and differential Lab Collect; Future; Expected date: 04/15/2024  -     HEMOGLOBIN A1C W/ EAG ESTIMATION Lab Collect; Future; Expected date: 04/15/2024  -     TSH, 3rd generation Lab Collect; Future; Expected date: 04/15/2024  -     T4, free Lab Collect; Future; Expected date: 04/15/2024  -     Lipid Panel with Direct LDL reflex Lab Collect; Future; Expected date: 04/15/2024  -     Albumin / creatinine urine ratio; Future; Expected date: 04/15/2024  -     Comprehensive metabolic panel Lab Collect  -     CBC and differential Lab Collect  -     HEMOGLOBIN A1C W/ EAG ESTIMATION Lab Collect  -     TSH, 3rd generation Lab Collect  -     T4, free Lab Collect  -     Lipid Panel with Direct LDL reflex Lab Collect  -     Albumin / creatinine urine ratio    9. Hirsutism  -     spironolactone (ALDACTONE) 50 mg tablet; Take 1 tablet (50 mg total) by mouth 2 (two) times a day  -     Comprehensive metabolic panel Lab Collect; Future; Expected date: 04/15/2024  -     CBC and differential Lab Collect; Future; Expected date: 04/15/2024  -     HEMOGLOBIN A1C W/ EAG ESTIMATION Lab Collect; Future; Expected date: 04/15/2024  -     TSH, 3rd generation Lab Collect; Future; Expected date: 04/15/2024  -     T4, free Lab Collect; Future; Expected date: 04/15/2024  -     Lipid Panel with Direct LDL reflex Lab Collect; Future; Expected date: 04/15/2024  -     Albumin / creatinine urine ratio; Future; Expected date: 04/15/2024  -     Comprehensive metabolic panel Lab Collect  -     CBC and differential Lab Collect  -     HEMOGLOBIN A1C W/ EAG ESTIMATION Lab Collect  -     TSH, 3rd generation Lab Collect  -     T4, free Lab Collect  -     Lipid Panel with Direct LDL reflex Lab Collect  -     Albumin / creatinine urine ratio    10.  PCOS (polycystic ovarian syndrome)  - spironolactone (ALDACTONE) 50 mg tablet; Take 1 tablet (50 mg total) by mouth 2 (two) times a day  -     Comprehensive metabolic panel Lab Collect; Future; Expected date: 04/15/2024  -     CBC and differential Lab Collect; Future; Expected date: 04/15/2024  -     HEMOGLOBIN A1C W/ EAG ESTIMATION Lab Collect; Future; Expected date: 04/15/2024  -     TSH, 3rd generation Lab Collect; Future; Expected date: 04/15/2024  -     T4, free Lab Collect; Future; Expected date: 04/15/2024  -     Lipid Panel with Direct LDL reflex Lab Collect; Future; Expected date: 04/15/2024  -     Albumin / creatinine urine ratio; Future; Expected date: 04/15/2024  -     Comprehensive metabolic panel Lab Collect  -     CBC and differential Lab Collect  -     HEMOGLOBIN A1C W/ EAG ESTIMATION Lab Collect  -     TSH, 3rd generation Lab Collect  -     T4, free Lab Collect  -     Lipid Panel with Direct LDL reflex Lab Collect  -     Albumin / creatinine urine ratio        I have asked her to follow up in 6 months with preceding hemoglobin A1c, CMP, CBC, TSH, free T4, lipid panel, and urine microalbumin to creatinine ratio. CC: Diabetes type II, thyroid, PCOS, blood pressure, lipid follow-up    History of Present Illness     HPI: Alisha Aguila is a 65 y.o. female who comes into the clinic with history of type 2 diabetes with neuropathy, hypothyroidism, PCOS, hypertension, and hyperlipidemia for a follow-up visit. Diabetic complications include diabetic neuropathy. She denies nephropathy, retinopathy, heart attack, stroke, or claudication. Completed in the hospital was in normal range. The patient has type 2 diabetes. She is currently taking metformin 500 mg 2 tablets twice a day and is tolerating well. She admits having polyuria but does not get up at night to urinate. She has not noticed any polyphagia, feet paresthesia, or blurry vision. She experiences increased dryness of the skin and itchiness.  She does not have any diarrhea, constipation, or abdominal pain. She does not monitors her blood glucose level at home. She reports experiencing a shaky feeling in the past, but that has not occured in a long time. She has underactive thyroid due to Hashimoto's thyroiditis and is currently taking  on levothyroxine 112 mcg once a day. She has excessive nail breakage and hair loss with a receding hairline. She reports extreme heat insensitivity especially during summer season. She denies any heart racing, palpitations, or tremors. She is on spironolactone 50 mg twice a day for her hirsutism on her upper lip, chin, and neck areas. She sleeps well at night  with the help of double dose intake of Seroquel, which aids her in sleeping due to the stress from her recent divorce and daily life pressures. However, she notes that at one point it started being too strong for her, so she cut the dose in half. She is having sleep apnea and has tried CPAP mask ventilation wherein some days she is doing fine with it. Furthermore, there are occasions when she awakens after a full 7 to 8 hours of sleep and still feels exhausted. She denies any chest pain, dyspnea, headaches, lightheadedness, or dizziness. Hypoglycemic episodes: No.  H/o of hypoglycemia causing hospitalization or intervention such as glucagon injection or ambulance call: No.  Hypoglycemia symptoms: No.  Treatment of hypoglycemia: No.  Glucagon: No.  Medic alert tag: recommended: No.    She has polycystic ovary syndrome. She is on losartan 100 mg once a day and spironolactone 50 mg twice a day for her hypertension. She is on simvastatin 20 mg once a day for her hyperlipidemia. The patient's last eye exam was in 2020. The patient's last foot exam was in  05/2023. Last A1C was   Lab Results   Component Value Date    HGBA1C 7.1 (H) 11/08/2023   . Review of Systems  The pertinent positive and negative findings are as noted in the HPI.     Historical Information   Past Medical History:   Diagnosis Date    Anxiety     Arthritis     last assessed: June 4, 2012    Depression     Diabetes [facility])     mellitus - Resolved: Nov 18, 2015    Hashimoto's thyroiditis      Past Surgical History:   Procedure Laterality Date    CATARACT EXTRACTION Left     FOOT SURGERY Left     plantars wart removed    TONSILLECTOMY       Social History   Social History     Substance and Sexual Activity   Alcohol Use No     Social History     Substance and Sexual Activity   Drug Use No     Social History     Tobacco Use   Smoking Status Never   Smokeless Tobacco Never     Family History:   Family History   Problem Relation Age of Onset    Diabetes Mother         mellitus     Diabetes type II Mother     Mental illness Mother     Esophageal cancer Father     No Known Problems Maternal Grandmother     No Known Problems Maternal Grandfather     No Known Problems Paternal Grandmother     No Known Problems Paternal Grandfather     No Known Problems Brother     No Known Problems Brother     No Known Problems Brother     Cancer Maternal Aunt         unknown primary    No Known Problems Maternal Aunt     No Known Problems Paternal Aunt     No Known Problems Paternal Aunt     No Known Problems Paternal Aunt        Meds/Allergies   Current Outpatient Medications   Medication Sig Dispense Refill    betamethasone dipropionate (DIPROSONE) 0.05 % cream apply topically to affected area twice a day 45 g 0    cholecalciferol (VITAMIN D3) 1,000 units tablet Take by mouth daily        levothyroxine 112 mcg tablet Take 1 tablet (112 mcg total) by mouth daily 90 tablet 1    metFORMIN (GLUCOPHAGE) 500 mg tablet Take 2 tablets (1,000 mg total) by mouth 2 (two) times a day with meals 360 tablet 1    MULTIPLE VITAMIN PO Take by mouth daily        Naproxen Sodium (ALEVE PO) Take by mouth      simvastatin (ZOCOR) 20 mg tablet Take 1 tablet (20 mg total) by mouth daily at bedtime 90 tablet 2    spironolactone (ALDACTONE) 50 mg tablet Take 1 tablet (50 mg total) by mouth 2 (two) times a day 180 tablet 1    FLUoxetine (PROzac) 40 MG capsule Take 1 capsule (40 mg total) by mouth daily 90 capsule 1    losartan (COZAAR) 100 MG tablet Take 1 tablet (100 mg total) by mouth daily 90 tablet 1    QUEtiapine (SEROquel) 50 mg tablet Take 1 tablet (50 mg total) by mouth daily at bedtime 30 tablet 5     No current facility-administered medications for this visit. Allergies   Allergen Reactions    Lisinopril Cough     Reaction Date: [id number]; Losartan Eye Swelling       Objective   Vitals: Blood pressure 110/72, pulse 76, height 5' 4" (1.626 m), weight 123 kg (272 lb). Invasive Devices       None                   Physical Exam    Physical exam normal with pertinent positives and negatives. Eyes: No lid lags, stare, proptosis, or periorbital edema. Neck: Thyroid normal in size. No palpable nodules. Respiratory: Lungs are clear. Cardiovascular: Heart regular. No murmurs. Extremities: No lower extremity edema. Neuro;ogical: No tremor of the outstretched hands. Patellar deep tendon reflexes normal.  The history was obtained from the review of the chart and from the patient.     Lab Results:    Most recent Alc is  Lab Results   Component Value Date    HGBA1C 7.1 (H) 11/08/2023               Lab Results   Component Value Date    CREATININE 0.65 11/08/2023    CREATININE 0.74 05/09/2023    CREATININE 0.74 11/09/2022    BUN 11 11/08/2023     08/24/2017    K 4.4 11/08/2023    CL 98 11/08/2023    CO2 26 11/08/2023     eGFR   Date Value Ref Range Status   11/08/2023 100 >59 mL/min/1.73 Final         Lab Results   Component Value Date    CHOL 166 08/24/2017    HDL 39 (L) 05/09/2023    TRIG 192 (H) 05/09/2023    CHOLHDL 3.5 05/09/2023       Lab Results   Component Value Date    ALT 29 11/08/2023    AST 39 11/08/2023    ALKPHOS 58 08/24/2017    BILITOT 0.2 08/24/2017       Lab Results   Component Value Date    TSH 1.130 11/08/2023    FREET4 1.48 11/08/2023 Blood work performed on 11/08/2023 showed a hemoglobin A1c of 7.1 percent. CMP showed a glucose of 131 fasting but was otherwise normal.     TSH is 1.13 with a free T4 of 1.48. Future Appointments   Date Time Provider [address]   5/21/2024  2:00 PM Teja Monk MD [facility]   5/21/2024  3:40 PM JAYSON Castillo [facility]       Transcribed for Teja Monk MD, by Dara King on 11/21/23 at 9:32 AM. Powered by [employer].

## 2023-11-20 NOTE — PATIENT INSTRUCTIONS
Hgba1c is 7.1%. this is a bit higher but not bad. Work on diet. Continue the same metformin. Eye doctor visit. The thyroid blood work is normal    Continue the same levothyroxine/synthroid. Follow up in 6 months with blood work.

## 2023-11-20 NOTE — ASSESSMENT & PLAN NOTE
Her hirsutism is somewhat improved but her hair loss is not on the spironolactone. She will continue the spironolactone 50 mg twice a day for now and metformin.

## 2023-11-20 NOTE — ASSESSMENT & PLAN NOTE
Most recent thyroid function studies are normal. She is biochemically euthyroid and will continue the same levothyroxine 112 mcg daily.

## 2023-11-20 NOTE — ASSESSMENT & PLAN NOTE
Mood stable on current meds  #90 day refills issued as requested  Return in 6 months - call sooner w/any mood concerns

## 2023-11-20 NOTE — ASSESSMENT & PLAN NOTE
Lab Results   Component Value Date    HGBA1C 7.1 (H) 11/08/2023     Managed by endocrine - follow up UTD today

## 2024-03-20 ENCOUNTER — TELEPHONE (OUTPATIENT)
Dept: ENDOCRINOLOGY | Facility: HOSPITAL | Age: 64
End: 2024-03-20

## 2024-03-20 NOTE — TELEPHONE ENCOUNTER
Pt called in regards to medication simvastatin (ZOCOR) 20 mg tablet . Pt wanted to refill medication. Informed pt that there is refills remaining on script to contact pharmacy to refill medication.

## 2024-05-09 LAB
ALBUMIN SERPL-MCNC: 4.4 G/DL (ref 3.9–4.9)
ALBUMIN/CREAT UR: 10 MG/G CREAT (ref 0–29)
ALBUMIN/GLOB SERPL: 2.1 {RATIO} (ref 1.2–2.2)
ALP SERPL-CCNC: 61 IU/L (ref 44–121)
ALT SERPL-CCNC: 24 IU/L (ref 0–32)
AST SERPL-CCNC: 36 IU/L (ref 0–40)
BASOPHILS # BLD AUTO: 0 X10E3/UL (ref 0–0.2)
BASOPHILS NFR BLD AUTO: 0 %
BILIRUB SERPL-MCNC: 0.3 MG/DL (ref 0–1.2)
BUN SERPL-MCNC: 14 MG/DL (ref 8–27)
BUN/CREAT SERPL: 22 (ref 12–28)
CALCIUM SERPL-MCNC: 9.8 MG/DL (ref 8.7–10.3)
CHLORIDE SERPL-SCNC: 99 MMOL/L (ref 96–106)
CHOLEST SERPL-MCNC: 129 MG/DL (ref 100–199)
CO2 SERPL-SCNC: 24 MMOL/L (ref 20–29)
CREAT SERPL-MCNC: 0.64 MG/DL (ref 0.57–1)
CREAT UR-MCNC: 247 MG/DL
EGFR: 100 ML/MIN/1.73
EOSINOPHIL # BLD AUTO: 0.2 X10E3/UL (ref 0–0.4)
EOSINOPHIL NFR BLD AUTO: 3 %
ERYTHROCYTE [DISTWIDTH] IN BLOOD BY AUTOMATED COUNT: 12.1 % (ref 11.7–15.4)
EST. AVERAGE GLUCOSE BLD GHB EST-MCNC: 157 MG/DL
GLOBULIN SER-MCNC: 2.1 G/DL (ref 1.5–4.5)
GLUCOSE SERPL-MCNC: 108 MG/DL (ref 70–99)
HBA1C MFR BLD: 7.1 % (ref 4.8–5.6)
HCT VFR BLD AUTO: 43.4 % (ref 34–46.6)
HDLC SERPL-MCNC: 40 MG/DL
HGB BLD-MCNC: 14.4 G/DL (ref 11.1–15.9)
IMM GRANULOCYTES # BLD: 0.1 X10E3/UL (ref 0–0.1)
IMM GRANULOCYTES NFR BLD: 1 %
LDLC SERPL CALC-MCNC: 58 MG/DL (ref 0–99)
LDLC/HDLC SERPL: 1.5 RATIO (ref 0–3.2)
LYMPHOCYTES # BLD AUTO: 2.8 X10E3/UL (ref 0.7–3.1)
LYMPHOCYTES NFR BLD AUTO: 39 %
MCH RBC QN AUTO: 32.1 PG (ref 26.6–33)
MCHC RBC AUTO-ENTMCNC: 33.2 G/DL (ref 31.5–35.7)
MCV RBC AUTO: 97 FL (ref 79–97)
MICROALBUMIN UR-MCNC: 24.4 UG/ML
MONOCYTES # BLD AUTO: 0.6 X10E3/UL (ref 0.1–0.9)
MONOCYTES NFR BLD AUTO: 8 %
NEUTROPHILS # BLD AUTO: 3.4 X10E3/UL (ref 1.4–7)
NEUTROPHILS NFR BLD AUTO: 49 %
PLATELET # BLD AUTO: 203 X10E3/UL (ref 150–450)
POTASSIUM SERPL-SCNC: 4.5 MMOL/L (ref 3.5–5.2)
PROT SERPL-MCNC: 6.5 G/DL (ref 6–8.5)
RBC # BLD AUTO: 4.49 X10E6/UL (ref 3.77–5.28)
SL AMB VLDL CHOLESTEROL CALC: 31 MG/DL (ref 5–40)
SODIUM SERPL-SCNC: 140 MMOL/L (ref 134–144)
T4 FREE SERPL-MCNC: 1.44 NG/DL (ref 0.82–1.77)
TRIGL SERPL-MCNC: 185 MG/DL (ref 0–149)
TSH SERPL DL<=0.005 MIU/L-ACNC: 1.08 UIU/ML (ref 0.45–4.5)
WBC # BLD AUTO: 7.1 X10E3/UL (ref 3.4–10.8)

## 2024-05-17 DIAGNOSIS — E03.9 ACQUIRED HYPOTHYROIDISM: ICD-10-CM

## 2024-05-17 DIAGNOSIS — F33.42 RECURRENT MAJOR DEPRESSIVE DISORDER, IN FULL REMISSION (HCC): ICD-10-CM

## 2024-05-17 DIAGNOSIS — I10 ESSENTIAL HYPERTENSION: ICD-10-CM

## 2024-05-17 RX ORDER — LEVOTHYROXINE SODIUM 112 UG/1
112 TABLET ORAL DAILY
Qty: 90 TABLET | Refills: 1 | Status: SHIPPED | OUTPATIENT
Start: 2024-05-17 | End: 2024-05-21 | Stop reason: SDUPTHER

## 2024-05-18 RX ORDER — FLUOXETINE HYDROCHLORIDE 40 MG/1
40 CAPSULE ORAL DAILY
Qty: 90 CAPSULE | Refills: 1 | Status: SHIPPED | OUTPATIENT
Start: 2024-05-18 | End: 2024-05-21 | Stop reason: SDUPTHER

## 2024-05-18 RX ORDER — LOSARTAN POTASSIUM 100 MG/1
100 TABLET ORAL DAILY
Qty: 90 TABLET | Refills: 1 | Status: SHIPPED | OUTPATIENT
Start: 2024-05-18 | End: 2024-05-21 | Stop reason: SDUPTHER

## 2024-05-21 ENCOUNTER — OFFICE VISIT (OUTPATIENT)
Dept: ENDOCRINOLOGY | Facility: HOSPITAL | Age: 64
End: 2024-05-21
Payer: COMMERCIAL

## 2024-05-21 ENCOUNTER — OFFICE VISIT (OUTPATIENT)
Dept: FAMILY MEDICINE CLINIC | Facility: HOSPITAL | Age: 64
End: 2024-05-21
Payer: COMMERCIAL

## 2024-05-21 VITALS
DIASTOLIC BLOOD PRESSURE: 72 MMHG | BODY MASS INDEX: 46.57 KG/M2 | SYSTOLIC BLOOD PRESSURE: 116 MMHG | HEART RATE: 74 BPM | TEMPERATURE: 97.8 F | HEIGHT: 64 IN | WEIGHT: 272.8 LBS

## 2024-05-21 VITALS
WEIGHT: 272 LBS | HEIGHT: 64 IN | SYSTOLIC BLOOD PRESSURE: 114 MMHG | DIASTOLIC BLOOD PRESSURE: 70 MMHG | BODY MASS INDEX: 46.44 KG/M2 | HEART RATE: 82 BPM

## 2024-05-21 DIAGNOSIS — E11.9 TYPE 2 DIABETES MELLITUS WITHOUT COMPLICATION, WITHOUT LONG-TERM CURRENT USE OF INSULIN (HCC): Primary | ICD-10-CM

## 2024-05-21 DIAGNOSIS — L85.3 DRY SKIN DERMATITIS: ICD-10-CM

## 2024-05-21 DIAGNOSIS — F33.42 RECURRENT MAJOR DEPRESSIVE DISORDER, IN FULL REMISSION (HCC): ICD-10-CM

## 2024-05-21 DIAGNOSIS — E78.5 HYPERLIPIDEMIA, UNSPECIFIED HYPERLIPIDEMIA TYPE: ICD-10-CM

## 2024-05-21 DIAGNOSIS — E11.42 DIABETIC POLYNEUROPATHY ASSOCIATED WITH TYPE 2 DIABETES MELLITUS (HCC): ICD-10-CM

## 2024-05-21 DIAGNOSIS — L68.0 HIRSUTISM: ICD-10-CM

## 2024-05-21 DIAGNOSIS — E03.8 HYPOTHYROIDISM DUE TO HASHIMOTO'S THYROIDITIS: ICD-10-CM

## 2024-05-21 DIAGNOSIS — E06.3 HYPOTHYROIDISM DUE TO HASHIMOTO'S THYROIDITIS: ICD-10-CM

## 2024-05-21 DIAGNOSIS — I10 PRIMARY HYPERTENSION: ICD-10-CM

## 2024-05-21 DIAGNOSIS — E78.2 MIXED HYPERLIPIDEMIA: ICD-10-CM

## 2024-05-21 DIAGNOSIS — E03.9 ACQUIRED HYPOTHYROIDISM: ICD-10-CM

## 2024-05-21 DIAGNOSIS — E28.2 PCOS (POLYCYSTIC OVARIAN SYNDROME): ICD-10-CM

## 2024-05-21 DIAGNOSIS — E28.2 POLYCYSTIC OVARIAN SYNDROME: ICD-10-CM

## 2024-05-21 DIAGNOSIS — E66.01 MORBID OBESITY WITH BMI OF 45.0-49.9, ADULT (HCC): ICD-10-CM

## 2024-05-21 DIAGNOSIS — I10 ESSENTIAL HYPERTENSION: ICD-10-CM

## 2024-05-21 PROCEDURE — 99214 OFFICE O/P EST MOD 30 MIN: CPT | Performed by: INTERNAL MEDICINE

## 2024-05-21 PROCEDURE — 99214 OFFICE O/P EST MOD 30 MIN: CPT | Performed by: NURSE PRACTITIONER

## 2024-05-21 RX ORDER — FLUOXETINE HYDROCHLORIDE 40 MG/1
40 CAPSULE ORAL DAILY
Qty: 90 CAPSULE | Refills: 1 | Status: SHIPPED | OUTPATIENT
Start: 2024-05-21

## 2024-05-21 RX ORDER — LOSARTAN POTASSIUM 100 MG/1
100 TABLET ORAL DAILY
Qty: 90 TABLET | Refills: 1 | Status: SHIPPED | OUTPATIENT
Start: 2024-05-21

## 2024-05-21 RX ORDER — SPIRONOLACTONE 50 MG/1
50 TABLET, FILM COATED ORAL 2 TIMES DAILY
Qty: 180 TABLET | Refills: 3 | Status: SHIPPED | OUTPATIENT
Start: 2024-05-21

## 2024-05-21 RX ORDER — QUETIAPINE FUMARATE 50 MG/1
50 TABLET, FILM COATED ORAL
Qty: 90 TABLET | Refills: 1 | Status: SHIPPED | OUTPATIENT
Start: 2024-05-21

## 2024-05-21 RX ORDER — SIMVASTATIN 20 MG
20 TABLET ORAL
Qty: 90 TABLET | Refills: 3 | Status: SHIPPED | OUTPATIENT
Start: 2024-05-21

## 2024-05-21 RX ORDER — LEVOTHYROXINE SODIUM 112 UG/1
112 TABLET ORAL DAILY
Qty: 90 TABLET | Refills: 3 | Status: SHIPPED | OUTPATIENT
Start: 2024-05-21

## 2024-05-21 NOTE — PATIENT INSTRUCTIONS
Hgbac is 7.1%. this is not bad, would like it less than 7.    Continue the same metformin.     Work on diet and limiting sweets and carb's.    The thyroid is normal.     The cholesterol is ok.     Follow up in 6 months with blood work.

## 2024-05-21 NOTE — PROGRESS NOTES
Ambulatory Visit  Name: Monika Flanagan      : 1960      MRN: [id number]  Encounter Provider: JAYSON Elizondo  Encounter Date: 2024   Encounter department: [facility] [address]     Assessment & Plan   1. Type 2 diabetes mellitus without complication, without long-term current use of insulin ([facility])  Assessment & Plan:    Lab Results   Component Value Date    HGBA1C 7.1 (H) 2024     Well controlled as managed by endocrine - f/u UTD as of today  Foot exam completed by myself  Update eye exam at next appt  Microalbumin UTD  Continue ARB and statin daily   Maintain endo f/u as recommended  Orders:  -     Lipid Panel with Direct LDL reflex; Future; Expected date: 2024  -     Lipid Panel with Direct LDL reflex  2. Primary hypertension  Assessment & Plan:  BP well controlled   Continue same meds as rx'd  Return in 6 months  3. Mixed hyperlipidemia  Assessment & Plan:  FLP at goal on daily statin  Continue same dose as rx'd & recheck FLP in 6 months  Orders:  -     Lipid Panel with Direct LDL reflex; Future; Expected date: 2024  -     Lipid Panel with Direct LDL reflex  4. Dry skin dermatitis  Comments:  advise non-fragranced, water based moisturizer (aquaphor or eucerin), use low potency steroid as needed for rash flares  Orders:  -     hydrocortisone 2.5 % cream; Apply topically 2 (two) times a day  5. Morbid obesity with BMI of 45.0-49.9, adult ([facility])  Assessment & Plan:  Pt is interested in and contemplating start of injectable weight loss medication - risks vs benefits discussed & she wishes to do further research before agreeing to  Given her multiple co-morbidities, I feel this could be beneficial for her  Lengthy discussion & questions answered to pt satisfaction. Advise she reach out to initiate treatment should she decide to proceed  6. Hypothyroidism due to Hashimoto's thyroiditis  Assessment & Plan:  Managed by endo  Maintain f/u as recommended    She  continues to decline preventative needs - mammo, colon screening & pap. As usual, will discuss further & recommend again at next appt    Depression Screening and Follow-up Plan: Patient was screened for depression during today's encounter. They screened negative with a PHQ-9 score of 4.        History of Present Illness         Recently moved into [facility] and she has been happier there.   Had endocrine appointment before this appt. She states lab results were reviewed and all is OK. Denies change in regimen.    She questions starting weight loss medication. States she has been overweight most of her life.           Review of Systems   Constitutional: Negative.    Respiratory: Negative.     Cardiovascular:  Positive for leg swelling (bilat feet some days).   Skin:  Positive for rash (dry itchy skin through winter, used alot of bath and body works moisturizer).   Psychiatric/Behavioral:  Negative for dysphoric mood.        Past Medical History:   Diagnosis Date    Anxiety     Arthritis     last assessed: June 4, 2012    Depression     Diabetes (HCC)     mellitus - Resolved: Nov 18, 2015    Hashimoto's thyroiditis      Past Surgical History:   Procedure Laterality Date    CATARACT EXTRACTION Left     FOOT SURGERY Left     plantars wart removed    TONSILLECTOMY       Family History   Problem Relation Age of Onset    Diabetes Mother         mellitus     Diabetes type II Mother     Mental illness Mother     Esophageal cancer Father     No Known Problems Maternal Grandmother     No Known Problems Maternal Grandfather     No Known Problems Paternal Grandmother     No Known Problems Paternal Grandfather     No Known Problems Brother     No Known Problems Brother     No Known Problems Brother     Cancer Maternal Aunt         unknown primary    No Known Problems Maternal Aunt     No Known Problems Paternal Aunt     No Known Problems Paternal Aunt     No Known Problems Paternal Aunt      Social History     Tobacco Use     Smoking status: Never    Smokeless tobacco: Never   Vaping Use    Vaping status: Never Used   Substance and Sexual Activity    Alcohol use: No    Drug use: No    Sexual activity: Not Currently     Current Outpatient Medications on File Prior to Visit   Medication Sig    betamethasone dipropionate (DIPROSONE) 0.05 % cream apply topically to affected area twice a day    cholecalciferol (VITAMIN D3) 1,000 units tablet Take by mouth daily      levothyroxine 112 mcg tablet Take 1 tablet (112 mcg total) by mouth daily    metFORMIN (GLUCOPHAGE) 500 mg tablet Take 2 tablets (1,000 mg total) by mouth 2 (two) times a day with meals    MULTIPLE VITAMIN PO Take by mouth daily      Naproxen Sodium (ALEVE PO) Take by mouth    simvastatin (ZOCOR) 20 mg tablet Take 1 tablet (20 mg total) by mouth daily at bedtime    spironolactone (ALDACTONE) 50 mg tablet Take 1 tablet (50 mg total) by mouth 2 (two) times a day    [DISCONTINUED] FLUoxetine (PROzac) 40 MG capsule take 1 capsule by mouth once daily    [DISCONTINUED] losartan (COZAAR) 100 MG tablet take 1 tablet by mouth once daily    [DISCONTINUED] QUEtiapine (SEROquel) 50 mg tablet Take 1 tablet (50 mg total) by mouth daily at bedtime    [DISCONTINUED] levothyroxine 112 mcg tablet take 1 tablet by mouth once daily    [DISCONTINUED] metFORMIN (GLUCOPHAGE) 500 mg tablet Take 2 tablets (1,000 mg total) by mouth 2 (two) times a day with meals    [DISCONTINUED] simvastatin (ZOCOR) 20 mg tablet Take 1 tablet (20 mg total) by mouth daily at bedtime    [DISCONTINUED] spironolactone (ALDACTONE) 50 mg tablet Take 1 tablet (50 mg total) by mouth 2 (two) times a day     Allergies   Allergen Reactions    Lisinopril Cough     Reaction Date: 22Nov2011;     Losartan Eye Swelling     Immunization History   Administered Date(s) Administered    COVID-19 MODERNA VACC 0.5 ML IM 02/05/2021, 02/26/2021, 12/17/2021, 12/17/2021    INFLUENZA 10/02/2019, 10/15/2020, 10/21/2021, 11/08/2022    Influenza  "Injectable, MDCK, Preservative Free, Quadrivalent, 0.5 mL 10/15/2018    Influenza, injectable, quadrivalent, preservative free 0.5 mL 11/20/2023    Influenza, seasonal, injectable 10/09/2014, 09/11/2015, 10/05/2017    Influenza, seasonal, injectable, preservative free 10/10/2016    Pneumococcal Conjugate Vaccine 20-valent (Pcv20), Polysace 11/08/2022    Pneumococcal Polysaccharide PPV23 10/05/2017    Tdap 12/02/2011, 11/08/2022    Zoster 10/10/2016    Zoster Vaccine Recombinant 04/02/2018, 06/15/2018    influenza, injectable, quadrivalent 10/15/2020     Objective     /72   Pulse 74   Temp 97.8 °F (36.6 °C)   Ht 5' 4\" (1.626 m)   Wt 124 kg (272 lb 12.8 oz)   BMI 46.83 kg/m²       Physical Exam  Vitals reviewed.   Constitutional:       General: She is not in acute distress.     Appearance: Normal appearance. She is obese.   Eyes:      General: No scleral icterus.  Cardiovascular:      Rate and Rhythm: Normal rate and regular rhythm.      Pulses: no weak pulses.           Dorsalis pedis pulses are 1+ on the right side and 1+ on the left side.        Posterior tibial pulses are 1+ on the right side and 1+ on the left side.      Heart sounds: No murmur heard.  Pulmonary:      Effort: Pulmonary effort is normal. No respiratory distress.      Breath sounds: Normal breath sounds.   Musculoskeletal:      Cervical back: Normal range of motion.      Right lower leg: Edema (trace pedal) present.      Left lower leg: Edema (trace pedal) present.   Feet:      Right foot:      Skin integrity: No ulcer, skin breakdown, erythema, warmth, callus or dry skin.      Left foot:      Skin integrity: No ulcer, skin breakdown, erythema, warmth, callus or dry skin.   Lymphadenopathy:      Cervical: No cervical adenopathy.   Skin:     General: Skin is warm and dry.      Findings: No rash.          Neurological:      General: No focal deficit present.      Mental Status: She is alert and oriented to person, place, and time.      " Gait: Gait normal.   Psychiatric:         Mood and Affect: Mood normal.         Behavior: Behavior normal.         Thought Content: Thought content normal.         Judgment: Judgment normal.         Patient's shoes and socks removed.    Right Foot/Ankle   Right Foot Inspection  Skin Exam: skin normal and skin intact. No dry skin, no warmth, no callus, no erythema, no maceration, no abnormal color, no pre-ulcer, no ulcer and no callus.     Toe Exam: ROM and strength within normal limits.     Sensory   Vibration: intact  Proprioception: intact  Monofilament testing: intact    Vascular  The right DP pulse is 1+. The right PT pulse is 1+.     Right Toe  - Comprehensive Exam  Ecchymosis: none  Arch: normal  Hammertoes: absent  Claw Toes: absent  Swelling: none   Tenderness: none       Left Foot/Ankle  Left Foot Inspection  Skin Exam: skin normal and skin intact. No dry skin, no warmth, no erythema, no maceration, normal color, no pre-ulcer, no ulcer and no callus.     Toe Exam: ROM and strength within normal limits.     Sensory   Vibration: intact  Proprioception: intact  Monofilament testing: intact    Vascular  The left DP pulse is 1+. The left PT pulse is 1+.     Left Toe  - Comprehensive Exam  Ecchymosis: none  Arch: normal  Hammertoes: absent  Claw toes: absent  Swelling: none   Tenderness: none       Assign Risk Category  No deformity present  No loss of protective sensation  No weak pulses  Risk: 0       Administrative Statements   I have spent a total time of 45 minutes on 05/21/24 In caring for this patient including Diagnostic results, Risks and benefits of tx options, Instructions for management, Patient and family education, Importance of tx compliance, Impressions, Counseling / Coordination of care, Documenting in the medical record, Reviewing / ordering tests, medicine, procedures  , and Obtaining or reviewing history  .

## 2024-05-21 NOTE — ASSESSMENT & PLAN NOTE
Pt is interested in and contemplating start of injectable weight loss medication - risks vs benefits discussed & she wishes to do further research before agreeing to  Given her multiple co-morbidities, I feel this could be beneficial for her  Lengthy discussion & questions answered to pt satisfaction. Advise she reach out to initiate treatment should she decide to proceed

## 2024-05-21 NOTE — PROGRESS NOTES
5/21/2024    Assessment & Plan      Diagnoses and all orders for this visit:    Type 2 diabetes mellitus without complication, without long-term current use of insulin (HCC)  -     Comprehensive metabolic panel; Future  -     Hemoglobin A1C; Future  -     TSH, 3rd generation; Future  -     T4, free; Future  -     Comprehensive metabolic panel  -     Hemoglobin A1C  -     TSH, 3rd generation  -     T4, free  -     metFORMIN (GLUCOPHAGE) 500 mg tablet; Take 2 tablets (1,000 mg total) by mouth 2 (two) times a day with meals    Diabetic polyneuropathy associated with type 2 diabetes mellitus (HCC)  -     Comprehensive metabolic panel; Future  -     Hemoglobin A1C; Future  -     TSH, 3rd generation; Future  -     T4, free; Future  -     Comprehensive metabolic panel  -     Hemoglobin A1C  -     TSH, 3rd generation  -     T4, free    Polycystic ovarian syndrome  -     Comprehensive metabolic panel; Future  -     Hemoglobin A1C; Future  -     TSH, 3rd generation; Future  -     T4, free; Future  -     Comprehensive metabolic panel  -     Hemoglobin A1C  -     TSH, 3rd generation  -     T4, free    Hypothyroidism due to Hashimoto's thyroiditis  -     Comprehensive metabolic panel; Future  -     Hemoglobin A1C; Future  -     TSH, 3rd generation; Future  -     T4, free; Future  -     Comprehensive metabolic panel  -     Hemoglobin A1C  -     TSH, 3rd generation  -     T4, free    Primary hypertension  -     Comprehensive metabolic panel; Future  -     Hemoglobin A1C; Future  -     TSH, 3rd generation; Future  -     T4, free; Future  -     Comprehensive metabolic panel  -     Hemoglobin A1C  -     TSH, 3rd generation  -     T4, free    Mixed hyperlipidemia  -     Comprehensive metabolic panel; Future  -     Hemoglobin A1C; Future  -     TSH, 3rd generation; Future  -     T4, free; Future  -     Comprehensive metabolic panel  -     Hemoglobin A1C  -     TSH, 3rd generation  -     T4, free    Morbid obesity with BMI of 45.0-49.9,  adult (HCC)  -     Comprehensive metabolic panel; Future  -     Hemoglobin A1C; Future  -     TSH, 3rd generation; Future  -     T4, free; Future  -     Comprehensive metabolic panel  -     Hemoglobin A1C  -     TSH, 3rd generation  -     T4, free    Acquired hypothyroidism  -     levothyroxine 112 mcg tablet; Take 1 tablet (112 mcg total) by mouth daily    Hyperlipidemia, unspecified hyperlipidemia type  -     simvastatin (ZOCOR) 20 mg tablet; Take 1 tablet (20 mg total) by mouth daily at bedtime    Hirsutism  -     spironolactone (ALDACTONE) 50 mg tablet; Take 1 tablet (50 mg total) by mouth 2 (two) times a day    PCOS (polycystic ovarian syndrome)  -     spironolactone (ALDACTONE) 50 mg tablet; Take 1 tablet (50 mg total) by mouth 2 (two) times a day          Assessment & Plan  1. Type 2 diabetes.  The patient's most recent hemoglobin A1c level is 7.1 percent, indicating stability and reasonable control of her diabetes. However, it is prudent to maintain her hemoglobin A1c level below 7. The patient will maintain her current regimen of metformin 1000 mg twice daily. We have discussed the importance of dietary modifications, including reducing her intake of sweets and carbohydrates without elimination, as I do not believe she will ever eliminate them. Additionally, she is encouraged to focus on regular exercise.    2. Diabetic neuropathy.  A diabetic foot exam was performed in the office today.    3. Hypothyroidism due to Hashimoto's thyroiditis.  The patient's most recent thyroid function studies are normal, indicating biochemically and clinically euthyroid status. The patient will maintain her current regimen of levothyroxine 112 mcg daily.    4. Polycystic ovary syndrome.  The patient will continue her efforts towards dietary changes and exercise with reduced carbohydrates and sweets. She will also continue her current regimen of spironolactone and metformin.    5. Hypertension.  The patient's blood pressure  was normotensive during today's office visit. The patient will persist with her current regimen of losartan 100 mg daily and spironolactone.    6. Hyperlipidemia.  Her lipid profile is under reasonable control, with a mildly elevated triglyceride level consistent with her PCOS and type 2 diabetes status. The patient will continue her efforts towards reducing her carbohydrate intake and continue her current regimen of simvastatin 20 mg daily.    Follow-up  The patient is scheduled for a follow-up visit in 6 months, at which time a preceding hemoglobin A1c, CMP, TSH, and free T4 will be conducted.    CC: Diabetes type II, blood pressure, lipid, thyroid, PCOS follow-up     History of Present Illness    HPI: Monika Flanagan  is a 63-year-old female who comes with type 2 diabetes with neuropathy, hypothyroidism, PCOS, hypertension, hyperlipidemia, for follow-up visit.     Diabetes complications include diabetic neuropathy. She denies nephropathy, retinopathy, heart attack, stroke, or claudication.    The patient is currently on a regimen of metformin, administered at a dosage of 1000 mg twice daily. She reports increased urinary frequency during the day, but denies nocturnal urination. She denies polydipsia or polyphagia.     She has decreased vision in her left eye, with her last eye examination conducted during the pandemic with Dr. Harding.     She denies peripheral neuropathy, including numbness or tingling in her feet, wounds, or wounds.  Last diabetic foot exam was May 2023.  She has not been monitoring her blood glucose levels at home, although she has experienced occasional shakiness when fasting for extended periods.    The patient reports pruritus and papules on her arms, abdomen, and ankles, which have been exacerbated by scratching. Initially, she developed dermatitis, which was erythematous and irritated, but has since resolved with the application of body lotion. The severity of these symptoms was particularly  severe during the winter months. She denies the presence of rashes, wounds, or numbness or tingling in her feet.    The patient is on a daily regimen of levothyroxine 112 mcg daily. She reports an increase in hair shedding, particularly on her chin and scalp, but notes a slight improvement. Her nails have always been thin, and she often trims them to prevent tearing. She denies experiencing temperature fluctuations, tachycardia, palpitations, hand tremors, diarrhea, or dyspnea. She does report constipation.    The patient's sleep is generally good, although she experiences daytime fatigue, which she attributes to her sleep apnea. She has undergone two sleep tests in a sleep lab and has tried a CPAP machine, but found it uncomfortable and disrupted her sleep and energy levels. She has been prescribed a low dose of Seroquel for sleep disturbances caused by stress.    The patient is on a daily regimen of simvastatin 20 mg for cholesterol management. She denies experiencing chest pain, dyspnea, headaches, lightheadedness, or dizziness. She reports occasional dizziness and balance issues, which she attributes to her age.    The patient is on losartan 100 mg daily for hypertension.    Blood Sugar/Glucometer/Pump/CGM review: She has not been monitoring her blood glucose levels at home, although she has experienced occasional shakiness when fasting for extended periods.      Historical Information   Past Medical History:   Diagnosis Date    Anxiety     Arthritis     last assessed: June 4, 2012    Depression     Diabetes (HCC)     mellitus - Resolved: Nov 18, 2015    Hashimoto's thyroiditis      Past Surgical History:   Procedure Laterality Date    CATARACT EXTRACTION Left     FOOT SURGERY Left     plantars wart removed    TONSILLECTOMY       Social History   Social History     Substance and Sexual Activity   Alcohol Use No     Social History     Substance and Sexual Activity   Drug Use No     Social History     Tobacco Use    Smoking Status Never   Smokeless Tobacco Never     Family History:   Family History   Problem Relation Age of Onset    Diabetes Mother         mellitus     Diabetes type II Mother     Mental illness Mother     Esophageal cancer Father     No Known Problems Maternal Grandmother     No Known Problems Maternal Grandfather     No Known Problems Paternal Grandmother     No Known Problems Paternal Grandfather     No Known Problems Brother     No Known Problems Brother     No Known Problems Brother     Cancer Maternal Aunt         unknown primary    No Known Problems Maternal Aunt     No Known Problems Paternal Aunt     No Known Problems Paternal Aunt     No Known Problems Paternal Aunt        Meds/Allergies   Current Outpatient Medications   Medication Sig Dispense Refill    betamethasone dipropionate (DIPROSONE) 0.05 % cream apply topically to affected area twice a day 45 g 0    cholecalciferol (VITAMIN D3) 1,000 units tablet Take by mouth daily        FLUoxetine (PROzac) 40 MG capsule take 1 capsule by mouth once daily 90 capsule 1    levothyroxine 112 mcg tablet Take 1 tablet (112 mcg total) by mouth daily 90 tablet 3    losartan (COZAAR) 100 MG tablet take 1 tablet by mouth once daily 90 tablet 1    metFORMIN (GLUCOPHAGE) 500 mg tablet Take 2 tablets (1,000 mg total) by mouth 2 (two) times a day with meals 360 tablet 3    MULTIPLE VITAMIN PO Take by mouth daily        Naproxen Sodium (ALEVE PO) Take by mouth      QUEtiapine (SEROquel) 50 mg tablet Take 1 tablet (50 mg total) by mouth daily at bedtime 30 tablet 5    simvastatin (ZOCOR) 20 mg tablet Take 1 tablet (20 mg total) by mouth daily at bedtime 90 tablet 3    spironolactone (ALDACTONE) 50 mg tablet Take 1 tablet (50 mg total) by mouth 2 (two) times a day 180 tablet 3    hydrocortisone 2.5 % cream Apply topically 2 (two) times a day 28 g 2     No current facility-administered medications for this visit.     Allergies   Allergen Reactions    Lisinopril Cough      "Reaction Date: 22Nov2011;     Losartan Eye Swelling       Objective   Vitals: Blood pressure 114/70, pulse 82, height 5' 4\" (1.626 m), weight 123 kg (272 lb).  Invasive Devices       None                   Physical Exam  Physical exam normal with pertinent positives and negatives.    No lid lag, stare, proptosis, or periorbital edema noted in the eyes.  Thyroid in the neck is normal in size with no palpable nodules.  Lungs are clear to auscultation.  Heart has a regular rate and rhythm with no murmurs.  No tremor observed in the outstretched hands. Trace bilateral lower extremity edema observed in the lower extremities. Dorsalis pedis and posterior tibialis pulses are 2 +. Anterior right shin shows excoriations from scratching. Vibration sensation is slightly diminished to the first toe DIP joint on both sides. Monofilament sensation intact except to both heels.  Patellar deep tendon reflexes are normal.    Patient's shoes and socks removed.    Right Foot/Ankle   Right Foot Inspection  Skin Exam: skin normal. Skin not intact, no dry skin, no warmth, no callus, no erythema, no maceration, no abnormal color, no pre-ulcer, no ulcer and no callus.     Toe Exam: swelling.  no right toe deformity    Sensory   Vibration: diminished  Monofilament testing: intact    Vascular  Capillary refills: < 3 seconds  The right DP pulse is 2+. The right PT pulse is 2+.     Left Foot/Ankle  Left Foot Inspection  Skin Exam: skin normal and skin intact. No dry skin, no warmth, no erythema, no maceration, normal color, no pre-ulcer, no ulcer and no callus.     Toe Exam: swelling. No left toe deformity.     Sensory   Vibration: diminished  Monofilament testing: intact    Vascular  Capillary refills: < 3 seconds  The left DP pulse is 2+. The left PT pulse is 2+.     Assign Risk Category  No deformity present  Loss of protective sensation  No weak pulses  Risk: 1        The history was obtained from the review of the chart and from the " patient.    Lab Results:    Most recent Alc is  Lab Results   Component Value Date    HGBA1C 7.1 (H) 05/08/2024           Blood work performed on 5/8/2024 showed a urine microalbumin to creatinine ratio of 10.    CBC is normal.    CMP showed a glucose of 108 fasting but was otherwise normal.    Lab Results   Component Value Date    CREATININE 0.64 05/08/2024    CREATININE 0.65 11/08/2023    CREATININE 0.74 05/09/2023    BUN 14 05/08/2024     08/24/2017    K 4.5 05/08/2024    CL 99 05/08/2024    CO2 24 05/08/2024     eGFR   Date Value Ref Range Status   05/08/2024 100 >59 mL/min/1.73 Final     Total cholesterol 129, LDL cholesterol 58.    Lab Results   Component Value Date    CHOL 166 08/24/2017    HDL 40 05/08/2024    TRIG 185 (H) 05/08/2024    CHOLHDL 3.5 05/09/2023       Lab Results   Component Value Date    ALT 24 05/08/2024    AST 36 05/08/2024    ALKPHOS 58 08/24/2017    BILITOT 0.2 08/24/2017       Lab Results   Component Value Date    TSH 1.080 05/08/2024    FREET4 1.44 05/08/2024             Future Appointments   Date Time Provider Department Center   11/22/2024  3:20 PM JAYSON Elizondo [facility]  [address] [facility]   11/25/2024  3:00 PM Monika Vazquez MD [facility]

## 2024-05-21 NOTE — ASSESSMENT & PLAN NOTE
Lab Results   Component Value Date    HGBA1C 7.1 (H) 05/08/2024     Well controlled as managed by endocrine - f/u UTD as of today  Foot exam completed by myself  Update eye exam at next appt  Microalbumin UTD  Continue ARB and statin daily   Maintain endo f/u as recommended

## 2024-10-22 NOTE — ASSESSMENT & PLAN NOTE
Continue f/u w/therapist and psychiatry  Discussed EMU w/ patient, she is aware an adult is required to accompany her for the duration including overnight. She needs to speak with her family; will call me back, has my direct line as POC.

## 2024-11-12 LAB
ALBUMIN SERPL-MCNC: 4.3 G/DL (ref 3.9–4.9)
ALP SERPL-CCNC: 59 IU/L (ref 44–121)
ALT SERPL-CCNC: 24 IU/L (ref 0–32)
AST SERPL-CCNC: 33 IU/L (ref 0–40)
BILIRUB SERPL-MCNC: 0.4 MG/DL (ref 0–1.2)
BUN SERPL-MCNC: 15 MG/DL (ref 8–27)
BUN/CREAT SERPL: 21 (ref 12–28)
CALCIUM SERPL-MCNC: 9.5 MG/DL (ref 8.7–10.3)
CHLORIDE SERPL-SCNC: 96 MMOL/L (ref 96–106)
CHOLEST SERPL-MCNC: 137 MG/DL (ref 100–199)
CO2 SERPL-SCNC: 26 MMOL/L (ref 20–29)
CREAT SERPL-MCNC: 0.73 MG/DL (ref 0.57–1)
EGFR: 93 ML/MIN/1.73
EST. AVERAGE GLUCOSE BLD GHB EST-MCNC: 169 MG/DL
GLOBULIN SER-MCNC: 2.1 G/DL (ref 1.5–4.5)
GLUCOSE SERPL-MCNC: 120 MG/DL (ref 70–99)
HBA1C MFR BLD: 7.5 % (ref 4.8–5.6)
HDLC SERPL-MCNC: 39 MG/DL
LDLC SERPL CALC-MCNC: 69 MG/DL (ref 0–99)
LDLC/HDLC SERPL: 1.8 RATIO (ref 0–3.2)
POTASSIUM SERPL-SCNC: 4.9 MMOL/L (ref 3.5–5.2)
PROT SERPL-MCNC: 6.4 G/DL (ref 6–8.5)
SL AMB VLDL CHOLESTEROL CALC: 29 MG/DL (ref 5–40)
SODIUM SERPL-SCNC: 138 MMOL/L (ref 134–144)
T4 FREE SERPL-MCNC: 1.26 NG/DL (ref 0.82–1.77)
TRIGL SERPL-MCNC: 169 MG/DL (ref 0–149)
TSH SERPL DL<=0.005 MIU/L-ACNC: 4.05 UIU/ML (ref 0.45–4.5)

## 2024-11-13 ENCOUNTER — RESULTS FOLLOW-UP (OUTPATIENT)
Dept: ENDOCRINOLOGY | Facility: HOSPITAL | Age: 64
End: 2024-11-13

## 2024-11-13 DIAGNOSIS — F33.42 RECURRENT MAJOR DEPRESSIVE DISORDER, IN FULL REMISSION (HCC): ICD-10-CM

## 2024-11-14 DIAGNOSIS — I10 ESSENTIAL HYPERTENSION: ICD-10-CM

## 2024-11-14 RX ORDER — LOSARTAN POTASSIUM 100 MG/1
100 TABLET ORAL DAILY
Qty: 90 TABLET | Refills: 1 | Status: SHIPPED | OUTPATIENT
Start: 2024-11-14

## 2024-11-14 RX ORDER — FLUOXETINE 40 MG/1
40 CAPSULE ORAL DAILY
Qty: 90 CAPSULE | Refills: 1 | Status: SHIPPED | OUTPATIENT
Start: 2024-11-14

## 2024-11-17 DIAGNOSIS — F33.42 RECURRENT MAJOR DEPRESSIVE DISORDER, IN FULL REMISSION (HCC): ICD-10-CM

## 2024-11-18 RX ORDER — QUETIAPINE FUMARATE 50 MG/1
50 TABLET, FILM COATED ORAL
Qty: 90 TABLET | Refills: 1 | Status: SHIPPED | OUTPATIENT
Start: 2024-11-18

## 2024-11-22 ENCOUNTER — OFFICE VISIT (OUTPATIENT)
Dept: FAMILY MEDICINE CLINIC | Facility: HOSPITAL | Age: 64
End: 2024-11-22
Payer: COMMERCIAL

## 2024-11-22 ENCOUNTER — RESULTS FOLLOW-UP (OUTPATIENT)
Dept: FAMILY MEDICINE CLINIC | Facility: HOSPITAL | Age: 64
End: 2024-11-22

## 2024-11-22 VITALS
SYSTOLIC BLOOD PRESSURE: 120 MMHG | HEART RATE: 76 BPM | HEIGHT: 64 IN | DIASTOLIC BLOOD PRESSURE: 76 MMHG | TEMPERATURE: 97.8 F | WEIGHT: 279.6 LBS | BODY MASS INDEX: 47.73 KG/M2

## 2024-11-22 DIAGNOSIS — I10 PRIMARY HYPERTENSION: Primary | ICD-10-CM

## 2024-11-22 DIAGNOSIS — E78.1 PURE HYPERTRIGLYCERIDEMIA: ICD-10-CM

## 2024-11-22 DIAGNOSIS — Z00.00 ANNUAL PHYSICAL EXAM: ICD-10-CM

## 2024-11-22 DIAGNOSIS — E28.39 OVARIAN FAILURE DUE TO MENOPAUSE: Primary | ICD-10-CM

## 2024-11-22 DIAGNOSIS — E11.9 TYPE 2 DIABETES MELLITUS WITHOUT COMPLICATION, WITHOUT LONG-TERM CURRENT USE OF INSULIN (HCC): ICD-10-CM

## 2024-11-22 PROCEDURE — 99396 PREV VISIT EST AGE 40-64: CPT | Performed by: NURSE PRACTITIONER

## 2024-11-22 PROCEDURE — 99214 OFFICE O/P EST MOD 30 MIN: CPT | Performed by: NURSE PRACTITIONER

## 2024-11-22 NOTE — ASSESSMENT & PLAN NOTE
Lab Results   Component Value Date    HGBA1C 7.5 (H) 11/11/2024     A1C up from 7.1% likely due to diet indiscretion  F/U with endo as scheduled next week  IRIS exam done in office - advise she establish w/ophthalmology  Foot exam UTD   Continue daily ARB and statin    Orders:    IRIS Diabetic eye exam

## 2024-11-22 NOTE — PATIENT INSTRUCTIONS
"Patient Education     Routine physical for adults   The Basics   Written by the doctors and editors at [facility]   What is a physical? -- A physical is a routine visit, or \"check-up,\" with your doctor. You might also hear it called a \"wellness visit\" or \"preventive visit.\"  During each visit, the doctor will:   Ask about your physical and mental health   Ask about your habits, behaviors, and lifestyle   Do an exam   Give you vaccines if needed   Talk to you about any medicines you take   Give advice about your health   Answer your questions  Getting regular check-ups is an important part of taking care of your health. It can help your doctor find and treat any problems you have. But it's also important for preventing health problems.  A routine physical is different from a \"sick visit.\" A sick visit is when you see a doctor because of a health concern or problem. Since physicals are scheduled ahead of time, you can think about what you want to ask the doctor.  How often should I get a physical? -- It depends on your age and health. In general, for people age 21 years and older:   If you are younger than 50 years, you might be able to get a physical every 3 years.   If you are 50 years or older, your doctor might recommend a physical every year.  If you have an ongoing health condition, like diabetes or high blood pressure, your doctor will probably want to see you more often.  What happens during a physical? -- In general, each visit will include:   Physical exam - The doctor or nurse will check your height, weight, heart rate, and blood pressure. They will also look at your eyes and ears. They will ask about how you are feeling and whether you have any symptoms that bother you.   Medicines - It's a good idea to bring a list of all the medicines you take to each doctor visit. Your doctor will talk to you about your medicines and answer any questions. Tell them if you are having any side effects that bother you. You " "should also tell them if you are having trouble paying for any of your medicines.   Habits and behaviors - This includes:   Your diet   Your exercise habits   Whether you smoke, drink alcohol, or use drugs   Whether you are sexually active   Whether you feel safe at home  Your doctor will talk to you about things you can do to improve your health and lower your risk of health problems. They will also offer help and support. For example, if you want to quit smoking, they can give you advice and might prescribe medicines. If you want to improve your diet or get more physical activity, they can help you with this, too.   Lab tests, if needed - The tests you get will depend on your age and situation. For example, your doctor might want to check your:   Cholesterol   Blood sugar   Iron level   Vaccines - The recommended vaccines will depend on your age, health, and what vaccines you already had. Vaccines are very important because they can prevent certain serious or deadly infections.   Discussion of screening - \"Screening\" means checking for diseases or other health problems before they cause symptoms. Your doctor can recommend screening based on your age, risk, and preferences. This might include tests to check for:   Cancer, such as breast, prostate, cervical, ovarian, colorectal, prostate, lung, or skin cancer   Sexually transmitted infections, such as chlamydia and gonorrhea   Mental health conditions like depression and anxiety  Your doctor will talk to you about the different types of screening tests. They can help you decide which screenings to have. They can also explain what the results might mean.   Answering questions - The physical is a good time to ask the doctor or nurse questions about your health. If needed, they can refer you to other doctors or specialists, too.  Adults older than 65 years often need other care, too. As you get older, your doctor will talk to you about:   How to prevent falling at " home   Hearing or vision tests   Memory testing   How to take your medicines safely   Making sure that you have the help and support you need at home  All topics are updated as new evidence becomes available and our peer review process is complete.  This topic retrieved from [email or web address] on: May 02, 2024.  Topic 080827 Version 1.0  Release: 32.4.3 - C32.122  © 2024 UpToDate, Inc. and/or its affiliates. All rights reserved.  Consumer Information Use and Disclaimer   Disclaimer: This generalized information is a limited summary of diagnosis, treatment, and/or medication information. It is not meant to be comprehensive and should be used as a tool to help the user understand and/or assess potential diagnostic and treatment options. It does NOT include all information about conditions, treatments, medications, side effects, or risks that may apply to a specific patient. It is not intended to be medical advice or a substitute for the medical advice, diagnosis, or treatment of a health care provider based on the health care provider's examination and assessment of a patient's specific and unique circumstances. Patients must speak with a health care provider for complete information about their health, medical questions, and treatment options, including any risks or benefits regarding use of medications. This information does not endorse any treatments or medications as safe, effective, or approved for treating a specific patient. UpToDate, Inc. and its affiliates disclaim any warranty or liability relating to this information or the use thereof.The use of this information is governed by the Terms of Use, available at https://www.woltersThinkHRuwer.com/en/know/clinical-effectiveness-terms. 2024© UpToDate, Inc. and its affiliates and/or licensors. All rights reserved.  Copyright   © 2024 UpToDate, Inc. and/or its affiliates. All rights reserved.

## 2024-11-22 NOTE — PROGRESS NOTES
Adult Annual Physical  Name: Monika Flanagan      : 1960      MRN: [id number]  Encounter Provider: JAYSON Elizondo  Encounter Date: 2024   Encounter department: [facility] [address]     Assessment & Plan  Primary hypertension  Well controlled  Continue same meds as rx'd  Return in 6 months       Type 2 diabetes mellitus without complication, without long-term current use of insulin (HCC)    Lab Results   Component Value Date    HGBA1C 7.5 (H) 2024     A1C up from 7.1% likely due to diet indiscretion  F/U with endo as scheduled next week  IRIS exam done in office - advise she establish w/ophthalmology  Foot exam UTD   Continue daily ARB and statin    Orders:    IRIS Diabetic eye exam    Pure hypertriglyceridemia  Stable/acceptable FLP on daily statin  Continue same dose & improve diet  Recheck before next OV in 6 months     Orders:    Lipid Panel with Direct LDL reflex; Future    CBC and Platelet; Future    Annual physical exam  PE updated, next due in 1 year  Again discussed/recommend updating preventative needs - mammo previously ordered & reprinted, dexa ordered  She continues to decline colon screening and will consider further by next appt       Immunizations and preventive care screenings were discussed with patient today. Appropriate education was printed on patient's after visit summary.    Counseling:  Dental Health: discussed importance of regular tooth brushing, flossing, and dental visits.         History of Present Illness         Adult Annual Physical:  Patient presents for annual physical. States she has been pretty good.   Had dry itchy eczema skin over the summer but has improved using rx cream at times. Doing better with moisturizer regularly.   She moved to [place] in April and is happier. Continues w/transportation limitations not having a car and utilizing [employer] for appointments.   Overdue for eye exam. Not seen by eye MD recently..     Diet and  "Physical Activity:  - Diet/Nutrition: diabetic diet.    General Health:    - Hearing: normal hearing bilateral ears.  - Vision: most recent eye exam > 1 year ago and wears glasses.  - Dental: no dental visits for > 1 year.    /GYN Health:  - Follows with GYN: no.   - Menopause: postmenopausal.         Review of Systems   Constitutional: Negative.    Respiratory: Negative.     Cardiovascular: Negative.    Gastrointestinal: Negative.    Psychiatric/Behavioral:  Negative for dysphoric mood.          Objective   /76   Pulse 76   Temp 97.8 °F (36.6 °C)   Ht 5' 4\" (1.626 m)   Wt 127 kg (279 lb 9.6 oz)   BMI 47.99 kg/m²       Physical Exam  Vitals reviewed.   Constitutional:       General: She is not in acute distress.     Appearance: Normal appearance. She is obese.   Eyes:      General: No scleral icterus.  Neck:      Vascular: No carotid bruit.   Cardiovascular:      Rate and Rhythm: Normal rate and regular rhythm.   Pulmonary:      Effort: Pulmonary effort is normal. No respiratory distress.      Breath sounds: Normal breath sounds.   Musculoskeletal:      Cervical back: Normal range of motion.      Right lower leg: No edema.      Left lower leg: No edema.   Lymphadenopathy:      Cervical: No cervical adenopathy.   Skin:     General: Skin is warm and dry.      Findings: No rash.   Neurological:      General: No focal deficit present.      Mental Status: She is alert and oriented to person, place, and time.   Psychiatric:         Mood and Affect: Mood normal.         Behavior: Behavior normal.         Thought Content: Thought content normal.         Judgment: Judgment normal.         Administrative Statements   I have spent a total time of 30 minutes in caring for this patient on the day of the visit/encounter including Diagnostic results, Risks and benefits of tx options, Instructions for management, Patient and family education, Risk factor reductions, Impressions, Counseling / Coordination of care, " Documenting in the medical record, Reviewing / ordering tests, medicine, procedures  , and Obtaining or reviewing history

## 2024-11-22 NOTE — ASSESSMENT & PLAN NOTE
Stable/acceptable FLP on daily statin  Continue same dose & improve diet  Recheck before next OV in 6 months     Orders:    Lipid Panel with Direct LDL reflex; Future    CBC and Platelet; Future

## 2024-11-26 ENCOUNTER — TELEPHONE (OUTPATIENT)
Dept: ENDOCRINOLOGY | Facility: HOSPITAL | Age: 64
End: 2024-11-26

## 2024-11-29 DIAGNOSIS — E06.3 HYPOTHYROIDISM DUE TO HASHIMOTO'S THYROIDITIS: ICD-10-CM

## 2024-11-29 DIAGNOSIS — E11.9 TYPE 2 DIABETES MELLITUS WITHOUT COMPLICATION, WITHOUT LONG-TERM CURRENT USE OF INSULIN (HCC): Primary | ICD-10-CM

## 2024-11-29 DIAGNOSIS — E28.2 POLYCYSTIC OVARIAN SYNDROME: ICD-10-CM

## 2024-11-29 DIAGNOSIS — E11.42 DIABETIC POLYNEUROPATHY ASSOCIATED WITH TYPE 2 DIABETES MELLITUS (HCC): ICD-10-CM

## 2024-11-29 NOTE — TELEPHONE ENCOUNTER
Her blood work showed a hemoglobin A1c of 7.5% which is a bit higher than last time.  Her thyroid levels are normal.  Her liver and kidney function is normal.  Her cholesterol is stable if not slightly improved.    Continue the same levothyroxine dosage.  She needs to work aggressively on her diet to try to bring her blood sugars down on the current metformin.  If it does not improve next time, we will likely have to add another agent for her diabetes.    I will order her blood work for her next visit.  
Left patient a message in regards to blood work review and told patient I will send new labs in the mail.  
Patient had an appointment scheduled for yesterday at 3:00 pm.  She called at 3:00 and asked if she could do a virtual visit because her Lyft ride did not show up.  The request was denied.  Only wants to see you and wanted to reschedule her appointment to be on the same day as her next PCP follow up, since she does not have a car.    She had her labs done 11-11-24 and would like to know the results.  I rescheduled her for 5-19-25.  Do you need any additional labs done prior to that appointment?    Mail lab slip & appointment card to patient.  
[employer]

## 2025-05-05 DIAGNOSIS — F33.42 RECURRENT MAJOR DEPRESSIVE DISORDER, IN FULL REMISSION (HCC): ICD-10-CM

## 2025-05-05 RX ORDER — FLUOXETINE HYDROCHLORIDE 40 MG/1
40 CAPSULE ORAL DAILY
Qty: 90 CAPSULE | Refills: 1 | Status: SHIPPED | OUTPATIENT
Start: 2025-05-05

## 2025-05-09 ENCOUNTER — TELEPHONE (OUTPATIENT)
Age: 65
End: 2025-05-09

## 2025-05-09 NOTE — TELEPHONE ENCOUNTER
Monika was calling to see if a fax from [facility] [employer] made it to us. She uses them for a ride and they need a doctors note. She said they faxed the info request on 5/8.    Please let Monika know if/when this document arrives.

## 2025-05-19 ENCOUNTER — OFFICE VISIT (OUTPATIENT)
Dept: ENDOCRINOLOGY | Facility: HOSPITAL | Age: 65
End: 2025-05-19
Payer: COMMERCIAL

## 2025-05-19 ENCOUNTER — OFFICE VISIT (OUTPATIENT)
Dept: FAMILY MEDICINE CLINIC | Facility: HOSPITAL | Age: 65
End: 2025-05-19
Payer: COMMERCIAL

## 2025-05-19 VITALS
WEIGHT: 276.4 LBS | DIASTOLIC BLOOD PRESSURE: 76 MMHG | HEART RATE: 88 BPM | HEIGHT: 64 IN | OXYGEN SATURATION: 95 % | BODY MASS INDEX: 47.19 KG/M2 | SYSTOLIC BLOOD PRESSURE: 126 MMHG

## 2025-05-19 VITALS
HEART RATE: 92 BPM | BODY MASS INDEX: 47.12 KG/M2 | WEIGHT: 276 LBS | SYSTOLIC BLOOD PRESSURE: 128 MMHG | OXYGEN SATURATION: 95 % | DIASTOLIC BLOOD PRESSURE: 78 MMHG | HEIGHT: 64 IN

## 2025-05-19 DIAGNOSIS — E28.2 POLYCYSTIC OVARIAN SYNDROME: ICD-10-CM

## 2025-05-19 DIAGNOSIS — E11.9 TYPE 2 DIABETES MELLITUS WITHOUT COMPLICATION, WITHOUT LONG-TERM CURRENT USE OF INSULIN (HCC): Primary | ICD-10-CM

## 2025-05-19 DIAGNOSIS — E11.42 DIABETIC POLYNEUROPATHY ASSOCIATED WITH TYPE 2 DIABETES MELLITUS (HCC): ICD-10-CM

## 2025-05-19 DIAGNOSIS — L68.0 HIRSUTISM: ICD-10-CM

## 2025-05-19 DIAGNOSIS — E06.3 HYPOTHYROIDISM DUE TO HASHIMOTO'S THYROIDITIS: ICD-10-CM

## 2025-05-19 DIAGNOSIS — E03.9 ACQUIRED HYPOTHYROIDISM: ICD-10-CM

## 2025-05-19 DIAGNOSIS — Z12.31 ENCOUNTER FOR SCREENING MAMMOGRAM FOR BREAST CANCER: ICD-10-CM

## 2025-05-19 DIAGNOSIS — E28.2 PCOS (POLYCYSTIC OVARIAN SYNDROME): ICD-10-CM

## 2025-05-19 DIAGNOSIS — E78.2 MIXED HYPERLIPIDEMIA: ICD-10-CM

## 2025-05-19 DIAGNOSIS — Z12.11 SCREENING FOR COLON CANCER: ICD-10-CM

## 2025-05-19 DIAGNOSIS — I10 PRIMARY HYPERTENSION: ICD-10-CM

## 2025-05-19 DIAGNOSIS — E78.5 HYPERLIPIDEMIA, UNSPECIFIED HYPERLIPIDEMIA TYPE: ICD-10-CM

## 2025-05-19 DIAGNOSIS — R60.0 PEDAL EDEMA: ICD-10-CM

## 2025-05-19 LAB
LEFT EYE DIABETIC RETINOPATHY: ABNORMAL
LEFT EYE IMAGE QUALITY: ABNORMAL
LEFT EYE MACULAR EDEMA: ABNORMAL
LEFT EYE OTHER RETINOPATHY: ABNORMAL
RIGHT EYE DIABETIC RETINOPATHY: ABNORMAL
RIGHT EYE IMAGE QUALITY: ABNORMAL
RIGHT EYE MACULAR EDEMA: ABNORMAL
RIGHT EYE OTHER RETINOPATHY: ABNORMAL
SEVERITY (EYE EXAM): ABNORMAL
SL AMB POCT HEMOGLOBIN AIC: 7.7 (ref ?–6.5)

## 2025-05-19 PROCEDURE — 83036 HEMOGLOBIN GLYCOSYLATED A1C: CPT | Performed by: NURSE PRACTITIONER

## 2025-05-19 PROCEDURE — 99214 OFFICE O/P EST MOD 30 MIN: CPT | Performed by: INTERNAL MEDICINE

## 2025-05-19 PROCEDURE — 99214 OFFICE O/P EST MOD 30 MIN: CPT | Performed by: NURSE PRACTITIONER

## 2025-05-19 RX ORDER — SIMVASTATIN 20 MG
20 TABLET ORAL
Qty: 90 TABLET | Refills: 3 | Status: SHIPPED | OUTPATIENT
Start: 2025-05-19

## 2025-05-19 RX ORDER — SPIRONOLACTONE 50 MG/1
50 TABLET, FILM COATED ORAL 2 TIMES DAILY
Qty: 180 TABLET | Refills: 3 | Status: SHIPPED | OUTPATIENT
Start: 2025-05-19

## 2025-05-19 RX ORDER — LEVOTHYROXINE SODIUM 112 UG/1
112 TABLET ORAL DAILY
Qty: 90 TABLET | Refills: 3 | Status: SHIPPED | OUTPATIENT
Start: 2025-05-19

## 2025-05-19 NOTE — ASSESSMENT & PLAN NOTE
Orders:    spironolactone (ALDACTONE) 50 mg tablet; Take 1 tablet (50 mg total) by mouth 2 (two) times a day

## 2025-05-19 NOTE — PROGRESS NOTES
Name: Monika Flanagan      : 1960      MRN: [id number]  Encounter Provider: JAYSON Elizondo  Encounter Date: 2025   Encounter department: [facility] CARE [address]   :  Assessment & Plan  Type 2 diabetes mellitus without complication, without long-term current use of insulin (HCC)    Lab Results   Component Value Date    HGBA1C 7.7 (A) 2025     A1C slightly higher than previous 7.5% - managed by endocrine & has f/u scheduled today  Urine for microalbumin obtained in office  Foot exam updated  KRISTOPHER - IRIS done in office  Continue statin and ARB as rx'd    Orders:    POCT hemoglobin A1c    IRIS Diabetic eye exam    Albumin / creatinine urine ratio; Future    Primary hypertension  Stable/well controlled   Continue same losartan as rx'd  Return in 6 months       Pedal edema  Encouraged adequate water intake of at least 60-80 oz daily and limit salty foods       Encounter for screening mammogram for breast cancer    Orders:    Mammo screening bilateral w 3d and cad; Future    Screening for colon cancer    Orders:    Occult Blood, Fecal, IA; Future    Update mammo as ordered  Discussed colon screening options and she is willing to do FOBT  Update annual PE at next appt      Depression Screening and Follow-up Plan: Patient was screened for depression during today's encounter. They screened negative with a PHQ-9 score of 4.        History of Present Illness       States she was unable to get labs done as ordered with transportation difficulties.   States she is getting worse on procrastinating with her health issues. Hasn't scheduled mammo as previously ordered and overdue for colon screening.         Review of Systems   Constitutional: Negative.    Respiratory: Negative.     Cardiovascular: Negative.    Gastrointestinal: Negative.    Psychiatric/Behavioral:  Negative for dysphoric mood.        Objective   /76 (BP Location: Left arm, Patient Position: Sitting)   Pulse 88   Ht  "5' 4\" (1.626 m)   Wt 125 kg (276 lb 6.4 oz)   SpO2 95%   BMI 47.44 kg/m²          Physical Exam  Vitals reviewed.   Constitutional:       General: She is not in acute distress.     Appearance: Normal appearance. She is obese.   HENT:      Head: Normocephalic.     Eyes:      General: No scleral icterus.      Cardiovascular:      Rate and Rhythm: Normal rate and regular rhythm.      Pulses: no weak pulses.           Dorsalis pedis pulses are 2+ on the right side and 2+ on the left side.        Posterior tibial pulses are 2+ on the right side and 2+ on the left side.   Pulmonary:      Effort: Pulmonary effort is normal. No respiratory distress.      Breath sounds: Normal breath sounds.     Musculoskeletal:      Right lower leg: Edema (+1 pitting pedal) present.      Left lower leg: Edema (+1 pitting pedal) present.   Feet:      Right foot:      Skin integrity: Dry skin present. No ulcer, skin breakdown, erythema, warmth or callus.      Left foot:      Skin integrity: Callus (heel) and dry skin present. No ulcer, skin breakdown, erythema or warmth.     Skin:     General: Skin is warm and dry.     Neurological:      General: No focal deficit present.      Mental Status: She is alert and oriented to person, place, and time.      Motor: No weakness.      Gait: Gait normal.     Psychiatric:         Mood and Affect: Mood normal.         Behavior: Behavior normal.         Thought Content: Thought content normal.         Judgment: Judgment normal.         Patient's shoes and socks removed.    Right Foot/Ankle   Right Foot Inspection  Skin Exam: skin normal, skin intact and dry skin. No warmth, no callus, no erythema, no maceration, no abnormal color, no pre-ulcer, no ulcer and no callus.     Toe Exam: right toe deformity (ecchymotic right great toe nail).     Sensory   Vibration: intact  Proprioception: intact  Monofilament testing: intact    Vascular  The right DP pulse is 2+. The right PT pulse is 2+.     Right Toe  - " Comprehensive Exam  Ecchymosis: none  Arch: normal  Hammertoes: absent  Claw Toes: absent  Swelling: none   Tenderness: none       Left Foot/Ankle  Left Foot Inspection  Skin Exam: skin normal, skin intact, dry skin and callus (heel). No warmth, no erythema, no maceration, normal color, no pre-ulcer and no ulcer.     Toe Exam: ROM and strength within normal limits.     Sensory   Vibration: intact  Proprioception: intact  Monofilament testing: intact    Vascular  The left DP pulse is 2+. The left PT pulse is 2+.     Left Toe  - Comprehensive Exam  Ecchymosis: none  Arch: normal  Hammertoes: absent  Claw toes: absent  Swelling: none   Tenderness: none       Assign Risk Category  No deformity present  No loss of protective sensation  No weak pulses  Risk: 0         Administrative Statements   I have spent a total time of 30 minutes in caring for this patient on the day of the visit/encounter including Diagnostic results, Risks and benefits of tx options, Instructions for management, Patient and family education, Importance of tx compliance, Risk factor reductions, Impressions, Counseling / Coordination of care, Documenting in the medical record, Reviewing/placing orders in the medical record (including tests, medications, and/or procedures), and Obtaining or reviewing history  .

## 2025-05-19 NOTE — PATIENT INSTRUCTIONS
Hgba1c is 7.7%. it has climbed some.     Continue the same metformin 1000 mg twice a day.     Work on diet and activity and we may need to add a second diabetes medicine if not improved next visit.     For now, no change in the rest of the medicines.     Follow up in 6 months with blood work.        Area M Indication Text: Tumors in this location are included in Area M (cheek, forehead, scalp, neck, jawline and pretibial skin).  Mohs surgery is indicated for tumors in these anatomic locations.

## 2025-05-19 NOTE — ASSESSMENT & PLAN NOTE
Lab Results   Component Value Date    HGBA1C 7.7 (A) 05/19/2025     A1C slightly higher than previous 7.5% - managed by endocrine & has f/u scheduled today  Urine for microalbumin obtained in office  Foot exam updated  KRISTOPHER - IRIS done in office  Continue statin and ARB as rx'd    Orders:    POCT hemoglobin A1c    IRIS Diabetic eye exam    Albumin / creatinine urine ratio; Future

## 2025-05-19 NOTE — ASSESSMENT & PLAN NOTE
Lab Results   Component Value Date    HGBA1C 7.7 (A) 05/19/2025       Orders:    Comprehensive metabolic panel; Future    CBC and differential; Future    Hemoglobin A1C; Future    T4, free; Future    TSH, 3rd generation; Future    metFORMIN (GLUCOPHAGE) 500 mg tablet; Take 2 tablets (1,000 mg total) by mouth 2 (two) times a day with meals

## 2025-05-19 NOTE — ASSESSMENT & PLAN NOTE
Lab Results   Component Value Date    HGBA1C 7.7 (A) 05/19/2025       Orders:    Comprehensive metabolic panel; Future    CBC and differential; Future    Hemoglobin A1C; Future    T4, free; Future    TSH, 3rd generation; Future

## 2025-05-19 NOTE — PROGRESS NOTES
Name: Monika Flanagan      : 1960      MRN: [id number]  Encounter Provider: Monika Vazquez MD  Encounter Date: 2025   Encounter department: [facility] [place]    No chief complaint on file.  :  Assessment & Plan  Type 2 diabetes mellitus without complication, without long-term current use of insulin (HCC)    Lab Results   Component Value Date    HGBA1C 7.7 (A) 2025       Orders:    Comprehensive metabolic panel; Future    CBC and differential; Future    Hemoglobin A1C; Future    T4, free; Future    TSH, 3rd generation; Future    metFORMIN (GLUCOPHAGE) 500 mg tablet; Take 2 tablets (1,000 mg total) by mouth 2 (two) times a day with meals    Diabetic polyneuropathy associated with type 2 diabetes mellitus (HCC)    Lab Results   Component Value Date    HGBA1C 7.7 (A) 2025       Orders:    Comprehensive metabolic panel; Future    CBC and differential; Future    Hemoglobin A1C; Future    T4, free; Future    TSH, 3rd generation; Future    Polycystic ovarian syndrome    Orders:    Comprehensive metabolic panel; Future    CBC and differential; Future    Hemoglobin A1C; Future    T4, free; Future    TSH, 3rd generation; Future    Hypothyroidism due to Hashimoto's thyroiditis    Orders:    Comprehensive metabolic panel; Future    CBC and differential; Future    Hemoglobin A1C; Future    T4, free; Future    TSH, 3rd generation; Future    Primary hypertension    Orders:    Comprehensive metabolic panel; Future    CBC and differential; Future    Hemoglobin A1C; Future    T4, free; Future    TSH, 3rd generation; Future    Mixed hyperlipidemia    Orders:    Comprehensive metabolic panel; Future    CBC and differential; Future    Hemoglobin A1C; Future    T4, free; Future    TSH, 3rd generation; Future    Hyperlipidemia, unspecified hyperlipidemia type    Orders:    simvastatin (ZOCOR) 20 mg tablet; Take 1 tablet (20 mg total) by mouth daily at  bedtime    Hirsutism    Orders:    spironolactone (ALDACTONE) 50 mg tablet; Take 1 tablet (50 mg total) by mouth 2 (two) times a day    PCOS (polycystic ovarian syndrome)    Orders:    spironolactone (ALDACTONE) 50 mg tablet; Take 1 tablet (50 mg total) by mouth 2 (two) times a day    Acquired hypothyroidism    Orders:    levothyroxine 112 mcg tablet; Take 1 tablet (112 mcg total) by mouth daily      Assessment & Plan  1. Type 2 diabetes mellitus: inadequately controlled.  - Hemoglobin A1c has increased to 7.7 from 7.5.  - Currently on metformin 1000 mg daily.  - Advised to adhere strictly to dietary regimen and increase physical activity.  - Consider addition of a second antidiabetic medication such as Januvia or Tradjenta if no improvement by next visit.    2. Hypertension.  - No chest pain or shortness of breath reported.  Normotensive in the office today.  - Currently taking losartan 100 mg daily.  - Advised to continue current medication regimen.  - Monitor blood pressure regularly.    3. Hyperlipidemia.  - Currently taking simvastatin 20 mg daily.  - Advised to continue current medication regimen.  - Lipid profile to be monitored at next visit.    4. Hypothyroidism due to Hashimoto's thyroiditis.  - Currently taking levothyroxine 112 mcg daily.  - Advised to continue current medication regimen.  - Thyroid function tests to be monitored at next visit.    5. Polycystic ovary syndrome (PCOS).  - Hair growth on chin managed with spironolactone 50 mg twice a day.  - Advised to continue current medication regimen.  - Monitor for any changes in symptoms.    6.  Diabetic neuropathy.  - No numbness or tingling in feet reported.  - Recent foot exam showed no wounds.  - Advised to maintain good hydration and monitor for any changes.  - Continue regular foot exams.    Follow-up: The patient will follow up in 6 months with hemoglobin A1c, CMP, CBC, TSH, and free T4.      History of Present Illness   History of Present  Illness  Monika Flanagan is a 64-year-old female with a history of type 2 diabetes with neuropathy, hypothyroidism due to Hashimoto's thyroiditis, PCOS, hypertension, and hyperlipidemia, presenting for a follow-up visit.    Diabetes complications include neuropathy.  She denies nephropathy, retinopathy, heart attack, stroke, or claudication.    She reports frequent urination, which has been a persistent issue, often accompanied by urgency. She experiences nocturia and recalls a period when she was on medication for this condition, which initially provided relief but later exacerbated the urgency, leading her to discontinue its use. She does not report excessive thirst or hunger. She is currently on metformin 1000 mg twice daily.  She has not had an ophthalmological examination in several years and reports no current visual disturbances. She underwent a foot examination today and reports no numbness, tingling, or wounds on her feet. She was unable to complete her fasting blood test due to transportation issues but did provide a urine sample and undergo a fingerstick test.    She has been experiencing swelling in her left ankle and foot for the past year, which she attributes to dehydration. She has been advised to increase her fluid intake to 60 to 80 ounces daily and reduce her salt intake. She is currently on spironolactone 50 mg twice daily, losartan 100 mg daily, and simvastatin 20 mg daily. She reports no chest pain or shortness of breath.    She continues to take levothyroxine 112 mcg daily for her thyroid condition.    She reports no significant changes in the hair growth on her chin.    Monika Flanagan is a 64 y.o. female with type 2 diabetes with neuropathy, hypothyroidism due to Hashimoto's thyroiditis, polycystic ovary syndrome, hyperlipidemia, hypertension seen in follow up. Reports complications of neuropathy. Denies recent severe hypoglycemic or severe hyperglycemic episodes. Denies any issues with her current  "regimen. Last A1C was 7.7%. Denies recent illness, hospitalization or steroid use.     Home blood glucometer readings:   Does not check blood sugars.     Current regimen:   Metformin 1000 mg twice a day for diabetes management.    Levothyroxine 112 mcg daily for hypothyroidism.    Losartan 100 mg daily and spironolactone 50 mg twice daily for hypertension.    Simvastatin 20 mg daily for hyperlipidemia.    Spironolactone also used for PCOS and hirsutism.      Last Eye Exam: 06/16/2022  Last Foot Exam: 05/19/2025  Health Maintenance   Topic Date Due    Diabetic Eye Exam  06/16/2024    Diabetic Foot Exam  05/19/2026     Pertinent Medical History   Monika Flanagan is a 64-year-old female with a history of type 2 diabetes with neuropathy, hypothyroidism due to Hashimoto's thyroiditis, PCOS, hypertension, and hyperlipidemia, presenting for a follow-up visit.    Diabetes complications include neuropathy.  She denies nephropathy, retinopathy, heart attack, stroke, or claudication.        Review of Systems as per HPI    Medical History Reviewed by provider this encounter:  Tobacco  Allergies  Meds  Problems  Med Hx  Surg Hx  Fam Hx     .  Medications Ordered Prior to Encounter[1]   Social History[2]     Medical History Reviewed by provider this encounter:  Tobacco  Allergies  Meds  Problems  Med Hx  Surg Hx  Fam Hx     .    Objective   /78   Pulse 92   Ht 5' 4\" (1.626 m)   Wt 125 kg (276 lb)   SpO2 95%   BMI 47.38 kg/m²      Body mass index is 47.38 kg/m².  Wt Readings from Last 3 Encounters:   05/19/25 125 kg (276 lb)   05/19/25 125 kg (276 lb 6.4 oz)   11/22/24 127 kg (279 lb 9.6 oz)     Physical Exam  Cardiovascular: Heart auscultation revealed no abnormalities.  Respiratory: Lung auscultation revealed no abnormalities.  Physical Exam  Vitals and nursing note reviewed.   Constitutional:       General: She is not in acute distress.     Appearance: Normal appearance. She is well-developed.   HENT:      " Head: Normocephalic and atraumatic.     Eyes:      Conjunctiva/sclera: Conjunctivae normal.     Neck:      Vascular: No carotid bruit.      Comments: Thyroid normal in size.  No palpable thyroid nodules.  Cardiovascular:      Rate and Rhythm: Normal rate and regular rhythm.      Heart sounds: Normal heart sounds. No murmur heard.  Pulmonary:      Effort: Pulmonary effort is normal. No respiratory distress.      Breath sounds: Normal breath sounds. No wheezing.   Abdominal:      Palpations: Abdomen is soft.     Musculoskeletal:         General: No swelling.      Cervical back: Neck supple.      Right lower leg: No edema.      Left lower leg: No edema.   Lymphadenopathy:      Cervical: No cervical adenopathy.     Skin:     General: Skin is warm and dry.      Capillary Refill: Capillary refill takes less than 2 seconds.     Neurological:      Mental Status: She is alert and oriented to person, place, and time.     Psychiatric:         Mood and Affect: Mood normal.       Results    Labs:   Lab Results   Component Value Date    HGBA1C 7.7 (A) 05/19/2025    HGBA1C 7.5 (H) 11/11/2024    HGBA1C 7.1 (H) 05/08/2024     Lab Results   Component Value Date    CREATININE 0.73 11/11/2024    CREATININE 0.64 05/08/2024    CREATININE 0.65 11/08/2023    BUN 15 11/11/2024     08/24/2017    K 4.9 11/11/2024    CL 96 11/11/2024    CO2 26 11/11/2024     eGFR   Date Value Ref Range Status   11/11/2024 93 >59 mL/min/1.73 Final     Lab Results   Component Value Date    CHOL 166 08/24/2017    HDL 39 (L) 11/11/2024    TRIG 169 (H) 11/11/2024    CHOLHDL 3.5 05/09/2023     Lab Results   Component Value Date    ALT 24 11/11/2024    AST 33 11/11/2024    ALKPHOS 58 08/24/2017    BILITOT 0.2 08/24/2017         Patient Instructions   Hgba1c is 7.7%. it has climbed some.     Continue the same metformin 1000 mg twice a day.     Work on diet and activity and we may need to add a second diabetes medicine if not improved next visit.     For now, no  change in the rest of the medicines.     Follow up in 6 months with blood work.         Discussed with the patient and all questioned fully answered. She will call me if any problems arise.           [1]   Current Outpatient Medications on File Prior to Visit   Medication Sig Dispense Refill    cholecalciferol (VITAMIN D3) 1,000 units tablet Take by mouth in the morning.      FLUoxetine (PROzac) 40 MG capsule take 1 capsule by mouth once daily 90 capsule 1    hydrocortisone 2.5 % cream Apply topically 2 (two) times a day (Patient taking differently: Apply topically in the morning and in the evening. As needed.) 28 g 2    losartan (COZAAR) 100 MG tablet take 1 tablet by mouth once daily 90 tablet 1    MULTIPLE VITAMIN PO Take by mouth in the morning.      Naproxen Sodium (ALEVE PO) Take by mouth      QUEtiapine (SEROquel) 50 mg tablet take 1 tablet by mouth at bedtime 90 tablet 1    betamethasone dipropionate (DIPROSONE) 0.05 % cream apply topically to affected area twice a day (Patient not taking: No sig reported) 45 g 0     No current facility-administered medications on file prior to visit.   [2]   Social History  Tobacco Use    Smoking status: Never    Smokeless tobacco: Never   Vaping Use    Vaping status: Never Used   Substance and Sexual Activity    Alcohol use: No    Drug use: No    Sexual activity: Not Currently

## 2025-05-19 NOTE — ASSESSMENT & PLAN NOTE
Orders:    Comprehensive metabolic panel; Future    CBC and differential; Future    Hemoglobin A1C; Future    T4, free; Future    TSH, 3rd generation; Future

## 2025-05-21 DIAGNOSIS — F33.42 RECURRENT MAJOR DEPRESSIVE DISORDER, IN FULL REMISSION (HCC): ICD-10-CM

## 2025-05-22 LAB
ALBUMIN/CREAT UR: 5 MG/G CREAT (ref 0–29)
CREAT UR-MCNC: 148.8 MG/DL
MICROALBUMIN UR-MCNC: 7.3 UG/ML

## 2025-05-22 RX ORDER — QUETIAPINE FUMARATE 50 MG/1
50 TABLET, FILM COATED ORAL
Qty: 90 TABLET | Refills: 3 | Status: SHIPPED | OUTPATIENT
Start: 2025-05-22

## 2025-05-23 ENCOUNTER — RESULTS FOLLOW-UP (OUTPATIENT)
Dept: FAMILY MEDICINE CLINIC | Facility: HOSPITAL | Age: 65
End: 2025-05-23

## 2025-05-31 DIAGNOSIS — I10 ESSENTIAL HYPERTENSION: ICD-10-CM

## 2025-06-01 RX ORDER — LOSARTAN POTASSIUM 100 MG/1
100 TABLET ORAL DAILY
Qty: 90 TABLET | Refills: 1 | Status: SHIPPED | OUTPATIENT
Start: 2025-06-01